# Patient Record
Sex: FEMALE | Race: WHITE | NOT HISPANIC OR LATINO
[De-identification: names, ages, dates, MRNs, and addresses within clinical notes are randomized per-mention and may not be internally consistent; named-entity substitution may affect disease eponyms.]

---

## 2017-02-07 ENCOUNTER — TRANSCRIPTION ENCOUNTER (OUTPATIENT)
Age: 47
End: 2017-02-07

## 2017-06-30 ENCOUNTER — TRANSCRIPTION ENCOUNTER (OUTPATIENT)
Age: 47
End: 2017-06-30

## 2017-09-01 ENCOUNTER — TRANSCRIPTION ENCOUNTER (OUTPATIENT)
Age: 47
End: 2017-09-01

## 2018-01-22 ENCOUNTER — TRANSCRIPTION ENCOUNTER (OUTPATIENT)
Age: 48
End: 2018-01-22

## 2018-02-19 ENCOUNTER — TRANSCRIPTION ENCOUNTER (OUTPATIENT)
Age: 48
End: 2018-02-19

## 2018-03-01 ENCOUNTER — TRANSCRIPTION ENCOUNTER (OUTPATIENT)
Age: 48
End: 2018-03-01

## 2018-07-10 ENCOUNTER — EMERGENCY (EMERGENCY)
Facility: HOSPITAL | Age: 48
LOS: 1 days | Discharge: HOME | End: 2018-07-10
Attending: EMERGENCY MEDICINE
Payer: COMMERCIAL

## 2018-07-10 VITALS
RESPIRATION RATE: 16 BRPM | TEMPERATURE: 99 F | SYSTOLIC BLOOD PRESSURE: 178 MMHG | HEART RATE: 88 BPM | DIASTOLIC BLOOD PRESSURE: 97 MMHG | OXYGEN SATURATION: 97 % | HEIGHT: 63 IN | WEIGHT: 240.08 LBS

## 2018-07-10 VITALS
OXYGEN SATURATION: 99 % | SYSTOLIC BLOOD PRESSURE: 137 MMHG | RESPIRATION RATE: 17 BRPM | HEART RATE: 75 BPM | TEMPERATURE: 98 F | DIASTOLIC BLOOD PRESSURE: 78 MMHG

## 2018-07-10 LAB
ALBUMIN SERPL ELPH-MCNC: 3.2 G/DL — LOW (ref 3.3–5)
ALP SERPL-CCNC: 191 U/L — HIGH (ref 40–120)
ALT FLD-CCNC: 38 U/L — SIGNIFICANT CHANGE UP (ref 12–78)
ANION GAP SERPL CALC-SCNC: 11 MMOL/L — SIGNIFICANT CHANGE UP (ref 5–17)
APPEARANCE UR: ABNORMAL
AST SERPL-CCNC: 43 U/L — HIGH (ref 15–37)
BASOPHILS # BLD AUTO: 0.03 K/UL — SIGNIFICANT CHANGE UP (ref 0–0.2)
BASOPHILS NFR BLD AUTO: 0.4 % — SIGNIFICANT CHANGE UP (ref 0–2)
BILIRUB SERPL-MCNC: 0.5 MG/DL — SIGNIFICANT CHANGE UP (ref 0.2–1.2)
BILIRUB UR-MCNC: ABNORMAL
BUN SERPL-MCNC: 7 MG/DL — SIGNIFICANT CHANGE UP (ref 7–23)
CALCIUM SERPL-MCNC: 8.7 MG/DL — SIGNIFICANT CHANGE UP (ref 8.5–10.1)
CHLORIDE SERPL-SCNC: 101 MMOL/L — SIGNIFICANT CHANGE UP (ref 96–108)
CO2 SERPL-SCNC: 25 MMOL/L — SIGNIFICANT CHANGE UP (ref 22–31)
COLOR SPEC: ABNORMAL
CREAT SERPL-MCNC: 0.69 MG/DL — SIGNIFICANT CHANGE UP (ref 0.5–1.3)
DIFF PNL FLD: ABNORMAL
EOSINOPHIL # BLD AUTO: 0.06 K/UL — SIGNIFICANT CHANGE UP (ref 0–0.5)
EOSINOPHIL NFR BLD AUTO: 0.9 % — SIGNIFICANT CHANGE UP (ref 0–6)
GLUCOSE SERPL-MCNC: 88 MG/DL — SIGNIFICANT CHANGE UP (ref 70–99)
GLUCOSE UR QL: NEGATIVE — SIGNIFICANT CHANGE UP
HCG SERPL-ACNC: <1 MIU/ML — SIGNIFICANT CHANGE UP
HCT VFR BLD CALC: 37.8 % — SIGNIFICANT CHANGE UP (ref 34.5–45)
HGB BLD-MCNC: 12.3 G/DL — SIGNIFICANT CHANGE UP (ref 11.5–15.5)
IMM GRANULOCYTES NFR BLD AUTO: 0.3 % — SIGNIFICANT CHANGE UP (ref 0–1.5)
KETONES UR-MCNC: ABNORMAL
LACTATE SERPL-SCNC: 1.3 MMOL/L — SIGNIFICANT CHANGE UP (ref 0.7–2)
LEUKOCYTE ESTERASE UR-ACNC: ABNORMAL
LYMPHOCYTES # BLD AUTO: 1.19 K/UL — SIGNIFICANT CHANGE UP (ref 1–3.3)
LYMPHOCYTES # BLD AUTO: 17.7 % — SIGNIFICANT CHANGE UP (ref 13–44)
MCHC RBC-ENTMCNC: 26.8 PG — LOW (ref 27–34)
MCHC RBC-ENTMCNC: 32.5 GM/DL — SIGNIFICANT CHANGE UP (ref 32–36)
MCV RBC AUTO: 82.4 FL — SIGNIFICANT CHANGE UP (ref 80–100)
MONOCYTES # BLD AUTO: 0.72 K/UL — SIGNIFICANT CHANGE UP (ref 0–0.9)
MONOCYTES NFR BLD AUTO: 10.7 % — SIGNIFICANT CHANGE UP (ref 2–14)
NEUTROPHILS # BLD AUTO: 4.69 K/UL — SIGNIFICANT CHANGE UP (ref 1.8–7.4)
NEUTROPHILS NFR BLD AUTO: 70 % — SIGNIFICANT CHANGE UP (ref 43–77)
NITRITE UR-MCNC: NEGATIVE — SIGNIFICANT CHANGE UP
PH UR: 5 — SIGNIFICANT CHANGE UP (ref 5–8)
PLATELET # BLD AUTO: 286 K/UL — SIGNIFICANT CHANGE UP (ref 150–400)
POTASSIUM SERPL-MCNC: 3.4 MMOL/L — LOW (ref 3.5–5.3)
POTASSIUM SERPL-SCNC: 3.4 MMOL/L — LOW (ref 3.5–5.3)
PROT SERPL-MCNC: 8 G/DL — SIGNIFICANT CHANGE UP (ref 6–8.3)
PROT UR-MCNC: 25 MG/DL
RAPID RVP RESULT: SIGNIFICANT CHANGE UP
RBC # BLD: 4.59 M/UL — SIGNIFICANT CHANGE UP (ref 3.8–5.2)
RBC # FLD: 12.7 % — SIGNIFICANT CHANGE UP (ref 10.3–14.5)
SODIUM SERPL-SCNC: 137 MMOL/L — SIGNIFICANT CHANGE UP (ref 135–145)
SP GR SPEC: 1.02 — SIGNIFICANT CHANGE UP (ref 1.01–1.02)
UROBILINOGEN FLD QL: 8
WBC # BLD: 6.71 K/UL — SIGNIFICANT CHANGE UP (ref 3.8–10.5)
WBC # FLD AUTO: 6.71 K/UL — SIGNIFICANT CHANGE UP (ref 3.8–10.5)

## 2018-07-10 PROCEDURE — 71045 X-RAY EXAM CHEST 1 VIEW: CPT

## 2018-07-10 PROCEDURE — 99284 EMERGENCY DEPT VISIT MOD MDM: CPT | Mod: 25

## 2018-07-10 PROCEDURE — 80053 COMPREHEN METABOLIC PANEL: CPT

## 2018-07-10 PROCEDURE — 93005 ELECTROCARDIOGRAM TRACING: CPT

## 2018-07-10 PROCEDURE — 86658 ENTEROVIRUS ANTIBODY: CPT

## 2018-07-10 PROCEDURE — 81001 URINALYSIS AUTO W/SCOPE: CPT

## 2018-07-10 PROCEDURE — 87486 CHLMYD PNEUM DNA AMP PROBE: CPT

## 2018-07-10 PROCEDURE — 74177 CT ABD & PELVIS W/CONTRAST: CPT | Mod: 26

## 2018-07-10 PROCEDURE — 87040 BLOOD CULTURE FOR BACTERIA: CPT

## 2018-07-10 PROCEDURE — 83605 ASSAY OF LACTIC ACID: CPT

## 2018-07-10 PROCEDURE — 87798 DETECT AGENT NOS DNA AMP: CPT

## 2018-07-10 PROCEDURE — 84702 CHORIONIC GONADOTROPIN TEST: CPT

## 2018-07-10 PROCEDURE — 87633 RESP VIRUS 12-25 TARGETS: CPT

## 2018-07-10 PROCEDURE — 74177 CT ABD & PELVIS W/CONTRAST: CPT

## 2018-07-10 PROCEDURE — 71045 X-RAY EXAM CHEST 1 VIEW: CPT | Mod: 26

## 2018-07-10 PROCEDURE — 85027 COMPLETE CBC AUTOMATED: CPT

## 2018-07-10 PROCEDURE — 99285 EMERGENCY DEPT VISIT HI MDM: CPT

## 2018-07-10 PROCEDURE — 93010 ELECTROCARDIOGRAM REPORT: CPT

## 2018-07-10 PROCEDURE — 96374 THER/PROPH/DIAG INJ IV PUSH: CPT | Mod: XU

## 2018-07-10 PROCEDURE — 87581 M.PNEUMON DNA AMP PROBE: CPT

## 2018-07-10 PROCEDURE — 87086 URINE CULTURE/COLONY COUNT: CPT

## 2018-07-10 PROCEDURE — 87150 DNA/RNA AMPLIFIED PROBE: CPT

## 2018-07-10 RX ORDER — HYDROXYCHLOROQUINE SULFATE 200 MG
1 TABLET ORAL
Qty: 0 | Refills: 0 | COMMUNITY

## 2018-07-10 RX ORDER — SODIUM CHLORIDE 9 MG/ML
1000 INJECTION INTRAMUSCULAR; INTRAVENOUS; SUBCUTANEOUS ONCE
Qty: 0 | Refills: 0 | Status: COMPLETED | OUTPATIENT
Start: 2018-07-10 | End: 2018-07-10

## 2018-07-10 RX ORDER — CEFUROXIME AXETIL 250 MG
1 TABLET ORAL
Qty: 14 | Refills: 0 | OUTPATIENT
Start: 2018-07-10 | End: 2018-07-16

## 2018-07-10 RX ORDER — CEFTRIAXONE 500 MG/1
1 INJECTION, POWDER, FOR SOLUTION INTRAMUSCULAR; INTRAVENOUS ONCE
Qty: 0 | Refills: 0 | Status: COMPLETED | OUTPATIENT
Start: 2018-07-10 | End: 2018-07-10

## 2018-07-10 RX ADMIN — SODIUM CHLORIDE 1000 MILLILITER(S): 9 INJECTION INTRAMUSCULAR; INTRAVENOUS; SUBCUTANEOUS at 12:43

## 2018-07-10 RX ADMIN — CEFTRIAXONE 100 GRAM(S): 500 INJECTION, POWDER, FOR SOLUTION INTRAMUSCULAR; INTRAVENOUS at 14:57

## 2018-07-10 NOTE — ED ADULT NURSE NOTE - OBJECTIVE STATEMENT
This is a 48 y/o Female received ambulatory c/o fever 101.7 fever started Thursday.  was seen by urgent care Sunday started Keflex, for UTI  pt reports sores in her mouth  rapid strep (-). Patient denies any nausea, vomiting. respiration even unlabored lung field clear bilaterally will monitor support and safety provided.

## 2018-07-10 NOTE — ED ADULT TRIAGE NOTE - CHIEF COMPLAINT QUOTE
reported tmax 101.7 fever started Thursday.  was seen by urgent care Sunday started Keflex, for UTI  pt reports sores in her mouth  rapid strep (-)

## 2018-07-10 NOTE — ED PROVIDER NOTE - PROGRESS NOTE DETAILS
labs imaging explained, explained this could be HSV or coxsackie, will f/u with PMD, told to return if symptoms worsen

## 2018-07-10 NOTE — ED PROVIDER NOTE - OBJECTIVE STATEMENT
46 yo female hx of lupus c/o intermittent fever since thursday, tmax 101.7, initially self treated for UTI symptoms with macrobid, then went to urgent care started on keflex.  Had rapid strep negative there.  Noticed oral lesion/ulcers 2 days ago.  Was exposed to children that had coxsackie.  No urinary complaints now.  PMD Dr. Bliss.

## 2018-07-10 NOTE — ED PROVIDER NOTE - ENMT, MLM
Airway patent, Nasal mucosa clear. +apthous like ulcer on right upper lip and roof of mouth and lower inner lip. Throat has no vesicles, no oropharyngeal exudates and uvula is midline.

## 2018-07-10 NOTE — ED PROVIDER NOTE - CARE PLAN
Principal Discharge DX:	Viral syndrome Principal Discharge DX:	Viral syndrome  Secondary Diagnosis:	UTI (urinary tract infection)

## 2018-07-11 LAB
-  COAGULASE NEGATIVE STAPHYLOCOCCUS: SIGNIFICANT CHANGE UP
CULTURE RESULTS: NO GROWTH — SIGNIFICANT CHANGE UP
GRAM STN FLD: SIGNIFICANT CHANGE UP
METHOD TYPE: SIGNIFICANT CHANGE UP
SPECIMEN SOURCE: SIGNIFICANT CHANGE UP
SPECIMEN SOURCE: SIGNIFICANT CHANGE UP

## 2018-07-12 LAB — GRAM STN FLD: SIGNIFICANT CHANGE UP

## 2018-07-13 LAB
CULTURE RESULTS: SIGNIFICANT CHANGE UP
CV B1 AB TITR FLD: HIGH
CV B2 AB TITR FLD: HIGH
CV B3 AB TITR FLD: HIGH
CV B4 AB TITR FLD: HIGH
CV B5 AB TITR FLD: HIGH
CV B6 AB TITR FLD: HIGH
ORGANISM # SPEC MICROSCOPIC CNT: SIGNIFICANT CHANGE UP
ORGANISM # SPEC MICROSCOPIC CNT: SIGNIFICANT CHANGE UP
SPECIMEN SOURCE: SIGNIFICANT CHANGE UP

## 2018-07-15 LAB
CULTURE RESULTS: SIGNIFICANT CHANGE UP
SPECIMEN SOURCE: SIGNIFICANT CHANGE UP

## 2018-07-17 ENCOUNTER — TRANSCRIPTION ENCOUNTER (OUTPATIENT)
Age: 48
End: 2018-07-17

## 2018-10-09 ENCOUNTER — TRANSCRIPTION ENCOUNTER (OUTPATIENT)
Age: 48
End: 2018-10-09

## 2018-11-10 ENCOUNTER — TRANSCRIPTION ENCOUNTER (OUTPATIENT)
Age: 48
End: 2018-11-10

## 2019-08-09 ENCOUNTER — TRANSCRIPTION ENCOUNTER (OUTPATIENT)
Age: 49
End: 2019-08-09

## 2019-10-28 ENCOUNTER — TRANSCRIPTION ENCOUNTER (OUTPATIENT)
Age: 49
End: 2019-10-28

## 2019-11-15 ENCOUNTER — TRANSCRIPTION ENCOUNTER (OUTPATIENT)
Age: 49
End: 2019-11-15

## 2020-01-29 ENCOUNTER — TRANSCRIPTION ENCOUNTER (OUTPATIENT)
Age: 50
End: 2020-01-29

## 2020-03-04 PROBLEM — L93.0 DISCOID LUPUS ERYTHEMATOSUS: Chronic | Status: ACTIVE | Noted: 2018-07-10

## 2020-03-05 ENCOUNTER — NON-APPOINTMENT (OUTPATIENT)
Age: 50
End: 2020-03-05

## 2020-03-05 ENCOUNTER — APPOINTMENT (OUTPATIENT)
Dept: CARDIOLOGY | Facility: CLINIC | Age: 50
End: 2020-03-05
Payer: COMMERCIAL

## 2020-03-05 VITALS
HEART RATE: 75 BPM | SYSTOLIC BLOOD PRESSURE: 143 MMHG | BODY MASS INDEX: 45.45 KG/M2 | OXYGEN SATURATION: 96 % | HEIGHT: 62 IN | DIASTOLIC BLOOD PRESSURE: 84 MMHG | WEIGHT: 247 LBS

## 2020-03-05 VITALS — SYSTOLIC BLOOD PRESSURE: 128 MMHG | DIASTOLIC BLOOD PRESSURE: 85 MMHG

## 2020-03-05 DIAGNOSIS — M32.9 SYSTEMIC LUPUS ERYTHEMATOSUS, UNSPECIFIED: ICD-10-CM

## 2020-03-05 DIAGNOSIS — Z82.49 FAMILY HISTORY OF ISCHEMIC HEART DISEASE AND OTHER DISEASES OF THE CIRCULATORY SYSTEM: ICD-10-CM

## 2020-03-05 DIAGNOSIS — Z87.42 PERSONAL HISTORY OF OTHER DISEASES OF THE FEMALE GENITAL TRACT: ICD-10-CM

## 2020-03-05 PROCEDURE — 93000 ELECTROCARDIOGRAM COMPLETE: CPT

## 2020-03-05 PROCEDURE — 99244 OFF/OP CNSLTJ NEW/EST MOD 40: CPT

## 2020-03-05 NOTE — HISTORY OF PRESENT ILLNESS
[FreeTextEntry1] : Marcelle is a 49-year-old here for evaluation. She has a history of lupus diagnosed about 4 years ago. She reports a possible history of pericarditis in the past.\par Recently, her ESR has been more elevated in the 80 range. She has been on Plaquenil for years. There is a plan to possibly start her on immunotherapy.\par \par She reports an episode of chest pain, at night, associated with some arm pain. She also reports dyspnea on exertion, and generally not feeling well. She has a very strong family history of coronary artery disease. All of the males in her family had CAD. Her father had bypass surgery in his 20s, and passed away before 45.\par She denies toxic habits. She reports weight gain overall and has not been exercising.

## 2020-03-05 NOTE — DISCUSSION/SUMMARY
[FreeTextEntry1] : Marcelle is a 49-year-old female here for evaluation. She has a history of lupus, and a significant family history of premature coronary artery disease. Her EKG today is unremarkable. In the setting of her symptoms, she will have a 2-D echocardiogram to evaluate for structural heart disease and/or a pericardial effusion. She will also have an exercise stress test to evaluate for ischemia. Depending on results of this test, we will proceed with additional testing for risk stratification, given her strong family history of premature disease. I stressed importance of diet, exercise, weight loss, to reduce her overall cardiovascular risk. We will speak after the tests and arrange followup.

## 2020-03-05 NOTE — REVIEW OF SYSTEMS
[Feeling Fatigued] : feeling fatigued [see HPI] : see HPI [Shortness Of Breath] : shortness of breath [Dyspnea on exertion] : dyspnea during exertion [Chest Pain] : chest pain [Skin: A Rash] : rash: [Negative] : Heme/Lymph

## 2020-03-05 NOTE — PHYSICAL EXAM
[General Appearance - Well Developed] : well developed [Normal Appearance] : normal appearance [Well Groomed] : well groomed [General Appearance - Well Nourished] : well nourished [No Deformities] : no deformities [General Appearance - In No Acute Distress] : no acute distress [Normal Conjunctiva] : the conjunctiva exhibited no abnormalities [Eyelids - No Xanthelasma] : the eyelids demonstrated no xanthelasmas [Normal Oral Mucosa] : normal oral mucosa [No Oral Pallor] : no oral pallor [No Oral Cyanosis] : no oral cyanosis [Normal Jugular Venous A Waves Present] : normal jugular venous A waves present [Normal Jugular Venous V Waves Present] : normal jugular venous V waves present [No Jugular Venous Ball A Waves] : no jugular venous ball A waves [Normal Rate] : normal [Normal S1] : normal S1 [Normal S2] : normal S2 [S3] : no S3 [S4] : no S4 [No Murmur] : no murmurs heard [Right Carotid Bruit] : no bruit heard over the right carotid [Left Carotid Bruit] : no bruit heard over the left carotid [Right Femoral Bruit] : no bruit heard over the right femoral artery [Left Femoral Bruit] : no bruit heard over the left femoral artery [2+] : left 2+ [No Abnormalities] : the abdominal aorta was not enlarged and no bruit was heard [No Pitting Edema] : no pitting edema present [Respiration, Rhythm And Depth] : normal respiratory rhythm and effort [Exaggerated Use Of Accessory Muscles For Inspiration] : no accessory muscle use [Auscultation Breath Sounds / Voice Sounds] : lungs were clear to auscultation bilaterally [Abdomen Soft] : soft [Abdomen Tenderness] : non-tender [Abdomen Mass (___ Cm)] : no abdominal mass palpated [Abnormal Walk] : normal gait [Gait - Sufficient For Exercise Testing] : the gait was sufficient for exercise testing [Nail Clubbing] : no clubbing of the fingernails [Cyanosis, Localized] : no localized cyanosis [Petechial Hemorrhages (___cm)] : no petechial hemorrhages [Skin Color & Pigmentation] : normal skin color and pigmentation [] : no rash [No Venous Stasis] : no venous stasis [Skin Lesions] : no skin lesions [No Skin Ulcers] : no skin ulcer [No Xanthoma] : no  xanthoma was observed [Oriented To Time, Place, And Person] : oriented to person, place, and time [Affect] : the affect was normal [Mood] : the mood was normal [No Anxiety] : not feeling anxious

## 2020-03-12 ENCOUNTER — APPOINTMENT (OUTPATIENT)
Dept: PULMONOLOGY | Facility: CLINIC | Age: 50
End: 2020-03-12
Payer: COMMERCIAL

## 2020-03-12 PROCEDURE — 94729 DIFFUSING CAPACITY: CPT

## 2020-03-12 PROCEDURE — 94726 PLETHYSMOGRAPHY LUNG VOLUMES: CPT

## 2020-03-12 PROCEDURE — 94010 BREATHING CAPACITY TEST: CPT

## 2020-03-17 ENCOUNTER — APPOINTMENT (OUTPATIENT)
Dept: CARDIOLOGY | Facility: CLINIC | Age: 50
End: 2020-03-17
Payer: COMMERCIAL

## 2020-03-17 PROCEDURE — 93306 TTE W/DOPPLER COMPLETE: CPT

## 2020-03-17 PROCEDURE — 93015 CV STRESS TEST SUPVJ I&R: CPT

## 2020-05-13 ENCOUNTER — TRANSCRIPTION ENCOUNTER (OUTPATIENT)
Age: 50
End: 2020-05-13

## 2020-06-17 ENCOUNTER — LABORATORY RESULT (OUTPATIENT)
Age: 50
End: 2020-06-17

## 2020-06-18 LAB
ALBUMIN SERPL ELPH-MCNC: 4.2 G/DL
ALP BLD-CCNC: 133 U/L
ALT SERPL-CCNC: 19 U/L
ANION GAP SERPL CALC-SCNC: 14 MMOL/L
APPEARANCE: CLEAR
AST SERPL-CCNC: 22 U/L
BASOPHILS # BLD AUTO: 0.08 K/UL
BASOPHILS NFR BLD AUTO: 1.1 %
BILIRUB SERPL-MCNC: 0.2 MG/DL
BILIRUBIN URINE: NEGATIVE
BLOOD URINE: NEGATIVE
BUN SERPL-MCNC: 12 MG/DL
C3 SERPL-MCNC: 170 MG/DL
C4 SERPL-MCNC: 24 MG/DL
CALCIUM SERPL-MCNC: 9 MG/DL
CHLORIDE SERPL-SCNC: 100 MMOL/L
CO2 SERPL-SCNC: 25 MMOL/L
COLOR: COLORLESS
CREAT SERPL-MCNC: 0.68 MG/DL
DSDNA AB SER-ACNC: <12 IU/ML
EOSINOPHIL # BLD AUTO: 0.45 K/UL
EOSINOPHIL NFR BLD AUTO: 6.3 %
ERYTHROCYTE [SEDIMENTATION RATE] IN BLOOD BY WESTERGREN METHOD: 62 MM/HR
GLUCOSE QUALITATIVE U: NEGATIVE
GLUCOSE SERPL-MCNC: 69 MG/DL
HCT VFR BLD CALC: 38.8 %
HGB BLD-MCNC: 11.5 G/DL
IMM GRANULOCYTES NFR BLD AUTO: 0.3 %
KETONES URINE: NEGATIVE
LEUKOCYTE ESTERASE URINE: NEGATIVE
LYMPHOCYTES # BLD AUTO: 2.36 K/UL
LYMPHOCYTES NFR BLD AUTO: 32.8 %
MAN DIFF?: NORMAL
MCHC RBC-ENTMCNC: 26.6 PG
MCHC RBC-ENTMCNC: 29.6 GM/DL
MCV RBC AUTO: 89.6 FL
MONOCYTES # BLD AUTO: 0.64 K/UL
MONOCYTES NFR BLD AUTO: 8.9 %
NEUTROPHILS # BLD AUTO: 3.64 K/UL
NEUTROPHILS NFR BLD AUTO: 50.6 %
NITRITE URINE: NEGATIVE
PH URINE: 6.5
PLATELET # BLD AUTO: 360 K/UL
POTASSIUM SERPL-SCNC: 4.4 MMOL/L
PROT SERPL-MCNC: 7.1 G/DL
PROTEIN URINE: NEGATIVE
RBC # BLD: 4.33 M/UL
RBC # FLD: 13.3 %
SODIUM SERPL-SCNC: 139 MMOL/L
SPECIFIC GRAVITY URINE: 1.01
UROBILINOGEN URINE: NORMAL
WBC # FLD AUTO: 7.19 K/UL

## 2020-07-02 DIAGNOSIS — Z82.49 FAMILY HISTORY OF ISCHEMIC HEART DISEASE AND OTHER DISEASES OF THE CIRCULATORY SYSTEM: ICD-10-CM

## 2020-07-14 ENCOUNTER — LABORATORY RESULT (OUTPATIENT)
Age: 50
End: 2020-07-14

## 2020-07-17 LAB
ALBUMIN SERPL ELPH-MCNC: 4.2 G/DL
ALP BLD-CCNC: 138 U/L
ALT SERPL-CCNC: 22 U/L
ANION GAP SERPL CALC-SCNC: 15 MMOL/L
APPEARANCE: CLEAR
AST SERPL-CCNC: 26 U/L
BASOPHILS # BLD AUTO: 0.09 K/UL
BASOPHILS NFR BLD AUTO: 1.3 %
BILIRUB SERPL-MCNC: 0.2 MG/DL
BILIRUBIN URINE: NEGATIVE
BLOOD URINE: NEGATIVE
BUN SERPL-MCNC: 15 MG/DL
C3 SERPL-MCNC: 149 MG/DL
C4 SERPL-MCNC: 26 MG/DL
CALCIUM SERPL-MCNC: 9 MG/DL
CHLORIDE SERPL-SCNC: 101 MMOL/L
CO2 SERPL-SCNC: 22 MMOL/L
COLOR: COLORLESS
CREAT SERPL-MCNC: 0.73 MG/DL
DSDNA AB SER-ACNC: <12 IU/ML
EOSINOPHIL # BLD AUTO: 0.31 K/UL
EOSINOPHIL NFR BLD AUTO: 4.6 %
ERYTHROCYTE [SEDIMENTATION RATE] IN BLOOD BY WESTERGREN METHOD: 97 MM/HR
GLUCOSE QUALITATIVE U: NEGATIVE
GLUCOSE SERPL-MCNC: 92 MG/DL
HCT VFR BLD CALC: 37.2 %
HGB BLD-MCNC: 11.5 G/DL
IMM GRANULOCYTES NFR BLD AUTO: 0.3 %
KETONES URINE: NEGATIVE
LEUKOCYTE ESTERASE URINE: NEGATIVE
LYMPHOCYTES # BLD AUTO: 2.09 K/UL
LYMPHOCYTES NFR BLD AUTO: 31 %
MAN DIFF?: NORMAL
MCHC RBC-ENTMCNC: 26.9 PG
MCHC RBC-ENTMCNC: 30.9 GM/DL
MCV RBC AUTO: 87.1 FL
MONOCYTES # BLD AUTO: 0.56 K/UL
MONOCYTES NFR BLD AUTO: 8.3 %
NEUTROPHILS # BLD AUTO: 3.68 K/UL
NEUTROPHILS NFR BLD AUTO: 54.5 %
NITRITE URINE: NEGATIVE
PH URINE: 6.5
PLATELET # BLD AUTO: 322 K/UL
POTASSIUM SERPL-SCNC: 4.1 MMOL/L
PROT SERPL-MCNC: 6.8 G/DL
PROTEIN URINE: NEGATIVE
RBC # BLD: 4.27 M/UL
RBC # FLD: 13.4 %
SODIUM SERPL-SCNC: 138 MMOL/L
SPECIFIC GRAVITY URINE: 1.01
UROBILINOGEN URINE: NORMAL
WBC # FLD AUTO: 6.75 K/UL

## 2020-09-21 ENCOUNTER — TRANSCRIPTION ENCOUNTER (OUTPATIENT)
Age: 50
End: 2020-09-21

## 2020-10-02 NOTE — ED PROVIDER NOTE - RESPIRATORY, MLM
Prophylactic measure
Breath sounds clear and equal bilaterally.

## 2021-02-04 ENCOUNTER — NON-APPOINTMENT (OUTPATIENT)
Age: 51
End: 2021-02-04

## 2021-02-08 ENCOUNTER — APPOINTMENT (OUTPATIENT)
Dept: RHEUMATOLOGY | Facility: CLINIC | Age: 51
End: 2021-02-08

## 2021-02-08 VITALS
WEIGHT: 248 LBS | RESPIRATION RATE: 16 BRPM | BODY MASS INDEX: 45.36 KG/M2 | TEMPERATURE: 97.7 F | SYSTOLIC BLOOD PRESSURE: 136 MMHG | OXYGEN SATURATION: 98 % | HEART RATE: 89 BPM | DIASTOLIC BLOOD PRESSURE: 80 MMHG

## 2021-02-08 RX ORDER — NITROFURANTOIN (MONOHYDRATE/MACROCRYSTALS) 25; 75 MG/1; MG/1
100 CAPSULE ORAL
Qty: 14 | Refills: 0 | Status: DISCONTINUED | COMMUNITY
Start: 2021-01-22

## 2021-02-08 RX ORDER — CIPROFLOXACIN HYDROCHLORIDE 250 MG/1
250 TABLET, FILM COATED ORAL
Qty: 6 | Refills: 0 | Status: DISCONTINUED | COMMUNITY
Start: 2020-09-21

## 2021-02-08 RX ORDER — AMOXICILLIN 500 MG/1
500 CAPSULE ORAL
Qty: 21 | Refills: 0 | Status: DISCONTINUED | COMMUNITY
Start: 2021-02-03

## 2021-02-08 RX ORDER — MYCOPHENOLATE MOFETIL 500 MG/1
TABLET, FILM COATED ORAL
Refills: 0 | Status: DISCONTINUED | COMMUNITY
End: 2021-02-01

## 2021-02-08 RX ORDER — COLCHICINE 0.6 MG/1
0.6 TABLET ORAL TWICE DAILY
Qty: 60 | Refills: 2 | Status: DISCONTINUED | COMMUNITY
Start: 1900-01-01 | End: 2021-02-08

## 2021-02-08 RX ORDER — CHLORHEXIDINE GLUCONATE, 0.12% ORAL RINSE 1.2 MG/ML
0.12 SOLUTION DENTAL
Qty: 473 | Refills: 0 | Status: DISCONTINUED | COMMUNITY
Start: 2021-02-03

## 2021-02-08 NOTE — HISTORY OF PRESENT ILLNESS
[FreeTextEntry1] : 50 year old female with SLE diagnosed 2016 with features that include malar rash, oral ulcers, arthritis, pericarditis, DNA+, alopecia, fever.  Of note, she is ROSE-.  Her serologies include - Sm/RNP/Ro/La/aPL; she has never been hypocomplementemic.  Her ESR has been persistently elevated. She has had a skin biopsy with interface dermatitis; her most pressing complaints have been of joint pain and stiffness, malar rash, fatigue.\par \par Febrary 8, 2021\par f/u SLE, on Methotrexate 12.5mg x4 weeks.  She continues on plaquenil and colchicine; MMF was tapered over 3 weeks since her last visit on January 12.  She notes marked improvement and has no pain.  Her previous stiffness has lessoned to ~1 hour (and there have been recent days when she had no stiffness).  She additionally feels markedly more energetic.  There is increased alopecia (?medication related), no rash, nausea, vomiting, abdominal pain, edema, ulcers, lymphadenopathy, fever, anorexia, weight loss, chest pain, shortness of breath, nausea, vomiting, edema, anosmia or ageusia.  She had a UTI the week of Jan 25--received antibiotics from pcp; also with tooth extraction 2/3 (and given amox for 5 days). She notes dryness of her skin (particularly of her hands)\par \par Meds: Plaquenil 400mg (last optho ~1yr ago), colchicine .6mg bid, methotrexate, 12.5mg q week (Sat), folic acid 1mg/week\par \par PE:  135/80  248lb, 7oz\par Skin: clear without rash; hair thin (but unchanged), no synovitis or joint tenderness. No oral/nasal ulcerations, lymphadenopathy, periungal erythema, edema.\par Lungs: Clear, Cor: RRR w/o m/r/g, Abd: soft, NT, BS+, Ext: no CCE, Neuro-gait normal\par \par Imp\par SLE with improvement of her musculoskeletal symptoms with initiation of methotrexate.  Increased hair loss likely related to the medication.  As she is not sure if her symptoms of stiffness have plateaued or are continuing to improve and will monitor over the next week (if plateau, will increase mtx).  Will check labs.  Will give lachydrin.\par \par COVID-Pt to continue to follow recommendations of social distancing, masks, and hand hygiene.  Have given her a letter stating that she is immunocompromised and should receive the vaccine (when available).\par

## 2021-02-08 NOTE — PHYSICAL EXAM
[General Appearance - Alert] : alert [General Appearance - Well Nourished] : well nourished [General Appearance - Well Developed] : well developed [Extraocular Movements] : extraocular movements were intact [Outer Ear] : the ears and nose were normal in appearance [Examination Of The Oral Cavity] : the lips and gums were normal [Nasal Cavity] : the nasal mucosa and septum were normal [Oropharynx] : the oropharynx was normal [Exaggerated Use Of Accessory Muscles For Inspiration] : no accessory muscle use [Auscultation Breath Sounds / Voice Sounds] : lungs were clear to auscultation bilaterally [Heart Sounds] : normal S1 and S2 [Heart Rate And Rhythm] : heart rate was normal and rhythm regular [Heart Sounds Gallop] : no gallops [Murmurs] : no murmurs [Heart Sounds Pericardial Friction Rub] : no pericardial rub [Bowel Sounds] : normal bowel sounds [Abdomen Soft] : soft [Abdomen Tenderness] : non-tender [Cervical Lymph Nodes Enlarged Anterior Bilaterally] : anterior cervical [Cervical Lymph Nodes Enlarged Posterior Bilaterally] : posterior cervical [Supraclavicular Lymph Nodes Enlarged Bilaterally] : supraclavicular [Abnormal Walk] : normal gait [Nail Clubbing] : no clubbing  or cyanosis of the fingernails [Musculoskeletal - Swelling] : no joint swelling seen [Motor Tone] : muscle strength and tone were normal [Skin Color & Pigmentation] : normal skin color and pigmentation [] : no rash [Skin Lesions] : no skin lesions [FreeTextEntry1] : see hpi [No Focal Deficits] : no focal deficits

## 2021-03-22 ENCOUNTER — APPOINTMENT (OUTPATIENT)
Dept: RHEUMATOLOGY | Facility: CLINIC | Age: 51
End: 2021-03-22

## 2021-03-22 VITALS
DIASTOLIC BLOOD PRESSURE: 81 MMHG | OXYGEN SATURATION: 97 % | RESPIRATION RATE: 16 BRPM | HEART RATE: 91 BPM | TEMPERATURE: 97.1 F | WEIGHT: 248.8 LBS | SYSTOLIC BLOOD PRESSURE: 131 MMHG | BODY MASS INDEX: 45.51 KG/M2

## 2021-03-22 NOTE — PHYSICAL EXAM
[General Appearance - Alert] : alert [General Appearance - Well Nourished] : well nourished [General Appearance - Well Developed] : well developed [Extraocular Movements] : extraocular movements were intact [Outer Ear] : the ears and nose were normal in appearance [Examination Of The Oral Cavity] : the lips and gums were normal [Nasal Cavity] : the nasal mucosa and septum were normal [Oropharynx] : the oropharynx was normal [Exaggerated Use Of Accessory Muscles For Inspiration] : no accessory muscle use [Auscultation Breath Sounds / Voice Sounds] : lungs were clear to auscultation bilaterally [Heart Rate And Rhythm] : heart rate was normal and rhythm regular [Heart Sounds] : normal S1 and S2 [Heart Sounds Gallop] : no gallops [Murmurs] : no murmurs [Heart Sounds Pericardial Friction Rub] : no pericardial rub [Bowel Sounds] : normal bowel sounds [Abdomen Soft] : soft [Abdomen Tenderness] : non-tender [Cervical Lymph Nodes Enlarged Posterior Bilaterally] : posterior cervical [Cervical Lymph Nodes Enlarged Anterior Bilaterally] : anterior cervical [Supraclavicular Lymph Nodes Enlarged Bilaterally] : supraclavicular [Abnormal Walk] : normal gait [Nail Clubbing] : no clubbing  or cyanosis of the fingernails [Motor Tone] : muscle strength and tone were normal [Skin Color & Pigmentation] : normal skin color and pigmentation [] : no rash [Skin Lesions] : no skin lesions [FreeTextEntry1] : see hpi [No Focal Deficits] : no focal deficits

## 2021-03-22 NOTE — HISTORY OF PRESENT ILLNESS
[FreeTextEntry1] : 50 year old female with SLE diagnosed 2016 with features that include malar rash, oral ulcers, arthritis, pericarditis, DNA+, alopecia, fever.  Of note, she is ROSE-.  Her serologies include - Sm/RNP/Ro/La/aPL; she has never been hypocomplementemic.  Her ESR has been persistently elevated. She has had a skin biopsy with interface dermatitis; her most pressing complaints have been of joint pain and stiffness, malar rash, fatigue.\par \par Febrary 8, 2021\par f/u SLE, on Methotrexate 12.5mg x4 weeks.  She continues on plaquenil and colchicine; MMF was tapered over 3 weeks since her last visit on January 12.  She notes marked improvement and has no pain.  Her previous stiffness has lessoned to ~1 hour (and there have been recent days when she had no stiffness).  She additionally feels markedly more energetic.  There is increased alopecia (?medication related), no rash, nausea, vomiting, abdominal pain, edema, ulcers, lymphadenopathy, fever, anorexia, weight loss, chest pain, shortness of breath, nausea, vomiting, edema, anosmia or ageusia.  She had a UTI the week of Jan 25--received antibiotics from pcp; also with tooth extraction 2/3 (and given amox for 5 days). She notes dryness of her skin (particularly of her hands).\par \par Mar 22, 2021\par f/u SLE; MTX held secondary to covid vaccines (last on 3/14--[employer]) for 1 week.  She notes increased stiffness and pain in her hands and left ankle (now with difficulty on treadmill).   Increased hair loss (likely related to the mtx) without change.  No fever, rash, anorexia, weight loss, lymphadenopathy, chills, oral ulcers, fatigue, dry eyes, chest pain, shortness of breath, cough, anosmia, ageusia, nausea, vomiting, diarrhea, abdominal pain. She had noted that her joint symptoms had not continued to improve and decision was made to increase methotrexate (however, not yet done).  ?lachydrin\par \par Meds: Plaquenil 400mg (last optho ~1yr ago), colchicine .6mg bid, methotrexate, 12.5mg q week (Sat), folic acid 1mg/week\par \par PE:  [id number]  248lb, 12.8oz\par Skin: clear without rash; hair thin (but unchanged), left ankle synovitis, tenderness of bilateral mcps and pips without swelling;. No oral/nasal ulcerations, lymphadenopathy, periungal erythema, edema.  Lungs: Clear, Cor: RRR w/o m/r/g, Abd: soft, NT, BS+, Ext: no CCE, Neuro-gait normal\par \par Imp\par SLE with increased musculoskeletal symptoms associated with holding of methotrexate.  To reinitiate medication today at higher dose (15mg) and will move next week by 1 day to weekend.  Will check labs.  i Will check labs.  \par \par COVID-Pt is s/p vaccine (will need post vaccine blood in May); she will continue to follow recommendations of social distancing, masks, and hand hygiene.  \par \par f/u 4 weeks\par

## 2021-04-05 LAB
ALBUMIN SERPL ELPH-MCNC: 4.2 G/DL
ALP BLD-CCNC: 164 U/L
ALT SERPL-CCNC: 19 U/L
ANION GAP SERPL CALC-SCNC: 17 MMOL/L
APPEARANCE: CLEAR
AST SERPL-CCNC: 23 U/L
BASOPHILS # BLD AUTO: 0.05 K/UL
BASOPHILS NFR BLD AUTO: 0.8 %
BILIRUB SERPL-MCNC: <0.2 MG/DL
BILIRUBIN URINE: NEGATIVE
BLOOD URINE: NEGATIVE
BUN SERPL-MCNC: 14 MG/DL
C3 SERPL-MCNC: 167 MG/DL
C4 SERPL-MCNC: 27 MG/DL
CALCIUM SERPL-MCNC: 9.2 MG/DL
CHLORIDE SERPL-SCNC: 100 MMOL/L
CO2 SERPL-SCNC: 23 MMOL/L
COLOR: NORMAL
CREAT SERPL-MCNC: 0.84 MG/DL
DSDNA AB SER-ACNC: <12 IU/ML
EOSINOPHIL # BLD AUTO: 0.3 K/UL
EOSINOPHIL NFR BLD AUTO: 4.5 %
ERYTHROCYTE [SEDIMENTATION RATE] IN BLOOD BY WESTERGREN METHOD: 60 MM/HR
GLUCOSE QUALITATIVE U: NEGATIVE
GLUCOSE SERPL-MCNC: 55 MG/DL
HCT VFR BLD CALC: 38.9 %
HGB BLD-MCNC: 11.9 G/DL
IMM GRANULOCYTES NFR BLD AUTO: 0.3 %
KETONES URINE: NEGATIVE
LEUKOCYTE ESTERASE URINE: NEGATIVE
LYMPHOCYTES # BLD AUTO: 2.26 K/UL
LYMPHOCYTES NFR BLD AUTO: 34.1 %
MAN DIFF?: NORMAL
MCHC RBC-ENTMCNC: 27.6 PG
MCHC RBC-ENTMCNC: 30.6 GM/DL
MCV RBC AUTO: 90.3 FL
MONOCYTES # BLD AUTO: 0.44 K/UL
MONOCYTES NFR BLD AUTO: 6.6 %
NEUTROPHILS # BLD AUTO: 3.55 K/UL
NEUTROPHILS NFR BLD AUTO: 53.7 %
NITRITE URINE: NEGATIVE
PH URINE: 5.5
PLATELET # BLD AUTO: 388 K/UL
POTASSIUM SERPL-SCNC: 4.2 MMOL/L
PROT SERPL-MCNC: 7.2 G/DL
PROTEIN URINE: NEGATIVE
RBC # BLD: 4.31 M/UL
RBC # FLD: 14.7 %
SODIUM SERPL-SCNC: 140 MMOL/L
SPECIFIC GRAVITY URINE: 1.01
UROBILINOGEN URINE: NORMAL
WBC # FLD AUTO: 6.62 K/UL

## 2021-04-19 ENCOUNTER — APPOINTMENT (OUTPATIENT)
Dept: RHEUMATOLOGY | Facility: CLINIC | Age: 51
End: 2021-04-19

## 2021-04-19 VITALS
DIASTOLIC BLOOD PRESSURE: 79 MMHG | HEART RATE: 85 BPM | BODY MASS INDEX: 44.85 KG/M2 | WEIGHT: 245.2 LBS | TEMPERATURE: 97.1 F | RESPIRATION RATE: 18 BRPM | OXYGEN SATURATION: 98 % | SYSTOLIC BLOOD PRESSURE: 116 MMHG

## 2021-04-19 NOTE — HISTORY OF PRESENT ILLNESS
[FreeTextEntry1] : 50 year old female with SLE diagnosed 2016 with features that include malar rash, oral ulcers, arthritis, pericarditis, DNA+, alopecia, fever.  Of note, she is ROSE-.  Her serologies include - Sm/RNP/Ro/La/aPL; she has never been hypocomplementemic.  Her ESR has been persistently elevated. She has had a skin biopsy with interface dermatitis; her most pressing complaints have been of joint pain and stiffness, malar rash, fatigue.\par \par Febrary 8, 2021  PtGA 0.4\par f/u SLE, on Methotrexate 12.5mg x4 weeks.  She continues on plaquenil and colchicine; MMF was tapered over 3 weeks since her last visit on January 12.  She notes marked improvement and has no pain.  Her previous stiffness has lessoned to ~1 hour (and there have been recent days when she had no stiffness).  She additionally feels markedly more energetic.  There is increased alopecia (?medication related), no rash, nausea, vomiting, abdominal pain, edema, ulcers, lymphadenopathy, fever, anorexia, weight loss, chest pain, shortness of breath, nausea, vomiting, edema, anosmia or ageusia.  She had a UTI the week of Jan 25--received antibiotics from pcp; also with tooth extraction 2/3 (and given amox for 5 days). She notes dryness of her skin (particularly of her hands)\par \par Mar 22, 2021  PtGA 1.0\par f/u SLE; MTX held secondary to covid vaccines (last on 3/14--[employer]) for 1 week. She notes increased stiffness and pain in her hands and left ankle (now with difficulty on treadmill). Increased hair loss (likely related to the mtx) without change. No fever, rash, anorexia, weight loss, lymphadenopathy, chills, oral ulcers, fatigue, dry eyes, chest pain, shortness of breath, cough, anosmia, ageusia, nausea, vomiting, diarrhea, abdominal pain. She had noted that her joint symptoms had not continued to improve and decision was made to increase methotrexate (however, not yet done). ?lachydrin\par \par April 19, 2019\par f/u SLE; continues to feel stiff over this past month with occasional swelling of left ankle.  However, feels that she may be slightly improved compared to last visit.  Methotrexate increased last visit from 12.5 to 15mg/week.  Has had nausea following methotrexate on one of her recent doses.  Notes that her dip's have developed swelling and cysts (which was exacerbated post-vaccine).  Of note,she has a family history of hand oa in females.  States that hair continues to be thin.  No rash, alopecia, fever, anorexia, weight loss, lymphadenopathy, fatigue, dry eyes, chest pain, shortness of breath, cough, anosmia, ageusia, nausea, vomiting, diarrhea, abdominal pain.  She will be visiting her sons in [place] for a college graduation, both sons have received the first dose of the Moderna vaccine.\par \par Meds: Plaquenil 400mg (last optho ~1yr ago), colchicine .6mg bid, methotrexate, 12.5mg q week (Sat), folic acid 1mg/week\par \par PE: 116/79  245lb, 3.2oz\par Skin: clear without rash; hair thin (but unchanged), without swelling of L ankle; mild tenderness of right 2-4 mcps, bony enlargement and cysts of DIPs bilaterally; No oral/nasal ulcerations, lymphadenopathy, periungal erythema, edema. Lungs: Clear, Cor: RRR w/o m/r/g, Abd: soft, NT, BS+, Ext: no CCE, Neuro-gait normal\par \par Imp\par SLE with mild improvement in musculoskeletal symptoms over the past month (the MS flare was associated with holding of methotrexate post vaccine doses). Will increase mtx today at higher dose (17.5mg). Will check labs\par \par COVID- s/p vaccine; she will  continue to follow recommendations of social distancing, masks, and hand hygiene.  \par

## 2021-04-19 NOTE — PHYSICAL EXAM
[General Appearance - Alert] : alert [General Appearance - Well Nourished] : well nourished [General Appearance - Well Developed] : well developed [Extraocular Movements] : extraocular movements were intact [Outer Ear] : the ears and nose were normal in appearance [Examination Of The Oral Cavity] : the lips and gums were normal [Nasal Cavity] : the nasal mucosa and septum were normal [Oropharynx] : the oropharynx was normal [Exaggerated Use Of Accessory Muscles For Inspiration] : no accessory muscle use [Auscultation Breath Sounds / Voice Sounds] : lungs were clear to auscultation bilaterally [Heart Sounds] : normal S1 and S2 [Heart Rate And Rhythm] : heart rate was normal and rhythm regular [Heart Sounds Gallop] : no gallops [Murmurs] : no murmurs [Heart Sounds Pericardial Friction Rub] : no pericardial rub [Bowel Sounds] : normal bowel sounds [Abdomen Tenderness] : non-tender [Abdomen Soft] : soft [Cervical Lymph Nodes Enlarged Posterior Bilaterally] : posterior cervical [Cervical Lymph Nodes Enlarged Anterior Bilaterally] : anterior cervical [Supraclavicular Lymph Nodes Enlarged Bilaterally] : supraclavicular [Abnormal Walk] : normal gait [Nail Clubbing] : no clubbing  or cyanosis of the fingernails [Musculoskeletal - Swelling] : no joint swelling seen [Motor Tone] : muscle strength and tone were normal [Skin Color & Pigmentation] : normal skin color and pigmentation [] : no rash [Skin Lesions] : no skin lesions [No Focal Deficits] : no focal deficits [FreeTextEntry1] : see hpi

## 2021-04-26 LAB
ALBUMIN SERPL ELPH-MCNC: 4.3 G/DL
ALP BLD-CCNC: 166 U/L
ALT SERPL-CCNC: 19 U/L
ANION GAP SERPL CALC-SCNC: 17 MMOL/L
APPEARANCE: CLEAR
AST SERPL-CCNC: 26 U/L
BASOPHILS # BLD AUTO: 0.05 K/UL
BASOPHILS NFR BLD AUTO: 0.8 %
BILIRUB SERPL-MCNC: 0.2 MG/DL
BILIRUBIN URINE: NEGATIVE
BLOOD URINE: NEGATIVE
BUN SERPL-MCNC: 12 MG/DL
C3 SERPL-MCNC: 169 MG/DL
C4 SERPL-MCNC: 26 MG/DL
CALCIUM SERPL-MCNC: 9.3 MG/DL
CHLORIDE SERPL-SCNC: 100 MMOL/L
CO2 SERPL-SCNC: 23 MMOL/L
COLOR: COLORLESS
CREAT SERPL-MCNC: 0.82 MG/DL
DSDNA AB SER-ACNC: <12 IU/ML
EOSINOPHIL # BLD AUTO: 0.3 K/UL
EOSINOPHIL NFR BLD AUTO: 4.7 %
ERYTHROCYTE [SEDIMENTATION RATE] IN BLOOD BY WESTERGREN METHOD: 43 MM/HR
GLUCOSE QUALITATIVE U: NEGATIVE
GLUCOSE SERPL-MCNC: 49 MG/DL
HCT VFR BLD CALC: 39.9 %
HGB BLD-MCNC: 12.2 G/DL
IMM GRANULOCYTES NFR BLD AUTO: 0.3 %
KETONES URINE: NEGATIVE
LEUKOCYTE ESTERASE URINE: NEGATIVE
LYMPHOCYTES # BLD AUTO: 2.27 K/UL
LYMPHOCYTES NFR BLD AUTO: 35.5 %
MAN DIFF?: NORMAL
MCHC RBC-ENTMCNC: 28.5 PG
MCHC RBC-ENTMCNC: 30.6 GM/DL
MCV RBC AUTO: 93.2 FL
MONOCYTES # BLD AUTO: 0.45 K/UL
MONOCYTES NFR BLD AUTO: 7 %
NEUTROPHILS # BLD AUTO: 3.31 K/UL
NEUTROPHILS NFR BLD AUTO: 51.7 %
NITRITE URINE: NEGATIVE
PH URINE: 6
PLATELET # BLD AUTO: 367 K/UL
POTASSIUM SERPL-SCNC: 4.6 MMOL/L
PROT SERPL-MCNC: 7 G/DL
PROTEIN URINE: NEGATIVE
RBC # BLD: 4.28 M/UL
RBC # FLD: 14.6 %
SODIUM SERPL-SCNC: 140 MMOL/L
SPECIFIC GRAVITY URINE: 1
UROBILINOGEN URINE: NORMAL
WBC # FLD AUTO: 6.4 K/UL

## 2021-05-17 ENCOUNTER — APPOINTMENT (OUTPATIENT)
Dept: RHEUMATOLOGY | Facility: CLINIC | Age: 51
End: 2021-05-17

## 2021-05-17 VITALS
OXYGEN SATURATION: 98 % | WEIGHT: 245.8 LBS | DIASTOLIC BLOOD PRESSURE: 82 MMHG | TEMPERATURE: 97.3 F | RESPIRATION RATE: 18 BRPM | HEART RATE: 81 BPM | BODY MASS INDEX: 44.96 KG/M2 | SYSTOLIC BLOOD PRESSURE: 135 MMHG

## 2021-05-17 RX ORDER — HYDROCORTISONE VALERATE 2 MG/G
0.2 CREAM TOPICAL TWICE DAILY
Qty: 1 | Refills: 1 | Status: ACTIVE | COMMUNITY
Start: 2021-05-17 | End: 1900-01-01

## 2021-05-17 NOTE — PHYSICAL EXAM
[General Appearance - Alert] : alert [General Appearance - Well Nourished] : well nourished [General Appearance - Well Developed] : well developed [Extraocular Movements] : extraocular movements were intact [Outer Ear] : the ears and nose were normal in appearance [Examination Of The Oral Cavity] : the lips and gums were normal [Nasal Cavity] : the nasal mucosa and septum were normal [Neck Appearance] : the appearance of the neck was normal [] : no respiratory distress [Exaggerated Use Of Accessory Muscles For Inspiration] : no accessory muscle use [Auscultation Breath Sounds / Voice Sounds] : lungs were clear to auscultation bilaterally [Heart Rate And Rhythm] : heart rate was normal and rhythm regular [Heart Sounds] : normal S1 and S2 [Heart Sounds Gallop] : no gallops [Murmurs] : no murmurs [Heart Sounds Pericardial Friction Rub] : no pericardial rub [Bowel Sounds] : normal bowel sounds [Abdomen Tenderness] : non-tender [Abdomen Soft] : soft [Cervical Lymph Nodes Enlarged Posterior Bilaterally] : posterior cervical [Cervical Lymph Nodes Enlarged Anterior Bilaterally] : anterior cervical [Supraclavicular Lymph Nodes Enlarged Bilaterally] : supraclavicular [No CVA Tenderness] : no ~M costovertebral angle tenderness [No Spinal Tenderness] : no spinal tenderness [Abnormal Walk] : normal gait [Nail Clubbing] : no clubbing  or cyanosis of the fingernails [Musculoskeletal - Swelling] : no joint swelling seen [Motor Tone] : muscle strength and tone were normal [Skin Color & Pigmentation] : normal skin color and pigmentation [No Focal Deficits] : no focal deficits [FreeTextEntry1] : see hpi

## 2021-05-17 NOTE — HISTORY OF PRESENT ILLNESS
[FreeTextEntry1] : 50 year old female with SLE diagnosed 2016 with features that include malar rash, oral ulcers, arthritis, pericarditis, DNA+, alopecia, fever.  Of note, she is ROSE-.  Her serologies include - Sm/RNP/Ro/La/aPL; she has never been hypocomplementemic.  Her ESR has been persistently elevated. She has had a skin biopsy with interface dermatitis; her most pressing complaints have been of joint pain and stiffness, malar rash, fatigue.\par \par Febrary 8, 2021\par f/u SLE, on Methotrexate 12.5mg x4 weeks.  She continues on plaquenil and colchicine; MMF was tapered over 3 weeks since her last visit on January 12.  She notes marked improvement and has no pain.  Her previous stiffness has lessoned to ~1 hour (and there have been recent days when she had no stiffness).  She additionally feels markedly more energetic.  There is increased alopecia (?medication related), no rash, nausea, vomiting, abdominal pain, edema, ulcers, lymphadenopathy, fever, anorexia, weight loss, chest pain, shortness of breath, nausea, vomiting, edema, anosmia or ageusia.  She had a UTI the week of Jan 25--received antibiotics from pcp; also with tooth extraction 2/3 (and given amox for 5 days). She notes dryness of her skin (particularly of her hands)\par \par April 19, 2019  PtGA 1.1\par f/u SLE; continues to feel stiff over this past month with occasional swelling of left ankle. However, feels that she may be slightly improved compared to last visit. Methotrexate increased last visit from 12.5 to 15mg/week. Has had nausea following methotrexate on one of her recent doses. Notes that her dip's have developed swelling and cysts (which was exacerbated post-vaccine). Of note,she has a family history of hand oa in females. States that hair continues to be thin. No rash, alopecia, fever, anorexia, weight loss, lymphadenopathy, fatigue, dry eyes, chest pain, shortness of breath, cough, anosmia, ageusia, nausea, vomiting, diarrhea, abdominal pain. She will be visiting her sons in [place] for a college graduation, both sons have received the first dose of the Moderna vaccine.\par \par May 17, 2021  PtGA 1.2\par f/u SLE, feels well--has been taking increased MTX (17.5) for 3 weeks.  Feels fatigue the following day.  Doing well until a few days ago when she notes some facial erythema and some arthralgia of ankles and elbows.  No am stiffness or joint swelling. Noted an oral ulcer on palatte ~2 weeks ago. Went to [place] for sons' graduation. Otherwise, no fever, other rash, headache, anorexia, weight loss, alopecia (hair remains thin), lymphadenopathy, chills, jdry eyes, chest pain, shortness of breath, cough, anosmia, ageusia, nausea, vomiting, diarrhea, abdominal pain.  Of note, ESR decrease to 43 last visit.\par  \par Meds: Plaquenil 400mg (last optho ~1yr ago), colchicine .6mg bid, methotrexate, 17.5mg q week (Sat), folic acid 1mg\par \par PE:  135/82  245 lb, 13oz\par Skin: mild malar erythema and exematous rash on dorsum of bilateral hands; hair thin (but unchanged), no synovitis  but tender right 3rd MCP and elbow, bilateral ankles; +oral ulcer on hard palatte; No nasal ulcerations, lymphadenopathy, periungal erythema, edema.\par Lungs: Clear, Cor: RRR w/o m/r/g, Abd: soft, NT, BS+, Ext: no CCE, Neuro-gait normal\par \par Imp\par SLE with improvement of her musculoskeletal symptoms with increased methotrexate (with residual joint tenderness).  Will continue current dose and consider increase if symptoms do not resolve; will check labs. \par HM--Exemaa on bilateral hands--will give westcort cream\par COVID-Pt to follow CDC guidelines\par

## 2021-05-24 LAB
ALBUMIN SERPL ELPH-MCNC: 4.3 G/DL
ALP BLD-CCNC: 153 U/L
ALT SERPL-CCNC: 13 U/L
ANION GAP SERPL CALC-SCNC: 16 MMOL/L
APPEARANCE: CLEAR
AST SERPL-CCNC: 21 U/L
BASOPHILS # BLD AUTO: 0.06 K/UL
BASOPHILS NFR BLD AUTO: 0.9 %
BILIRUB SERPL-MCNC: 0.2 MG/DL
BILIRUBIN URINE: NEGATIVE
BLOOD URINE: NORMAL
BUN SERPL-MCNC: 13 MG/DL
C3 SERPL-MCNC: 175 MG/DL
C4 SERPL-MCNC: 27 MG/DL
CALCIUM SERPL-MCNC: 9.3 MG/DL
CHLORIDE SERPL-SCNC: 99 MMOL/L
CO2 SERPL-SCNC: 24 MMOL/L
COLOR: YELLOW
CREAT SERPL-MCNC: 0.75 MG/DL
DSDNA AB SER-ACNC: <12 IU/ML
EOSINOPHIL # BLD AUTO: 0.31 K/UL
EOSINOPHIL NFR BLD AUTO: 4.7 %
ERYTHROCYTE [SEDIMENTATION RATE] IN BLOOD BY WESTERGREN METHOD: 73 MM/HR
GLUCOSE QUALITATIVE U: NEGATIVE
GLUCOSE SERPL-MCNC: 32 MG/DL
HCT VFR BLD CALC: 37.8 %
HGB BLD-MCNC: 11.7 G/DL
IMM GRANULOCYTES NFR BLD AUTO: 0.3 %
KETONES URINE: NEGATIVE
LEUKOCYTE ESTERASE URINE: NEGATIVE
LYMPHOCYTES # BLD AUTO: 1.76 K/UL
LYMPHOCYTES NFR BLD AUTO: 26.7 %
MAN DIFF?: NORMAL
MCHC RBC-ENTMCNC: 28.5 PG
MCHC RBC-ENTMCNC: 31 GM/DL
MCV RBC AUTO: 92 FL
MONOCYTES # BLD AUTO: 0.43 K/UL
MONOCYTES NFR BLD AUTO: 6.5 %
NEUTROPHILS # BLD AUTO: 4.02 K/UL
NEUTROPHILS NFR BLD AUTO: 60.9 %
NITRITE URINE: NEGATIVE
PH URINE: 6
PLATELET # BLD AUTO: 367 K/UL
POTASSIUM SERPL-SCNC: 4.4 MMOL/L
PROT SERPL-MCNC: 7.2 G/DL
PROTEIN URINE: NORMAL
RBC # BLD: 4.11 M/UL
RBC # FLD: 14.4 %
SODIUM SERPL-SCNC: 139 MMOL/L
SPECIFIC GRAVITY URINE: 1.02
UROBILINOGEN URINE: NORMAL
WBC # FLD AUTO: 6.6 K/UL

## 2021-06-28 ENCOUNTER — APPOINTMENT (OUTPATIENT)
Dept: RHEUMATOLOGY | Facility: CLINIC | Age: 51
End: 2021-06-28

## 2021-06-28 VITALS
WEIGHT: 254.2 LBS | OXYGEN SATURATION: 97 % | RESPIRATION RATE: 14 BRPM | TEMPERATURE: 97.2 F | DIASTOLIC BLOOD PRESSURE: 90 MMHG | BODY MASS INDEX: 46.49 KG/M2 | SYSTOLIC BLOOD PRESSURE: 136 MMHG | HEART RATE: 84 BPM

## 2021-06-28 NOTE — PHYSICAL EXAM
[General Appearance - Alert] : alert [General Appearance - Well Nourished] : well nourished [Extraocular Movements] : extraocular movements were intact [General Appearance - Well Developed] : well developed [Outer Ear] : the ears and nose were normal in appearance [Examination Of The Oral Cavity] : the lips and gums were normal [Nasal Cavity] : the nasal mucosa and septum were normal [Neck Appearance] : the appearance of the neck was normal [] : no respiratory distress [Exaggerated Use Of Accessory Muscles For Inspiration] : no accessory muscle use [Auscultation Breath Sounds / Voice Sounds] : lungs were clear to auscultation bilaterally [Heart Rate And Rhythm] : heart rate was normal and rhythm regular [Heart Sounds] : normal S1 and S2 [Heart Sounds Gallop] : no gallops [Murmurs] : no murmurs [Heart Sounds Pericardial Friction Rub] : no pericardial rub [Bowel Sounds] : normal bowel sounds [Abdomen Soft] : soft [Abdomen Tenderness] : non-tender [Cervical Lymph Nodes Enlarged Posterior Bilaterally] : posterior cervical [Cervical Lymph Nodes Enlarged Anterior Bilaterally] : anterior cervical [Supraclavicular Lymph Nodes Enlarged Bilaterally] : supraclavicular [No CVA Tenderness] : no ~M costovertebral angle tenderness [No Spinal Tenderness] : no spinal tenderness [Abnormal Walk] : normal gait [Nail Clubbing] : no clubbing  or cyanosis of the fingernails [Musculoskeletal - Swelling] : no joint swelling seen [Motor Tone] : muscle strength and tone were normal [Skin Color & Pigmentation] : normal skin color and pigmentation [No Focal Deficits] : no focal deficits [FreeTextEntry1] : see hpi

## 2021-06-28 NOTE — HISTORY OF PRESENT ILLNESS
[FreeTextEntry1] : 50 year old female with SLE diagnosed 2016 with features that include malar rash, oral ulcers, arthritis, pericarditis, DNA+, alopecia, fever.  Of note, she is ROSE-.  Her serologies include - Sm/RNP/Ro/La/aPL; she has never been hypocomplementemic.  Her ESR has been persistently elevated. She has had a skin biopsy with interface dermatitis; her most pressing complaints have been of joint pain and stiffness, malar rash, fatigue.\par \par Febrary 8, 2021\par f/u SLE, on Methotrexate 12.5mg x4 weeks.  She continues on plaquenil and colchicine; MMF was tapered over 3 weeks since her last visit on January 12.  She notes marked improvement and has no pain.  Her previous stiffness has lessoned to ~1 hour (and there have been recent days when she had no stiffness).  She additionally feels markedly more energetic.  There is increased alopecia (?medication related), no rash, nausea, vomiting, abdominal pain, edema, ulcers, lymphadenopathy, fever, anorexia, weight loss, chest pain, shortness of breath, nausea, vomiting, edema, anosmia or ageusia.  She had a UTI the week of Jan 25--received antibiotics from pcp; also with tooth extraction 2/3 (and given amox for 5 days). She notes dryness of her skin (particularly of her hands)\par \par April 19, 2019  PtGA 1.1\par f/u SLE; continues to feel stiff over this past month with occasional swelling of left ankle. However, feels that she may be slightly improved compared to last visit. Methotrexate increased last visit from 12.5 to 15mg/week. Has had nausea following methotrexate on one of her recent doses. Notes that her dip's have developed swelling and cysts (which was exacerbated post-vaccine). Of note,she has a family history of hand oa in females. States that hair continues to be thin. No rash, alopecia, fever, anorexia, weight loss, lymphadenopathy, fatigue, dry eyes, chest pain, shortness of breath, cough, anosmia, ageusia, nausea, vomiting, diarrhea, abdominal pain. She will be visiting her sons in [place] for a college graduation, both sons have received the first dose of the Moderna vaccine.\par \par May 17, 2021  PtGA 1.2\par f/u SLE, feels well--has been taking increased MTX (17.5) for 3 weeks.  Feels fatigue the following day.  Doing well until a few days ago when she notes some facial erythema and some arthralgia of ankles and elbows.  No am stiffness or joint swelling. Noted an oral ulcer on palatte ~2 weeks ago. Went to [place] for sons' graduation. Otherwise, no fever, other rash, headache, anorexia, weight loss, alopecia (hair remains thin), lymphadenopathy, chills, jdry eyes, chest pain, shortness of breath, cough, anosmia, ageusia, nausea, vomiting, diarrhea, abdominal pain.  Of note, ESR decrease to 43 last visit.\par \par June 28, 2021 PtGA 0.3\par f/u SLE--feeling very well on MTX 17.5.  She has tolerated the dose without nausea for the last 2 weeks by holding the colchicine and plaquenil on the day she takes her methotrexae. She was taken off her oral contraeptive by gyn ~4 weeks ago--she had one break-through bleeding (followed by a UTI) for which she received macrobid x 4 days.  Her joints have been "really good" with resolution off tenderness of  right 3rd MCP and elbow; but persistent (but less) mild pain of ankles, there has been no swelling, warmth or stiffness.  She has noticed erythema of her face (she has not used any steroid cream)--the previously seen exematous rash on dorsum of bilateral hands has cleared with steroid cream application.  No fever, rash, anorexia, weight loss, alopecia, lymphadenopathy, chills, oral ulcers (previous oral ulcer has resolved), fatigue, dry eyes, chest pain, shortness of breath, cough, anosmia, ageusia, nausea, vomiting, diarrhea, abdominal pain. \par  \par Meds: Plaquenil 400mg (last optho ~1yr ago), colchicine .6mg bid, methotrexate, 17.5mg q week (Sat), folic acid 1mg\par \par PE:  136/90  254\par Skin: mild malar erythema a; hair thin (but unchanged), no synovitis but mild tenderness of bilateral ankles; no nasal/oral ulcerations;  lymphadenopathy, periungal erythema, edema.\par Lungs: Clear, Cor: RRR w/o m/r/g, Abd: soft, NT, BS+, Ext: no CCE, Neuro-gait normal\par \par Imp\par SLE with improvement of her musculoskeletal symptoms with increased methotrexate and discontination of oral estrogen.likely contribution to her improvement.  Will continue current dose and will check labs. GI symptoms around methtrexate have resolved (have suggested that she retry plaquenil along with the mtx)\par HM--Exemaa on bilateral hands has resolved.\par COVID-Pt to follow CDC guidelines--will check antibody level.\par

## 2021-08-02 LAB
ALBUMIN SERPL ELPH-MCNC: 4.4 G/DL
ALP BLD-CCNC: 228 U/L
ALT SERPL-CCNC: 60 U/L
ANION GAP SERPL CALC-SCNC: 8 MMOL/L
APPEARANCE: CLEAR
AST SERPL-CCNC: 59 U/L
BASOPHILS # BLD AUTO: 0.07 K/UL
BASOPHILS NFR BLD AUTO: 1 %
BILIRUB SERPL-MCNC: 0.2 MG/DL
BILIRUBIN URINE: NEGATIVE
BLOOD URINE: NEGATIVE
BUN SERPL-MCNC: 12 MG/DL
C3 SERPL-MCNC: 181 MG/DL
C4 SERPL-MCNC: 29 MG/DL
CALCIUM SERPL-MCNC: 9.3 MG/DL
CHLORIDE SERPL-SCNC: 101 MMOL/L
CO2 SERPL-SCNC: 26 MMOL/L
COLOR: NORMAL
COVID-19 SPIKE DOMAIN ANTIBODY INTERPRETATION: POSITIVE
CREAT SERPL-MCNC: 0.69 MG/DL
DSDNA AB SER-ACNC: <12 IU/ML
EOSINOPHIL # BLD AUTO: 0.35 K/UL
EOSINOPHIL NFR BLD AUTO: 5.2 %
ERYTHROCYTE [SEDIMENTATION RATE] IN BLOOD BY WESTERGREN METHOD: 87 MM/HR
GLUCOSE QUALITATIVE U: NEGATIVE
GLUCOSE SERPL-MCNC: 59 MG/DL
HCT VFR BLD CALC: 39.6 %
HGB BLD-MCNC: 12 G/DL
IMM GRANULOCYTES NFR BLD AUTO: 0.3 %
KETONES URINE: NEGATIVE
LEUKOCYTE ESTERASE URINE: NEGATIVE
LYMPHOCYTES # BLD AUTO: 1.96 K/UL
LYMPHOCYTES NFR BLD AUTO: 28.9 %
MAN DIFF?: NORMAL
MCHC RBC-ENTMCNC: 27.5 PG
MCHC RBC-ENTMCNC: 30.3 GM/DL
MCV RBC AUTO: 90.8 FL
MONOCYTES # BLD AUTO: 0.55 K/UL
MONOCYTES NFR BLD AUTO: 8.1 %
NEUTROPHILS # BLD AUTO: 3.84 K/UL
NEUTROPHILS NFR BLD AUTO: 56.5 %
NITRITE URINE: NEGATIVE
PH URINE: 6
PLATELET # BLD AUTO: 355 K/UL
POTASSIUM SERPL-SCNC: 4 MMOL/L
PROT SERPL-MCNC: 7.3 G/DL
PROTEIN URINE: NEGATIVE
RBC # BLD: 4.36 M/UL
RBC # FLD: 14.1 %
SARS-COV-2 AB SERPL IA-ACNC: >250 U/ML
SODIUM SERPL-SCNC: 135 MMOL/L
SPECIFIC GRAVITY URINE: 1.01
UROBILINOGEN URINE: NORMAL
WBC # FLD AUTO: 6.79 K/UL

## 2021-08-08 ENCOUNTER — LABORATORY RESULT (OUTPATIENT)
Age: 51
End: 2021-08-08

## 2021-08-09 ENCOUNTER — APPOINTMENT (OUTPATIENT)
Dept: RHEUMATOLOGY | Facility: CLINIC | Age: 51
End: 2021-08-09

## 2021-08-09 VITALS
DIASTOLIC BLOOD PRESSURE: 70 MMHG | BODY MASS INDEX: 44.39 KG/M2 | HEART RATE: 97 BPM | TEMPERATURE: 97.3 F | RESPIRATION RATE: 16 BRPM | OXYGEN SATURATION: 99 % | WEIGHT: 242.7 LBS | SYSTOLIC BLOOD PRESSURE: 122 MMHG

## 2021-08-09 NOTE — PHYSICAL EXAM
[General Appearance - Alert] : alert [General Appearance - Well Nourished] : well nourished [General Appearance - Well Developed] : well developed [Extraocular Movements] : extraocular movements were intact [Outer Ear] : the ears and nose were normal in appearance [Examination Of The Oral Cavity] : the lips and gums were normal [Nasal Cavity] : the nasal mucosa and septum were normal [Neck Appearance] : the appearance of the neck was normal [] : no respiratory distress [Exaggerated Use Of Accessory Muscles For Inspiration] : no accessory muscle use [Auscultation Breath Sounds / Voice Sounds] : lungs were clear to auscultation bilaterally [Heart Rate And Rhythm] : heart rate was normal and rhythm regular [Heart Sounds] : normal S1 and S2 [Heart Sounds Gallop] : no gallops [Murmurs] : no murmurs [Heart Sounds Pericardial Friction Rub] : no pericardial rub [Bowel Sounds] : normal bowel sounds [Abdomen Soft] : soft [Abdomen Tenderness] : non-tender [Cervical Lymph Nodes Enlarged Posterior Bilaterally] : posterior cervical [Cervical Lymph Nodes Enlarged Anterior Bilaterally] : anterior cervical [Supraclavicular Lymph Nodes Enlarged Bilaterally] : supraclavicular [No CVA Tenderness] : no ~M costovertebral angle tenderness [No Spinal Tenderness] : no spinal tenderness [Abnormal Walk] : normal gait [Nail Clubbing] : no clubbing  or cyanosis of the fingernails [Musculoskeletal - Swelling] : no joint swelling seen [Motor Tone] : muscle strength and tone were normal [Skin Color & Pigmentation] : normal skin color and pigmentation [No Focal Deficits] : no focal deficits [FreeTextEntry1] : see hpi

## 2021-08-09 NOTE — HISTORY OF PRESENT ILLNESS
[FreeTextEntry1] : 50 year old female with SLE diagnosed 2016 with features that include malar rash, oral ulcers, arthritis, pericarditis, DNA+, alopecia, fever.  Of note, she is ROSE-.  Her serologies include - Sm/RNP/Ro/La/aPL; she has never been hypocomplementemic.  Her ESR has been persistently elevated. She has had a skin biopsy with interface dermatitis; her most pressing complaints have been of joint pain and stiffness, malar rash, fatigue.\par \par Febrary 8, 2021\par f/u SLE, on Methotrexate 12.5mg x4 weeks.  She continues on plaquenil and colchicine; MMF was tapered over 3 weeks since her last visit on January 12.  She notes marked improvement and has no pain.  Her previous stiffness has lessoned to ~1 hour (and there have been recent days when she had no stiffness).  She additionally feels markedly more energetic.  There is increased alopecia (?medication related), no rash, nausea, vomiting, abdominal pain, edema, ulcers, lymphadenopathy, fever, anorexia, weight loss, chest pain, shortness of breath, nausea, vomiting, edema, anosmia or ageusia.  She had a UTI the week of Jan 25--received antibiotics from pcp; also with tooth extraction 2/3 (and given amox for 5 days). She notes dryness of her skin (particularly of her hands)\par \par April 19, 2019  PtGA 1.1\par f/u SLE; continues to feel stiff over this past month with occasional swelling of left ankle. However, feels that she may be slightly improved compared to last visit. Methotrexate increased last visit from 12.5 to 15mg/week. Has had nausea following methotrexate on one of her recent doses. Notes that her dip's have developed swelling and cysts (which was exacerbated post-vaccine). Of note,she has a family history of hand oa in females. States that hair continues to be thin. No rash, alopecia, fever, anorexia, weight loss, lymphadenopathy, fatigue, dry eyes, chest pain, shortness of breath, cough, anosmia, ageusia, nausea, vomiting, diarrhea, abdominal pain. She will be visiting her sons in [place] for a college graduation, both sons have received the first dose of the Moderna vaccine.\par \par May 17, 2021  PtGA 1.2\par f/u SLE, feels well--has been taking increased MTX (17.5) for 3 weeks.  Feels fatigue the following day.  Doing well until a few days ago when she notes some facial erythema and some arthralgia of ankles and elbows.  No am stiffness or joint swelling. Noted an oral ulcer on palatte ~2 weeks ago. Went to [place] for sons' graduation. Otherwise, no fever, other rash, headache, anorexia, weight loss, alopecia (hair remains thin), lymphadenopathy, chills, jdry eyes, chest pain, shortness of breath, cough, anosmia, ageusia, nausea, vomiting, diarrhea, abdominal pain.  Of note, ESR decrease to 43 last visit.\par \par June 28, 2021 PtGA 0.3\par f/u SLE--feeling very well on MTX 17.5.  She has tolerated the dose without nausea for the last 2 weeks by holding the colchicine and plaquenil on the day she takes her methotrexae. She was taken off her oral contraeptive by gyn ~4 weeks ago--she had one break-through bleeding (followed by a UTI) for which she received macrobid x 4 days.  Her joints have been "really good" with resolution off tenderness of  right 3rd MCP and elbow; but persistent (but less) mild pain of ankles, there has been no swelling, warmth or stiffness.  She has noticed erythema of her face (she has not used any steroid cream)--the previously seen exematous rash on dorsum of bilateral hands has cleared with steroid cream application.  No fever, rash, anorexia, weight loss, alopecia, lymphadenopathy, chills, oral ulcers (previous oral ulcer has resolved), fatigue, dry eyes, chest pain, shortness of breath, cough, anosmia, ageusia, nausea, vomiting, diarrhea, abdominal pain. \par \par Aug 9, 2021 PtGA 2.0\par f/u SLE, was doing well until 2 weeks ago when she noted onset of stiffness and pain in bilateral wrists and shoulders.  She did not have associated swelling in these joints, nor in her ankles (previously affected).  She states that she has been experiencing many hot flashes since the discontinuation of her estrogen and is sleeping poorly.  Her previous fatigue has returned.  Alopecia continues but no rash (she has used steroid cream intermittently, fever, anorexia, weight loss, lymphadenopathy, chills, oral ulcers, dry eyes, chest pain, shortness of breath, cough, anosmia, ageusia, nausea, vomiting, diarrhea, abdominal pain. She notes that she is fatigued most of Sunday (she takes her methotrexate on Saturday night).\par  \par Meds: Plaquenil 400mg (last optho ?6m ago), colchicine .6mg bid, methotrexate, 17.5mg q week (Sat), folic acid 1mg\par \par PE:  122/70  242\par Skin: no malar erythema a; hair thin (but unchanged), no synovitis or tenderness of joints including wrists, shoulders, elbows, MCPs, PIPs and ankles; no nasal/oral ulcerations;  lymphadenopathy, periungal erythema, edema.\par Lungs: Clear, Cor: RRR w/o m/r/g, Abd: soft, NT, BS+, Ext: no CCE, Neuro-gait normal\par \par Imp\par SLE with joint stiffness and fatigue.  Her hot flashes have resulted in poor sleep and it's unclear if this is contributing to her symptoms.  Have discussed "alternative non-pharmacologic" treatments that have been tried for post-menopausal hot flashes.  Have additionally discussed taking methotrexate in 2 doses, 12 hours apart.  Will check labs (and would consider increase of methotrexate if labs suggest increased disease activity). \par HM--Exemaa on bilateral hands has resolved.\par COVID-Pt is s/p vaccination (full) with high antibody titers.

## 2021-08-18 LAB
ALBUMIN SERPL ELPH-MCNC: 4.2 G/DL
ALP BLD-CCNC: 323 U/L
ALT SERPL-CCNC: 60 U/L
ANION GAP SERPL CALC-SCNC: 24 MMOL/L
APPEARANCE: ABNORMAL
AST SERPL-CCNC: 55 U/L
BASOPHILS # BLD AUTO: 0.05 K/UL
BASOPHILS NFR BLD AUTO: 0.7 %
BILIRUB SERPL-MCNC: 0.3 MG/DL
BILIRUBIN URINE: NEGATIVE
BLOOD URINE: NEGATIVE
BUN SERPL-MCNC: 13 MG/DL
C3 SERPL-MCNC: 187 MG/DL
C4 SERPL-MCNC: 28 MG/DL
CALCIUM SERPL-MCNC: 9.6 MG/DL
CHLORIDE SERPL-SCNC: 97 MMOL/L
CO2 SERPL-SCNC: 22 MMOL/L
COLOR: YELLOW
CREAT SERPL-MCNC: 0.71 MG/DL
CRP SERPL-MCNC: 38 MG/L
DSDNA AB SER-ACNC: <12 IU/ML
EOSINOPHIL # BLD AUTO: 0.4 K/UL
EOSINOPHIL NFR BLD AUTO: 5.9 %
ERYTHROCYTE [SEDIMENTATION RATE] IN BLOOD BY WESTERGREN METHOD: 81 MM/HR
GLUCOSE QUALITATIVE U: NEGATIVE
GLUCOSE SERPL-MCNC: 26 MG/DL
HCT VFR BLD CALC: 38.5 %
HGB BLD-MCNC: 11.4 G/DL
IMM GRANULOCYTES NFR BLD AUTO: 0.1 %
KETONES URINE: NEGATIVE
LEUKOCYTE ESTERASE URINE: NEGATIVE
LYMPHOCYTES # BLD AUTO: 1.69 K/UL
LYMPHOCYTES NFR BLD AUTO: 24.9 %
MAN DIFF?: NORMAL
MCHC RBC-ENTMCNC: 27.7 PG
MCHC RBC-ENTMCNC: 29.6 GM/DL
MCV RBC AUTO: 93.4 FL
MONOCYTES # BLD AUTO: 0.46 K/UL
MONOCYTES NFR BLD AUTO: 6.8 %
NEUTROPHILS # BLD AUTO: 4.18 K/UL
NEUTROPHILS NFR BLD AUTO: 61.6 %
NITRITE URINE: POSITIVE
PH URINE: 6
PLATELET # BLD AUTO: 341 K/UL
POTASSIUM SERPL-SCNC: 4.5 MMOL/L
PROT SERPL-MCNC: 7.1 G/DL
PROTEIN URINE: NEGATIVE
RBC # BLD: 4.12 M/UL
RBC # FLD: 14.6 %
SODIUM SERPL-SCNC: 142 MMOL/L
SPECIFIC GRAVITY URINE: 1.02
UROBILINOGEN URINE: NORMAL
WBC # FLD AUTO: 6.79 K/UL

## 2021-09-20 ENCOUNTER — APPOINTMENT (OUTPATIENT)
Dept: RHEUMATOLOGY | Facility: CLINIC | Age: 51
End: 2021-09-20

## 2021-09-20 VITALS
SYSTOLIC BLOOD PRESSURE: 124 MMHG | RESPIRATION RATE: 16 BRPM | WEIGHT: 244 LBS | OXYGEN SATURATION: 97 % | BODY MASS INDEX: 44.63 KG/M2 | HEART RATE: 70 BPM | DIASTOLIC BLOOD PRESSURE: 79 MMHG | TEMPERATURE: 97.8 F

## 2021-09-20 NOTE — PHYSICAL EXAM
[General Appearance - Alert] : alert [General Appearance - Well Nourished] : well nourished [General Appearance - Well Developed] : well developed [Extraocular Movements] : extraocular movements were intact [Outer Ear] : the ears and nose were normal in appearance [Examination Of The Oral Cavity] : the lips and gums were normal [Nasal Cavity] : the nasal mucosa and septum were normal [Neck Appearance] : the appearance of the neck was normal [] : no respiratory distress [Exaggerated Use Of Accessory Muscles For Inspiration] : no accessory muscle use [Auscultation Breath Sounds / Voice Sounds] : lungs were clear to auscultation bilaterally [Heart Rate And Rhythm] : heart rate was normal and rhythm regular [Heart Sounds] : normal S1 and S2 [Heart Sounds Gallop] : no gallops [Heart Sounds Pericardial Friction Rub] : no pericardial rub [Murmurs] : no murmurs [Bowel Sounds] : normal bowel sounds [Abdomen Soft] : soft [Abdomen Tenderness] : non-tender [Cervical Lymph Nodes Enlarged Posterior Bilaterally] : posterior cervical [Cervical Lymph Nodes Enlarged Anterior Bilaterally] : anterior cervical [Supraclavicular Lymph Nodes Enlarged Bilaterally] : supraclavicular [No CVA Tenderness] : no ~M costovertebral angle tenderness [No Spinal Tenderness] : no spinal tenderness [Abnormal Walk] : normal gait [Musculoskeletal - Swelling] : no joint swelling seen [Nail Clubbing] : no clubbing  or cyanosis of the fingernails [Motor Tone] : muscle strength and tone were normal [Skin Color & Pigmentation] : normal skin color and pigmentation [No Focal Deficits] : no focal deficits [FreeTextEntry1] : see hpi

## 2021-09-20 NOTE — HISTORY OF PRESENT ILLNESS
[FreeTextEntry1] : 50 year old female with SLE diagnosed 2016 with features that include malar rash, oral ulcers, arthritis, pericarditis, DNA+, alopecia, fever.  Of note, she is ROSE-.  Her serologies include - Sm/RNP/Ro/La/aPL; she has never been hypocomplementemic.  Her ESR has been persistently elevated. She has had a skin biopsy with interface dermatitis; her most pressing complaints have been of joint pain and stiffness, malar rash, fatigue.\par \par Febrary 8, 2021\par f/u SLE, on Methotrexate 12.5mg x4 weeks.  She continues on plaquenil and colchicine; MMF was tapered over 3 weeks since her last visit on January 12.  She notes marked improvement and has no pain.  Her previous stiffness has lessoned to ~1 hour (and there have been recent days when she had no stiffness).  She additionally feels markedly more energetic.  There is increased alopecia (?medication related), no rash, nausea, vomiting, abdominal pain, edema, ulcers, lymphadenopathy, fever, anorexia, weight loss, chest pain, shortness of breath, nausea, vomiting, edema, anosmia or ageusia.  She had a UTI the week of Jan 25--received antibiotics from pcp; also with tooth extraction 2/3 (and given amox for 5 days). She notes dryness of her skin (particularly of her hands)\par \par April 19, 2019  PtGA 1.1\par f/u SLE; continues to feel stiff over this past month with occasional swelling of left ankle. However, feels that she may be slightly improved compared to last visit. Methotrexate increased last visit from 12.5 to 15mg/week. Has had nausea following methotrexate on one of her recent doses. Notes that her dip's have developed swelling and cysts (which was exacerbated post-vaccine). Of note,she has a family history of hand oa in females. States that hair continues to be thin. No rash, alopecia, fever, anorexia, weight loss, lymphadenopathy, fatigue, dry eyes, chest pain, shortness of breath, cough, anosmia, ageusia, nausea, vomiting, diarrhea, abdominal pain. She will be visiting her sons in [place] for a college graduation, both sons have received the first dose of the Moderna vaccine.\par \par May 17, 2021  PtGA 1.2\par f/u SLE, feels well--has been taking increased MTX (17.5) for 3 weeks.  Feels fatigue the following day.  Doing well until a few days ago when she notes some facial erythema and some arthralgia of ankles and elbows.  No am stiffness or joint swelling. Noted an oral ulcer on palatte ~2 weeks ago. Went to [place] for sons' graduation. Otherwise, no fever, other rash, headache, anorexia, weight loss, alopecia (hair remains thin), lymphadenopathy, chills, jdry eyes, chest pain, shortness of breath, cough, anosmia, ageusia, nausea, vomiting, diarrhea, abdominal pain.  Of note, ESR decrease to 43 last visit.\par \par June 28, 2021 PtGA 0.3\par f/u SLE--feeling very well on MTX 17.5.  She has tolerated the dose without nausea for the last 2 weeks by holding the colchicine and plaquenil on the day she takes her methotrexae. She was taken off her oral contraeptive by gyn ~4 weeks ago--she had one break-through bleeding (followed by a UTI) for which she received macrobid x 4 days.  Her joints have been "really good" with resolution off tenderness of  right 3rd MCP and elbow; but persistent (but less) mild pain of ankles, there has been no swelling, warmth or stiffness.  She has noticed erythema of her face (she has not used any steroid cream)--the previously seen exematous rash on dorsum of bilateral hands has cleared with steroid cream application.  No fever, rash, anorexia, weight loss, alopecia, lymphadenopathy, chills, oral ulcers (previous oral ulcer has resolved), fatigue, dry eyes, chest pain, shortness of breath, cough, anosmia, ageusia, nausea, vomiting, diarrhea, abdominal pain. \par \par Aug 9, 2021 PtGA 2.0\par f/u SLE, was doing well until 2 weeks ago when she noted onset of stiffness and pain in bilateral wrists and shoulders.  She did not have associated swelling in these joints, nor in her ankles (previously affected).  She states that she has been experiencing many hot flashes since the discontinuation of her estrogen and is sleeping poorly.  Her previous fatigue has returned.  Alopecia continues but no rash (she has used steroid cream intermittently, fever, anorexia, weight loss, lymphadenopathy, chills, oral ulcers, dry eyes, chest pain, shortness of breath, cough, anosmia, ageusia, nausea, vomiting, diarrhea, abdominal pain. She notes that she is fatigued most of Sunday (she takes her methotrexate on Saturday night).\par \par Sept 20, 2021 PtGA 1.8\par f/u SLE; doing well with marked improvement of joint pains, stiffness, alopecia.  No longer with paresthesias down her legs.She still has pains in joints affected by OA. Still with fatigue. Her labs showed increased liver enzymes including GGT; her ALK phos was fractionated with a "normal" percentage of bone and liver.  Her last methotrexate dose was 8/7.  She is additionally taking supplements (including collagen, magnesium, tumeric) and has been meditating.  She received her third dose of the covid vaccine this month (with fatigue and achyness).  She has not been using flexeril (it has made her feel groggy upon awakening)\par  \par Meds: Plaquenil 400mg (last optho ?6m ago), colchicine .6mg bid, methotrexate, 17.5mg q week (Sat), folic acid 1mg\par \par PE:  124/70  244\par Skin: no malar erythema a; hair thin (but unchanged), no synovitis or tenderness of joints including wrists, shoulders, elbows, MCPs, PIPs and ankles; no nasal/oral ulcerations;  lymphadenopathy, periungal erythema, edema.\par Lungs: Clear, Cor: RRR w/o m/r/g, Abd: soft, NT, BS+, Ext: no CCE, Neuro-gait normal\par \par Imp\par SLE doing well (and now off methotrexate).  Still with hot flashes have resulted in poor sleep.  Will check labs including ggt.  \par \par COVID-Pt is s/p vaccination (full) with high antibody titers.   s/p booster

## 2021-09-30 LAB
ALBUMIN SERPL ELPH-MCNC: 4.5 G/DL
ALP BLD-CCNC: 295 U/L
ALT SERPL-CCNC: 42 U/L
ANION GAP SERPL CALC-SCNC: 17 MMOL/L
APPEARANCE: CLEAR
AST SERPL-CCNC: 38 U/L
BASOPHILS # BLD AUTO: 0.06 K/UL
BASOPHILS NFR BLD AUTO: 0.9 %
BILIRUB SERPL-MCNC: 0.2 MG/DL
BILIRUBIN URINE: NEGATIVE
BLOOD URINE: NEGATIVE
BUN SERPL-MCNC: 18 MG/DL
C3 SERPL-MCNC: 176 MG/DL
C4 SERPL-MCNC: 28 MG/DL
CALCIUM SERPL-MCNC: 9.5 MG/DL
CHLORIDE SERPL-SCNC: 99 MMOL/L
CO2 SERPL-SCNC: 26 MMOL/L
COLOR: NORMAL
CREAT SERPL-MCNC: 0.67 MG/DL
DSDNA AB SER-ACNC: <12 IU/ML
EOSINOPHIL # BLD AUTO: 0.46 K/UL
EOSINOPHIL NFR BLD AUTO: 7.2 %
ERYTHROCYTE [SEDIMENTATION RATE] IN BLOOD BY WESTERGREN METHOD: 98 MM/HR
GGT SERPL-CCNC: 222 U/L
GLUCOSE QUALITATIVE U: NEGATIVE
GLUCOSE SERPL-MCNC: 58 MG/DL
HCT VFR BLD CALC: 38.2 %
HGB BLD-MCNC: 11.8 G/DL
IMM GRANULOCYTES NFR BLD AUTO: 0.3 %
KETONES URINE: NEGATIVE
LEUKOCYTE ESTERASE URINE: NEGATIVE
LYMPHOCYTES # BLD AUTO: 2.23 K/UL
LYMPHOCYTES NFR BLD AUTO: 34.9 %
MAN DIFF?: NORMAL
MCHC RBC-ENTMCNC: 27.5 PG
MCHC RBC-ENTMCNC: 30.9 GM/DL
MCV RBC AUTO: 89 FL
MONOCYTES # BLD AUTO: 0.54 K/UL
MONOCYTES NFR BLD AUTO: 8.5 %
NEUTROPHILS # BLD AUTO: 3.08 K/UL
NEUTROPHILS NFR BLD AUTO: 48.2 %
NITRITE URINE: NEGATIVE
PH URINE: 7
PLATELET # BLD AUTO: 296 K/UL
POTASSIUM SERPL-SCNC: 4.7 MMOL/L
PROT SERPL-MCNC: 7.3 G/DL
PROTEIN URINE: NEGATIVE
RBC # BLD: 4.29 M/UL
RBC # FLD: 13.9 %
SODIUM SERPL-SCNC: 142 MMOL/L
SPECIFIC GRAVITY URINE: 1.01
UROBILINOGEN URINE: NORMAL
WBC # FLD AUTO: 6.39 K/UL

## 2021-10-20 ENCOUNTER — APPOINTMENT (OUTPATIENT)
Dept: RHEUMATOLOGY | Facility: CLINIC | Age: 51
End: 2021-10-20

## 2021-10-20 VITALS
HEART RATE: 87 BPM | OXYGEN SATURATION: 95 % | RESPIRATION RATE: 16 BRPM | TEMPERATURE: 97.5 F | DIASTOLIC BLOOD PRESSURE: 83 MMHG | WEIGHT: 241 LBS | SYSTOLIC BLOOD PRESSURE: 136 MMHG | BODY MASS INDEX: 44.08 KG/M2

## 2021-10-22 LAB
APPEARANCE: CLEAR
BASOPHILS # BLD AUTO: 0.06 K/UL
BASOPHILS NFR BLD AUTO: 1 %
BILIRUBIN URINE: NEGATIVE
BLOOD URINE: NEGATIVE
C3 SERPL-MCNC: 162 MG/DL
C4 SERPL-MCNC: 29 MG/DL
COLOR: YELLOW
EOSINOPHIL # BLD AUTO: 0.45 K/UL
EOSINOPHIL NFR BLD AUTO: 7.8 %
ERYTHROCYTE [SEDIMENTATION RATE] IN BLOOD BY WESTERGREN METHOD: 99 MM/HR
GLUCOSE QUALITATIVE U: NEGATIVE
HCT VFR BLD CALC: 38.4 %
HGB BLD-MCNC: 11.7 G/DL
IMM GRANULOCYTES NFR BLD AUTO: 0.3 %
KETONES URINE: NEGATIVE
LEUKOCYTE ESTERASE URINE: NEGATIVE
LYMPHOCYTES # BLD AUTO: 1.99 K/UL
LYMPHOCYTES NFR BLD AUTO: 34.4 %
MAN DIFF?: NORMAL
MCHC RBC-ENTMCNC: 27.7 PG
MCHC RBC-ENTMCNC: 30.5 GM/DL
MCV RBC AUTO: 90.8 FL
MONOCYTES # BLD AUTO: 0.56 K/UL
MONOCYTES NFR BLD AUTO: 9.7 %
NEUTROPHILS # BLD AUTO: 2.7 K/UL
NEUTROPHILS NFR BLD AUTO: 46.8 %
NITRITE URINE: NEGATIVE
PH URINE: 6.5
PLATELET # BLD AUTO: 300 K/UL
PROTEIN URINE: NEGATIVE
RBC # BLD: 4.23 M/UL
RBC # FLD: 13.7 %
SPECIFIC GRAVITY URINE: 1.01
UROBILINOGEN URINE: NORMAL
WBC # FLD AUTO: 5.78 K/UL

## 2021-10-22 NOTE — HISTORY OF PRESENT ILLNESS
[FreeTextEntry1] : 50 year old female with SLE diagnosed 2016 with features that include malar rash, oral ulcers, arthritis, pericarditis, DNA+, alopecia, fever.  Of note, she is ROSE-.  Her serologies include - Sm/RNP/Ro/La/aPL; she has never been hypocomplementemic.  Her ESR has been persistently elevated. She has had a skin biopsy with interface dermatitis; her most pressing complaints have been of joint pain and stiffness, malar rash, fatigue.\par \par Febrary 8, 2021\par f/u SLE, on Methotrexate 12.5mg x4 weeks.  She continues on plaquenil and colchicine; MMF was tapered over 3 weeks since her last visit on January 12.  She notes marked improvement and has no pain.  Her previous stiffness has lessoned to ~1 hour (and there have been recent days when she had no stiffness).  She additionally feels markedly more energetic.  There is increased alopecia (?medication related), no rash, nausea, vomiting, abdominal pain, edema, ulcers, lymphadenopathy, fever, anorexia, weight loss, chest pain, shortness of breath, nausea, vomiting, edema, anosmia or ageusia.  She had a UTI the week of Jan 25--received antibiotics from pcp; also with tooth extraction 2/3 (and given amox for 5 days). She notes dryness of her skin (particularly of her hands)\par \par April 19, 2019  PtGA 1.1\par f/u SLE; continues to feel stiff over this past month with occasional swelling of left ankle. However, feels that she may be slightly improved compared to last visit. Methotrexate increased last visit from 12.5 to 15mg/week. Has had nausea following methotrexate on one of her recent doses. Notes that her dip's have developed swelling and cysts (which was exacerbated post-vaccine). Of note,she has a family history of hand oa in females. States that hair continues to be thin. No rash, alopecia, fever, anorexia, weight loss, lymphadenopathy, fatigue, dry eyes, chest pain, shortness of breath, cough, anosmia, ageusia, nausea, vomiting, diarrhea, abdominal pain. She will be visiting her sons in [place] for a college graduation, both sons have received the first dose of the Moderna vaccine.\par \par May 17, 2021  PtGA 1.2\par f/u SLE, feels well--has been taking increased MTX (17.5) for 3 weeks.  Feels fatigue the following day.  Doing well until a few days ago when she notes some facial erythema and some arthralgia of ankles and elbows.  No am stiffness or joint swelling. Noted an oral ulcer on palatte ~2 weeks ago. Went to [place] for sons' graduation. Otherwise, no fever, other rash, headache, anorexia, weight loss, alopecia (hair remains thin), lymphadenopathy, chills, jdry eyes, chest pain, shortness of breath, cough, anosmia, ageusia, nausea, vomiting, diarrhea, abdominal pain.  Of note, ESR decrease to 43 last visit.\par \par June 28, 2021 PtGA 0.3\par f/u SLE--feeling very well on MTX 17.5.  She has tolerated the dose without nausea for the last 2 weeks by holding the colchicine and plaquenil on the day she takes her methotrexae. She was taken off her oral contraeptive by gyn ~4 weeks ago--she had one break-through bleeding (followed by a UTI) for which she received macrobid x 4 days.  Her joints have been "really good" with resolution off tenderness of  right 3rd MCP and elbow; but persistent (but less) mild pain of ankles, there has been no swelling, warmth or stiffness.  She has noticed erythema of her face (she has not used any steroid cream)--the previously seen exematous rash on dorsum of bilateral hands has cleared with steroid cream application.  No fever, rash, anorexia, weight loss, alopecia, lymphadenopathy, chills, oral ulcers (previous oral ulcer has resolved), fatigue, dry eyes, chest pain, shortness of breath, cough, anosmia, ageusia, nausea, vomiting, diarrhea, abdominal pain. \par \par Aug 9, 2021 PtGA 2.0\par f/u SLE, was doing well until 2 weeks ago when she noted onset of stiffness and pain in bilateral wrists and shoulders.  She did not have associated swelling in these joints, nor in her ankles (previously affected).  She states that she has been experiencing many hot flashes since the discontinuation of her estrogen and is sleeping poorly.  Her previous fatigue has returned.  Alopecia continues but no rash (she has used steroid cream intermittently, fever, anorexia, weight loss, lymphadenopathy, chills, oral ulcers, dry eyes, chest pain, shortness of breath, cough, anosmia, ageusia, nausea, vomiting, diarrhea, abdominal pain. She notes that she is fatigued most of Sunday (she takes her methotrexate on Saturday night).\par \par Sept 20, 2021 PtGA 1.8\par f/u SLE; doing well with marked improvement of joint pains, stiffness, alopecia.  No longer with paresthesias down her legs.She still has pains in joints affected by OA. Still with fatigue. Her labs showed increased liver enzymes including GGT; her ALK phos was fractionated with a "normal" percentage of bone and liver.  Her last methotrexate dose was 8/7.  She is additionally taking supplements (including collagen, magnesium, tumeric) and has been meditating.  She received her third dose of the covid vaccine this month (with fatigue and achyness).  She has not been using flexeril (it has made her feel groggy upon awakening)\par \par October 22, 2021  PtGA 1.0\par f/u SLE continuing off methotrexate, with minimal signs/symptoms of "active SLE", i.e. no rash, alopecia (hair remains thin but is not falling out), joint pains/swelling, although she is beginning to note the onset of mild stiffness since off of methotrexate.  Fatigue is about the same (Still with hot flashes have resulted in poor sleep)..  No jaudice, fever, rash, anorexia, weight loss, lymphadenopathy, chills, oral ulcers, dry eyes, chest pain, shortness of breath, cough, anosmia, ageusia, nausea, vomiting, diarrhea, abdominal pain. Since last seen, she has had an abdominal ultrasound showing a fatty liver as well as a hypodense non-vascular intrahepatic structure with recommendation for a MRI with and without contrast.  The MRI showed multiple subcentimeter hepatic cysts as well as a 1.6x1.3 lesion, most likely a focal nodular hyperplasia and a second similarly appearing lesion in the subcapsular region of the right hepatic lobe.  I discussed these findings with the radiologist who recommended monitoring in 3-6 months.  The transaminases have returned to normal, her alk phos is lower and her ESR remains elevated.doing well (and now off methotrexate). \par  \par Meds: Plaquenil 400mg (last optho ?6m ago), colchicine .6mg bid, folic acid 1mg\par \par PE:  136/83  241\par Skin: no malar erythema a; hair thin (but unchanged), no synovitis or tenderness of joints including wrists, shoulders, elbows, MCPs, PIPs and ankles; no nasal/oral ulcerations;  lymphadenopathy, periungal erythema, edema.\par Lungs: Clear, Cor: RRR w/o m/r/g, Abd: soft, NT, BS+, Ext: no CCE, Neuro-gait normal\par \par Imp\par SLE hepatic lesions with abnormal chemistries of concern--[facility], however the next available appointment is in March.  Will email Dr Bar (with whom she has the appointment).  Will check labs including ggt.  \par \par COVID-Pt is s/p vaccination (full) with high antibody titers.   s/p booster\par HM:  She received this season's influenza vaccine last week.

## 2021-11-08 ENCOUNTER — APPOINTMENT (OUTPATIENT)
Dept: INTERNAL MEDICINE | Facility: CLINIC | Age: 51
End: 2021-11-08
Payer: COMMERCIAL

## 2021-11-08 ENCOUNTER — NON-APPOINTMENT (OUTPATIENT)
Age: 51
End: 2021-11-08

## 2021-11-08 VITALS
SYSTOLIC BLOOD PRESSURE: 120 MMHG | WEIGHT: 240 LBS | TEMPERATURE: 97.3 F | OXYGEN SATURATION: 95 % | RESPIRATION RATE: 14 BRPM | DIASTOLIC BLOOD PRESSURE: 70 MMHG | HEIGHT: 62 IN | BODY MASS INDEX: 44.16 KG/M2 | HEART RATE: 84 BPM

## 2021-11-08 DIAGNOSIS — Z87.898 PERSONAL HISTORY OF OTHER SPECIFIED CONDITIONS: ICD-10-CM

## 2021-11-08 PROCEDURE — 99386 PREV VISIT NEW AGE 40-64: CPT

## 2021-11-08 RX ORDER — METHOTREXATE 2.5 MG/1
2.5 TABLET ORAL
Qty: 85 | Refills: 0 | Status: COMPLETED | COMMUNITY
End: 2021-11-08

## 2021-11-08 NOTE — ASSESSMENT
[FreeTextEntry1] : SLE: On plaquenil. Follows with Dr Bliss. \par HCM: Up to date on mammogram. Referred to Dr Alcantara/Dr Soares for screening colonoscopy. Check labs. \par

## 2021-11-08 NOTE — REVIEW OF SYSTEMS
[Fever] : no fever [Chills] : no chills [Night Sweats] : no night sweats [Discharge] : no discharge [Vision Problems] : no vision problems [Itching] : no itching [Earache] : no earache [Nasal Discharge] : no nasal discharge [Sore Throat] : no sore throat [Chest Pain] : no chest pain [Palpitations] : no palpitations [Lower Ext Edema] : no lower extremity edema [Shortness Of Breath] : no shortness of breath [Wheezing] : no wheezing [Cough] : no cough [Dyspnea on Exertion] : no dyspnea on exertion [Abdominal Pain] : no abdominal pain [Nausea] : no nausea [Constipation] : no constipation [Diarrhea] : diarrhea [Vomiting] : no vomiting [Dysuria] : no dysuria [Muscle Weakness] : no muscle weakness [Muscle Pain] : no muscle pain [Itching] : no itching [Skin Rash] : no skin rash [Headache] : no headache [Dizziness] : no dizziness [Suicidal] : not suicidal [Anxiety] : no anxiety [Depression] : no depression [Easy Bleeding] : no easy bleeding [Easy Bruising] : no easy bruising [Swollen Glands] : no swollen glands

## 2021-11-08 NOTE — HEALTH RISK ASSESSMENT
[Patient reported mammogram was normal] : Patient reported mammogram was normal [Good] : ~his/her~  mood as  good [No] : No [0] : 2) Feeling down, depressed, or hopeless: Not at all (0) [PHQ-2 Negative - No further assessment needed] : PHQ-2 Negative - No further assessment needed [Fully functional (bathing, dressing, toileting, transferring, walking, feeding)] : Fully functional (bathing, dressing, toileting, transferring, walking, feeding) [Fully functional (using the telephone, shopping, preparing meals, housekeeping, doing laundry, using] : Fully functional and needs no help or supervision to perform IADLs (using the telephone, shopping, preparing meals, housekeeping, doing laundry, using transportation, managing medications and managing finances) [] : No [[id number]] : 0 [Change in mental status noted] : No change in mental status noted [Reports changes in hearing] : Reports no changes in hearing [Reports changes in vision] : Reports no changes in vision [MammogramDate] : 09/21

## 2021-11-15 ENCOUNTER — APPOINTMENT (OUTPATIENT)
Dept: RHEUMATOLOGY | Facility: CLINIC | Age: 51
End: 2021-11-15

## 2021-11-15 VITALS
BODY MASS INDEX: 43.53 KG/M2 | DIASTOLIC BLOOD PRESSURE: 72 MMHG | OXYGEN SATURATION: 99 % | HEART RATE: 85 BPM | SYSTOLIC BLOOD PRESSURE: 124 MMHG | WEIGHT: 238 LBS | RESPIRATION RATE: 14 BRPM | TEMPERATURE: 97.8 F

## 2021-11-15 LAB
ALBUMIN SERPL ELPH-MCNC: 4.4 G/DL
ALP BLD-CCNC: 309 U/L
ALT SERPL-CCNC: 43 U/L
ANION GAP SERPL CALC-SCNC: 13 MMOL/L
AST SERPL-CCNC: 41 U/L
BASOPHILS # BLD AUTO: 0.08 K/UL
BASOPHILS NFR BLD AUTO: 1.1 %
BILIRUB SERPL-MCNC: 0.2 MG/DL
BUN SERPL-MCNC: 15 MG/DL
CALCIUM SERPL-MCNC: 9.9 MG/DL
CHLORIDE SERPL-SCNC: 101 MMOL/L
CHOLEST SERPL-MCNC: 204 MG/DL
CO2 SERPL-SCNC: 26 MMOL/L
CREAT SERPL-MCNC: 0.7 MG/DL
EOSINOPHIL # BLD AUTO: 0.87 K/UL
EOSINOPHIL NFR BLD AUTO: 12.4 %
ESTIMATED AVERAGE GLUCOSE: 103 MG/DL
GLUCOSE SERPL-MCNC: 88 MG/DL
HBA1C MFR BLD HPLC: 5.2 %
HCT VFR BLD CALC: 39.4 %
HDLC SERPL-MCNC: 78 MG/DL
HGB BLD-MCNC: 12 G/DL
IMM GRANULOCYTES NFR BLD AUTO: 0.4 %
LDLC SERPL CALC-MCNC: 107 MG/DL
LYMPHOCYTES # BLD AUTO: 2.29 K/UL
LYMPHOCYTES NFR BLD AUTO: 32.5 %
MAN DIFF?: NORMAL
MCHC RBC-ENTMCNC: 27.7 PG
MCHC RBC-ENTMCNC: 30.5 GM/DL
MCV RBC AUTO: 91 FL
MONOCYTES # BLD AUTO: 0.56 K/UL
MONOCYTES NFR BLD AUTO: 8 %
NEUTROPHILS # BLD AUTO: 3.21 K/UL
NEUTROPHILS NFR BLD AUTO: 45.6 %
NONHDLC SERPL-MCNC: 126 MG/DL
PLATELET # BLD AUTO: 324 K/UL
POTASSIUM SERPL-SCNC: 4.2 MMOL/L
PROT SERPL-MCNC: 7.5 G/DL
RBC # BLD: 4.33 M/UL
RBC # FLD: 13.8 %
SODIUM SERPL-SCNC: 141 MMOL/L
TRIGL SERPL-MCNC: 99 MG/DL
TSH SERPL-ACNC: 1.3 UIU/ML
WBC # FLD AUTO: 7.04 K/UL

## 2021-11-15 NOTE — HISTORY OF PRESENT ILLNESS
[FreeTextEntry1] : 50 year old female with SLE diagnosed 2016 with features that include malar rash, oral ulcers, arthritis, pericarditis, DNA+, alopecia, fever.  Of note, she is ROSE-.  Her serologies include - Sm/RNP/Ro/La/aPL; she has never been hypocomplementemic.  Her ESR has been persistently elevated. She has had a skin biopsy with interface dermatitis; her most pressing complaints have been of joint pain and stiffness, malar rash, fatigue.\par \par Febrary 8, 2021\par f/u SLE, on Methotrexate 12.5mg x4 weeks.  She continues on plaquenil and colchicine; MMF was tapered over 3 weeks since her last visit on January 12.  She notes marked improvement and has no pain.  Her previous stiffness has lessoned to ~1 hour (and there have been recent days when she had no stiffness).  She additionally feels markedly more energetic.  There is increased alopecia (?medication related), no rash, nausea, vomiting, abdominal pain, edema, ulcers, lymphadenopathy, fever, anorexia, weight loss, chest pain, shortness of breath, nausea, vomiting, edema, anosmia or ageusia.  She had a UTI the week of Jan 25--received antibiotics from pcp; also with tooth extraction 2/3 (and given amox for 5 days). She notes dryness of her skin (particularly of her hands)\par \par April 19, 2019  PtGA 1.1\par f/u SLE; continues to feel stiff over this past month with occasional swelling of left ankle. However, feels that she may be slightly improved compared to last visit. Methotrexate increased last visit from 12.5 to 15mg/week. Has had nausea following methotrexate on one of her recent doses. Notes that her dip's have developed swelling and cysts (which was exacerbated post-vaccine). Of note,she has a family history of hand oa in females. States that hair continues to be thin. No rash, alopecia, fever, anorexia, weight loss, lymphadenopathy, fatigue, dry eyes, chest pain, shortness of breath, cough, anosmia, ageusia, nausea, vomiting, diarrhea, abdominal pain. She will be visiting her sons in [place] for a college graduation, both sons have received the first dose of the Moderna vaccine.\par \par May 17, 2021  PtGA 1.2\par f/u SLE, feels well--has been taking increased MTX (17.5) for 3 weeks.  Feels fatigue the following day.  Doing well until a few days ago when she notes some facial erythema and some arthralgia of ankles and elbows.  No am stiffness or joint swelling. Noted an oral ulcer on palatte ~2 weeks ago. Went to [place] for sons' graduation. Otherwise, no fever, other rash, headache, anorexia, weight loss, alopecia (hair remains thin), lymphadenopathy, chills, jdry eyes, chest pain, shortness of breath, cough, anosmia, ageusia, nausea, vomiting, diarrhea, abdominal pain.  Of note, ESR decrease to 43 last visit.\par \par June 28, 2021 PtGA 0.3\par f/u SLE--feeling very well on MTX 17.5.  She has tolerated the dose without nausea for the last 2 weeks by holding the colchicine and plaquenil on the day she takes her methotrexae. She was taken off her oral contraeptive by gyn ~4 weeks ago--she had one break-through bleeding (followed by a UTI) for which she received macrobid x 4 days.  Her joints have been "really good" with resolution off tenderness of  right 3rd MCP and elbow; but persistent (but less) mild pain of ankles, there has been no swelling, warmth or stiffness.  She has noticed erythema of her face (she has not used any steroid cream)--the previously seen exematous rash on dorsum of bilateral hands has cleared with steroid cream application.  No fever, rash, anorexia, weight loss, alopecia, lymphadenopathy, chills, oral ulcers (previous oral ulcer has resolved), fatigue, dry eyes, chest pain, shortness of breath, cough, anosmia, ageusia, nausea, vomiting, diarrhea, abdominal pain. \par \par Aug 9, 2021 PtGA 2.0\par f/u SLE, was doing well until 2 weeks ago when she noted onset of stiffness and pain in bilateral wrists and shoulders.  She did not have associated swelling in these joints, nor in her ankles (previously affected).  She states that she has been experiencing many hot flashes since the discontinuation of her estrogen and is sleeping poorly.  Her previous fatigue has returned.  Alopecia continues but no rash (she has used steroid cream intermittently, fever, anorexia, weight loss, lymphadenopathy, chills, oral ulcers, dry eyes, chest pain, shortness of breath, cough, anosmia, ageusia, nausea, vomiting, diarrhea, abdominal pain. She notes that she is fatigued most of Sunday (she takes her methotrexate on Saturday night).\par \par Sept 20, 2021 PtGA 1.8\par f/u SLE; doing well with marked improvement of joint pains, stiffness, alopecia.  No longer with paresthesias down her legs.She still has pains in joints affected by OA. Still with fatigue. Her labs showed increased liver enzymes including GGT; her ALK phos was fractionated with a "normal" percentage of bone and liver.  Her last methotrexate dose was 8/7.  She is additionally taking supplements (including collagen, magnesium, tumeric) and has been meditating.  She received her third dose of the covid vaccine this month (with fatigue and achyness).  She has not been using flexeril (it has made her feel groggy upon awakening)\par \par October 22, 2021  PtGA 1.0\par f/u SLE continuing off methotrexate, with minimal signs/symptoms of "active SLE", i.e. no rash, alopecia (hair remains thin but is not falling out), joint pains/swelling, although she is beginning to note the onset of mild stiffness since off of methotrexate.  Fatigue is about the same (Still with hot flashes have resulted in poor sleep)..  No jaudice, fever, rash, anorexia, weight loss, lymphadenopathy, chills, oral ulcers, dry eyes, chest pain, shortness of breath, cough, anosmia, ageusia, nausea, vomiting, diarrhea, abdominal pain. Since last seen, she has had an abdominal ultrasound showing a fatty liver as well as a hypodense non-vascular intrahepatic structure with recommendation for a MRI with and without contrast.  The MRI showed multiple subcentimeter hepatic cysts as well as a 1.6x1.3 lesion, most likely a focal nodular hyperplasia and a second similarly appearing lesion in the subcapsular region of the right hepatic lobe.  I discussed these findings with the radiologist who recommended monitoring in 3-6 months.  The transaminases have returned to normal, her alk phos is lower and her ESR remains elevated.doing well (and now off methotrexate). \par \par November 15, 2021 PtGA 1.4\par f/u SLE--she saw her pcp last week (Nov 8) and had blood obtained for HgA1c, lipids, thyroid as well as cbc and chem.  She had a painful right occipital lymphnode (last week) which is now not tender and ?diminishing.  The AST from the PCP's labs is minimally elevated, and her ALK phos is greater than previously.  She has no abdominal symptoms.  She has no joint pain or swelling but stiffness is persisting; she additionally notes a small palatal ulcer for ~1 week.  No fever, rash, anorexia, unintentional weight loss (she is dieting and losing weight slowly), alopecia, chills, nasal ulcers, dry eyes, chest pain, shortness of breath, cough, anosmia, ageusia, nausea, vomiting, diarrhea, abdominal pain. She has an appointment with Dr Bar (hepatology) on Dec 6.\par  \par Meds: Plaquenil 400mg (last optho ?6m ago), colchicine .6mg bid, folic acid 1mg\par \par PE:  124/72  238\par Skin: no malar erythema a; hair thin (but unchanged), no synovitis or tenderness of joints including wrists, shoulders, elbows, MCPs, PIPs and ankles; no nasal ulceration, but there is a small ~3mm palatal ulcer; +right occipital lymphnode, not fixed, non-tender  lymphadenopathy, periungal erythema, edema.\par Lungs: Clear, Cor: RRR w/o m/r/g, Abd: soft, NT, BS+, Ext: no CCE, Neuro-gait normal\par \par Imp\par SLE hepatic lesions with abnormal chemistries of concern--she now has an appointment on 12/6.  Will check lupus serologies, ESR and GGT.  Will continue plaquenil and colchicine.  Have discussed possible use of nasocort for palatal ulcer--however, she would prefer to wait at this time and will contact me if it worsens.  Unclear significance of rt occipital lymphnode (particularly as it appears to be resolving).  There is no history of a scalp or other infection.  Will continue to monitor.\par \par COVID-Pt is s/p vaccination (full) with high antibody titers.   s/p booster\par HM:  She received this season's influenza vaccine

## 2021-11-22 ENCOUNTER — APPOINTMENT (OUTPATIENT)
Dept: OBGYN | Facility: CLINIC | Age: 51
End: 2021-11-22
Payer: COMMERCIAL

## 2021-11-22 VITALS
WEIGHT: 236 LBS | DIASTOLIC BLOOD PRESSURE: 80 MMHG | SYSTOLIC BLOOD PRESSURE: 120 MMHG | BODY MASS INDEX: 41.82 KG/M2 | HEIGHT: 63 IN

## 2021-11-22 DIAGNOSIS — Z87.42 PERSONAL HISTORY OF OTHER DISEASES OF THE FEMALE GENITAL TRACT: ICD-10-CM

## 2021-11-22 DIAGNOSIS — B37.2 CANDIDIASIS OF SKIN AND NAIL: ICD-10-CM

## 2021-11-22 PROCEDURE — 36415 COLL VENOUS BLD VENIPUNCTURE: CPT

## 2021-11-22 PROCEDURE — 99386 PREV VISIT NEW AGE 40-64: CPT

## 2021-11-22 PROCEDURE — 82270 OCCULT BLOOD FECES: CPT

## 2021-11-22 RX ORDER — NYSTATIN 100000 1/G
100000 POWDER TOPICAL
Qty: 1 | Refills: 1 | Status: ACTIVE | COMMUNITY
Start: 2021-11-22 | End: 1900-01-01

## 2021-11-23 LAB
FSH SERPL-MCNC: 86.3 IU/L
HPV HIGH+LOW RISK DNA PNL CVX: NOT DETECTED

## 2021-11-29 LAB — CYTOLOGY CVX/VAG DOC THIN PREP: NORMAL

## 2021-12-02 LAB
ALBUMIN SERPL ELPH-MCNC: 4.4 G/DL
ALBUMIN SERPL ELPH-MCNC: 4.4 G/DL
ALP BLD-CCNC: 268 U/L
ALP BLD-CCNC: 273 U/L
ALT SERPL-CCNC: 26 U/L
ALT SERPL-CCNC: 33 U/L
ANION GAP SERPL CALC-SCNC: 14 MMOL/L
ANION GAP SERPL CALC-SCNC: 27 MMOL/L
APPEARANCE: CLEAR
AST SERPL-CCNC: 29 U/L
AST SERPL-CCNC: 34 U/L
BASOPHILS # BLD AUTO: 0.06 K/UL
BASOPHILS NFR BLD AUTO: 1 %
BILIRUB SERPL-MCNC: 0.2 MG/DL
BILIRUB SERPL-MCNC: <0.2 MG/DL
BILIRUBIN URINE: NEGATIVE
BLOOD URINE: NEGATIVE
BUN SERPL-MCNC: 14 MG/DL
BUN SERPL-MCNC: 14 MG/DL
C3 SERPL-MCNC: 220 MG/DL
C4 SERPL-MCNC: 29 MG/DL
CALCIUM SERPL-MCNC: 9.3 MG/DL
CALCIUM SERPL-MCNC: 9.6 MG/DL
CHLORIDE SERPL-SCNC: 101 MMOL/L
CHLORIDE SERPL-SCNC: 98 MMOL/L
CO2 SERPL-SCNC: 17 MMOL/L
CO2 SERPL-SCNC: 26 MMOL/L
COLOR: NORMAL
CREAT SERPL-MCNC: 0.65 MG/DL
CREAT SERPL-MCNC: 0.69 MG/DL
DSDNA AB SER-ACNC: <12 IU/ML
DSDNA AB SER-ACNC: <12 IU/ML
EOSINOPHIL # BLD AUTO: 0.5 K/UL
EOSINOPHIL NFR BLD AUTO: 8.2 %
ERYTHROCYTE [SEDIMENTATION RATE] IN BLOOD BY WESTERGREN METHOD: 72 MM/HR
GGT SERPL-CCNC: 161 U/L
GGT SERPL-CCNC: 174 U/L
GLUCOSE QUALITATIVE U: NEGATIVE
GLUCOSE SERPL-MCNC: 46 MG/DL
GLUCOSE SERPL-MCNC: 88 MG/DL
HCT VFR BLD CALC: 39.1 %
HGB BLD-MCNC: 12 G/DL
IMM GRANULOCYTES NFR BLD AUTO: 0.3 %
KETONES URINE: NEGATIVE
LEUKOCYTE ESTERASE URINE: NEGATIVE
LYMPHOCYTES # BLD AUTO: 2.07 K/UL
LYMPHOCYTES NFR BLD AUTO: 34 %
MAN DIFF?: NORMAL
MCHC RBC-ENTMCNC: 27.1 PG
MCHC RBC-ENTMCNC: 30.7 GM/DL
MCV RBC AUTO: 88.5 FL
MONOCYTES # BLD AUTO: 0.39 K/UL
MONOCYTES NFR BLD AUTO: 6.4 %
NEUTROPHILS # BLD AUTO: 3.04 K/UL
NEUTROPHILS NFR BLD AUTO: 50.1 %
NITRITE URINE: NEGATIVE
PH URINE: 5.5
PLATELET # BLD AUTO: 283 K/UL
POTASSIUM SERPL-SCNC: 4.4 MMOL/L
POTASSIUM SERPL-SCNC: 4.5 MMOL/L
PROT SERPL-MCNC: 7.2 G/DL
PROT SERPL-MCNC: 7.2 G/DL
PROTEIN URINE: NEGATIVE
RBC # BLD: 4.42 M/UL
RBC # FLD: 13.3 %
SODIUM SERPL-SCNC: 141 MMOL/L
SODIUM SERPL-SCNC: 141 MMOL/L
SPECIFIC GRAVITY URINE: 1.01
UROBILINOGEN URINE: NORMAL
WBC # FLD AUTO: 6.08 K/UL

## 2021-12-06 ENCOUNTER — APPOINTMENT (OUTPATIENT)
Dept: HEPATOLOGY | Facility: CLINIC | Age: 51
End: 2021-12-06
Payer: COMMERCIAL

## 2021-12-06 VITALS
WEIGHT: 237 LBS | SYSTOLIC BLOOD PRESSURE: 148 MMHG | BODY MASS INDEX: 41.99 KG/M2 | OXYGEN SATURATION: 97 % | DIASTOLIC BLOOD PRESSURE: 90 MMHG | RESPIRATION RATE: 16 BRPM | HEART RATE: 80 BPM | HEIGHT: 63 IN | TEMPERATURE: 97.7 F

## 2021-12-06 PROCEDURE — 99214 OFFICE O/P EST MOD 30 MIN: CPT

## 2021-12-06 PROCEDURE — 99204 OFFICE O/P NEW MOD 45 MIN: CPT

## 2021-12-06 RX ORDER — AMMONIUM LACTATE 12 %
12 CREAM (GRAM) TOPICAL TWICE DAILY
Qty: 1 | Refills: 1 | Status: DISCONTINUED | COMMUNITY
Start: 2021-02-08 | End: 2021-12-06

## 2021-12-06 RX ORDER — LEVONORGESTREL/ETHIN.ESTRADIOL 90-20 MCG
90-20 TABLET ORAL
Refills: 0 | Status: DISCONTINUED | COMMUNITY
End: 2021-12-06

## 2021-12-06 NOTE — CONSULT LETTER
[Dear  ___] : Dear  [unfilled], [Consult Letter:] : I had the pleasure of evaluating your patient, [unfilled]. [( Thank you for referring [unfilled] for consultation for _____ )] : Thank you for referring [unfilled] for consultation for [unfilled] [Please see my note below.] : Please see my note below. [Consult Closing:] : Thank you very much for allowing me to participate in the care of this patient.  If you have any questions, please do not hesitate to contact me. [Sincerely,] : Sincerely, [FreeTextEntry2] : Elisa Bliss [FreeTextEntry3] : Dr. Irwin Bar MD\par  of Medicine\par [facility]\par [facility] / [facility]\par

## 2021-12-06 NOTE — ASSESSMENT
[FreeTextEntry1] : 51y/o F PMH lupus, morbid obesity referred for RUQ US on 10/7/21 revealing fatty liver, hepatomegaly, and hypoechoic liver lesion in R lobe, intermittently elevated liver enzymes and elevated ALP. 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 I have also advised Ms. RAMOS on avoidance of hepatotoxic agents and alcohol.\par \par blood tests today\par percutaneous biopsy of the liver biopsy ordered\par RTC 1 month w/ fibroscan

## 2021-12-06 NOTE — HISTORY OF PRESENT ILLNESS
[de-identified] : 49y/o F PMH lupus (diagnosed 2013), morbid obesity, endometriosis, s/p cholecystectomy, s/p oophorectomy referred for RUQ US on 10/7/21 revealing fatty liver, hepatomegaly, and hypoechoic liver lesion in R lobe 1.2 x 1.8 x 1.6cm. MRI 10/18/21 revealed 2 lesions (1.6x1.3cm, 1.1x1cm), both likely focal nodular hyperplasia. Pt with h/o intermittently elevated liver enzymes and elevated ALP.\par \par Was on MTX for SLE, which was stopped in 8/2021 (was taking for <1 year). Previously was on Cellcept (3/2020) which didn't help with her lupus. Also was on a trial drug at [facility] x 3 months which helped bring down ESR. Symptoms include joint pain, facial rashes, lower extremity welts, and hair loss. \par \par Stopped taking Lybrel (levonorgesterel-ethinyl estrad) in 6/2021.\par \par Social hx: nonsmoker, social etoh use (1x /month; 1 glass of wine), no drugs\par Family hx: no liver disease; hereditary arterial sclerosis (paternal side of family), thyroid disease (grandmother and mother)\par \par 11/15/21 BW- AST 29, ALT 26, , , plt 283

## 2021-12-06 NOTE — PHYSICAL EXAM
[General Appearance - Alert] : alert [General Appearance - In No Acute Distress] : in no acute distress [Sclera] : the sclera and conjunctiva were normal [Outer Ear] : the ears and nose were normal in appearance [Neck Appearance] : the appearance of the neck was normal [] : no respiratory distress [Respiration, Rhythm And Depth] : normal respiratory rhythm and effort [Apical Impulse] : the apical impulse was normal [Abnormal Walk] : normal gait [Musculoskeletal - Swelling] : no joint swelling seen [Skin Color & Pigmentation] : normal skin color and pigmentation [Skin Turgor] : normal skin turgor [Oriented To Time, Place, And Person] : oriented to person, place, and time [Impaired Insight] : insight and judgment were intact [Scleral Icterus] : No Scleral Icterus [Spider Angioma] : No spider angioma(s) were observed [Abdominal  Ascites] : no ascites [Ascites Fluid Wave] : no ascites fluid wave [Ascites Tense] : ascites is not tense [Asterixis] : no asterixis observed [Jaundice] : No jaundice [Depression] : no depression [Hallucinations] : ~T no ~M hallucinations [Bowel Sounds] : normal bowel sounds [Abdomen Soft] : soft [No Focal Deficits] : no focal deficits

## 2021-12-07 LAB
ACE BLD-CCNC: 46 U/L
AFP-TM SERPL-MCNC: 3.8 NG/ML
ALBUMIN SERPL ELPH-MCNC: 4.6 G/DL
ALP BLD-CCNC: 308 U/L
ALT SERPL-CCNC: 33 U/L
ANION GAP SERPL CALC-SCNC: 13 MMOL/L
AST SERPL-CCNC: 34 U/L
BASOPHILS # BLD AUTO: 0.05 K/UL
BASOPHILS NFR BLD AUTO: 0.9 %
BILIRUB SERPL-MCNC: 0.2 MG/DL
BUN SERPL-MCNC: 14 MG/DL
CALCIUM SERPL-MCNC: 9.6 MG/DL
CHLORIDE SERPL-SCNC: 102 MMOL/L
CO2 SERPL-SCNC: 24 MMOL/L
COVID-19 SPIKE DOMAIN ANTIBODY INTERPRETATION: POSITIVE
CREAT SERPL-MCNC: 0.62 MG/DL
DEPRECATED KAPPA LC FREE/LAMBDA SER: 1.35 RATIO
EOSINOPHIL # BLD AUTO: 0.39 K/UL
EOSINOPHIL NFR BLD AUTO: 6.7 %
FERRITIN SERPL-MCNC: 44 NG/ML
GGT SERPL-CCNC: 177 U/L
GLUCOSE SERPL-MCNC: 100 MG/DL
HBV CORE IGG+IGM SER QL: NONREACTIVE
HBV SURFACE AB SER QL: NONREACTIVE
HBV SURFACE AG SER QL: NONREACTIVE
HCT VFR BLD CALC: 38.9 %
HCV AB SER QL: NONREACTIVE
HCV S/CO RATIO: 0.08 S/CO
HEPATITIS A IGG ANTIBODY: NONREACTIVE
HGB BLD-MCNC: 12 G/DL
IGA SER QL IEP: 329 MG/DL
IGG SER QL IEP: 1308 MG/DL
IGM SER QL IEP: 97 MG/DL
IMM GRANULOCYTES NFR BLD AUTO: 0.3 %
INR PPP: 0.97 RATIO
KAPPA LC CSF-MCNC: 1.72 MG/DL
KAPPA LC SERPL-MCNC: 2.33 MG/DL
LYMPHOCYTES # BLD AUTO: 1.76 K/UL
LYMPHOCYTES NFR BLD AUTO: 30.3 %
MAN DIFF?: NORMAL
MCHC RBC-ENTMCNC: 27.1 PG
MCHC RBC-ENTMCNC: 30.8 GM/DL
MCV RBC AUTO: 87.8 FL
MONOCYTES # BLD AUTO: 0.47 K/UL
MONOCYTES NFR BLD AUTO: 8.1 %
NEUTROPHILS # BLD AUTO: 3.12 K/UL
NEUTROPHILS NFR BLD AUTO: 53.7 %
PLATELET # BLD AUTO: 283 K/UL
POTASSIUM SERPL-SCNC: 4.3 MMOL/L
PROT SERPL-MCNC: 7.3 G/DL
PT BLD: 11.6 SEC
RBC # BLD: 4.43 M/UL
RBC # FLD: 14.2 %
SARS-COV-2 AB SERPL IA-ACNC: >250 U/ML
SODIUM SERPL-SCNC: 139 MMOL/L
T PALLIDUM AB SER QL IA: NEGATIVE
WBC # FLD AUTO: 5.81 K/UL

## 2021-12-08 LAB
IGG4 SER-MCNC: 28 MG/DL
MITOCHONDRIA AB SER IF-ACNC: NORMAL

## 2021-12-13 ENCOUNTER — APPOINTMENT (OUTPATIENT)
Dept: RHEUMATOLOGY | Facility: CLINIC | Age: 51
End: 2021-12-13

## 2021-12-13 VITALS
BODY MASS INDEX: 41.86 KG/M2 | SYSTOLIC BLOOD PRESSURE: 148 MMHG | DIASTOLIC BLOOD PRESSURE: 84 MMHG | RESPIRATION RATE: 16 BRPM | OXYGEN SATURATION: 100 % | HEIGHT: 63 IN | WEIGHT: 236.25 LBS | HEART RATE: 67 BPM | TEMPERATURE: 98.6 F

## 2021-12-13 NOTE — HISTORY OF PRESENT ILLNESS
[FreeTextEntry1] : 51 year old female with SLE diagnosed 2016 with features that include malar rash, oral ulcers, arthritis, pericarditis, DNA+, alopecia, fever.  Of note, she is ROSE-.  Her serologies include - Sm/RNP/Ro/La/aPL; she has never been hypocomplementemic.  Her ESR has been persistently elevated. She has had a skin biopsy with interface dermatitis; her most pressing complaints have been of joint pain and stiffness, malar rash, fatigue.\par \par Febrary 8, 2021\par f/u SLE, on Methotrexate 12.5mg x4 weeks.  She continues on plaquenil and colchicine; MMF was tapered over 3 weeks since her last visit on January 12.  She notes marked improvement and has no pain.  Her previous stiffness has lessoned to ~1 hour (and there have been recent days when she had no stiffness).  She additionally feels markedly more energetic.  There is increased alopecia (?medication related), no rash, nausea, vomiting, abdominal pain, edema, ulcers, lymphadenopathy, fever, anorexia, weight loss, chest pain, shortness of breath, nausea, vomiting, edema, anosmia or ageusia.  She had a UTI the week of Jan 25--received antibiotics from pcp; also with tooth extraction 2/3 (and given amox for 5 days). She notes dryness of her skin (particularly of her hands)\par \par April 19, 2019  PtGA 1.1\par f/u SLE; continues to feel stiff over this past month with occasional swelling of left ankle. However, feels that she may be slightly improved compared to last visit. Methotrexate increased last visit from 12.5 to 15mg/week. Has had nausea following methotrexate on one of her recent doses. Notes that her dip's have developed swelling and cysts (which was exacerbated post-vaccine). Of note,she has a family history of hand oa in females. States that hair continues to be thin. No rash, alopecia, fever, anorexia, weight loss, lymphadenopathy, fatigue, dry eyes, chest pain, shortness of breath, cough, anosmia, ageusia, nausea, vomiting, diarrhea, abdominal pain. She will be visiting her sons in [place] for a college graduation, both sons have received the first dose of the Moderna vaccine.\par \par May 17, 2021  PtGA 1.2\par f/u SLE, feels well--has been taking increased MTX (17.5) for 3 weeks.  Feels fatigue the following day.  Doing well until a few days ago when she notes some facial erythema and some arthralgia of ankles and elbows.  No am stiffness or joint swelling. Noted an oral ulcer on palatte ~2 weeks ago. Went to [place] for sons' graduation. Otherwise, no fever, other rash, headache, anorexia, weight loss, alopecia (hair remains thin), lymphadenopathy, chills, jdry eyes, chest pain, shortness of breath, cough, anosmia, ageusia, nausea, vomiting, diarrhea, abdominal pain.  Of note, ESR decrease to 43 last visit.\par \par June 28, 2021 PtGA 0.3\par f/u SLE--feeling very well on MTX 17.5.  She has tolerated the dose without nausea for the last 2 weeks by holding the colchicine and plaquenil on the day she takes her methotrexae. She was taken off her oral contraeptive by gyn ~4 weeks ago--she had one break-through bleeding (followed by a UTI) for which she received macrobid x 4 days.  Her joints have been "really good" with resolution off tenderness of  right 3rd MCP and elbow; but persistent (but less) mild pain of ankles, there has been no swelling, warmth or stiffness.  She has noticed erythema of her face (she has not used any steroid cream)--the previously seen exematous rash on dorsum of bilateral hands has cleared with steroid cream application.  No fever, rash, anorexia, weight loss, alopecia, lymphadenopathy, chills, oral ulcers (previous oral ulcer has resolved), fatigue, dry eyes, chest pain, shortness of breath, cough, anosmia, ageusia, nausea, vomiting, diarrhea, abdominal pain. \par \par Aug 9, 2021 PtGA 2.0\par f/u SLE, was doing well until 2 weeks ago when she noted onset of stiffness and pain in bilateral wrists and shoulders.  She did not have associated swelling in these joints, nor in her ankles (previously affected).  She states that she has been experiencing many hot flashes since the discontinuation of her estrogen and is sleeping poorly.  Her previous fatigue has returned.  Alopecia continues but no rash (she has used steroid cream intermittently, fever, anorexia, weight loss, lymphadenopathy, chills, oral ulcers, dry eyes, chest pain, shortness of breath, cough, anosmia, ageusia, nausea, vomiting, diarrhea, abdominal pain. She notes that she is fatigued most of Sunday (she takes her methotrexate on Saturday night).\par \par Sept 20, 2021 PtGA 1.8\par f/u SLE; doing well with marked improvement of joint pains, stiffness, alopecia.  No longer with paresthesias down her legs.She still has pains in joints affected by OA. Still with fatigue. Her labs showed increased liver enzymes including GGT; her ALK phos was fractionated with a "normal" percentage of bone and liver.  Her last methotrexate dose was 8/7.  She is additionally taking supplements (including collagen, magnesium, tumeric) and has been meditating.  She received her third dose of the covid vaccine this month (with fatigue and achyness).  She has not been using flexeril (it has made her feel groggy upon awakening)\par \par October 22, 2021  PtGA 1.0\par f/u SLE continuing off methotrexate, with minimal signs/symptoms of "active SLE", i.e. no rash, alopecia (hair remains thin but is not falling out), joint pains/swelling, although she is beginning to note the onset of mild stiffness since off of methotrexate.  Fatigue is about the same (Still with hot flashes have resulted in poor sleep)..  No jaudice, fever, rash, anorexia, weight loss, lymphadenopathy, chills, oral ulcers, dry eyes, chest pain, shortness of breath, cough, anosmia, ageusia, nausea, vomiting, diarrhea, abdominal pain. Since last seen, she has had an abdominal ultrasound showing a fatty liver as well as a hypodense non-vascular intrahepatic structure with recommendation for a MRI with and without contrast.  The MRI showed multiple subcentimeter hepatic cysts as well as a 1.6x1.3 lesion, most likely a focal nodular hyperplasia and a second similarly appearing lesion in the subcapsular region of the right hepatic lobe.  I discussed these findings with the radiologist who recommended monitoring in 3-6 months.  The transaminases have returned to normal, her alk phos is lower and her ESR remains elevated.doing well (and now off methotrexate). \par \par November 15, 2021 PtGA 1.4\par f/u SLE--she saw her pcp last week (Nov 8) and had blood obtained for HgA1c, lipids, thyroid as well as cbc and chem.  She had a painful right occipital lymphnode (last week) which is now not tender and ?diminishing.  The AST from the PCP's labs is minimally elevated, and her ALK phos is greater than previously.  She has no abdominal symptoms.  She has no joint pain or swelling but stiffness is persisting; she additionally notes a small palatal ulcer for ~1 week.  No fever, rash, anorexia, unintentional weight loss (she is dieting and losing weight slowly), alopecia, chills, nasal ulcers, dry eyes, chest pain, shortness of breath, cough, anosmia, ageusia, nausea, vomiting, diarrhea, abdominal pain. She has an appointment with Dr Bar (hepatology) on Dec 6.\par  \par Dec 13, 2021  PtGA  2.2\par F/u SLE--notes increased stiffness in small joints of her hands, additionally with right trochanteric bursa tenderness with difficulty lieing on her right side and with pain extending down lateral leg. The palatal ulcer and occipital (rt) lymph node seen last month have resolved. Otherwise, her symptoms are unchanged without fever, rash, anorexia, weight loss, alopecia, lymphadenopathy, chills, joint pain/swelling/stiffness, oral ulcers, fatigue, chest pain, shortness of breath, cough, nausea, vomiting, diarrhea, abdominal pain. She saw Dr Bar (hepatoloty) last week who has initiated a work-up which includes a liver biopsy (scheduled on 12/28) as well as MRI focusing on the biliary ducts (?PBC).  Of note, he raises the possibility that PBC may be related to her previous long term use of estrogen.  She additionally adds that she was asked if she has pruritus and said no, but that she responded "incorrectly" as she has mild pruritus of her lower extremities at [facility].\par \par \par Meds: Plaquenil 400mg (last optho ?6m ago), colchicine .6mg bid, folic acid 1mg\par \par PE:  148/84  236\par + right trochanteric bursa tenderness; Skin: no malar erythema a; hair thin (but unchanged), no synovitis or tenderness of joints including wrists, shoulders, elbows, MCPs, PIPs and ankles; no nasal/oral ulcerations, lymphadenopathy, periungal erythema, or edema.  Lungs: Clear, Cor: RRR w/o m/r/g, Abd: soft, NT, BS+, Ext: no CCE, Neuro-gait normal\par \par Imp\par SLE --mild clinical musculoskeletal stiffness/pain but without swlling; her trochanteric bursitis is not related to her SLE (and have advised that she use capsaicin cream).  Her hepatic work-up is in place with a ?PBC.  Of note, she is historically anti-Ro negative (which can be associated with SjS and PBC).  As she had blood drawn last week, will only obtain serologies and ESR.  Will continue plaquenil and colchicine.  \par COVID-Pt is s/p vaccination (full) with high antibody titers.   s/p booster\par HM:  She received this season's influenza vaccine

## 2021-12-28 ENCOUNTER — APPOINTMENT (OUTPATIENT)
Dept: ULTRASOUND IMAGING | Facility: IMAGING CENTER | Age: 51
End: 2021-12-28
Payer: COMMERCIAL

## 2021-12-28 ENCOUNTER — RESULT REVIEW (OUTPATIENT)
Age: 51
End: 2021-12-28

## 2021-12-28 ENCOUNTER — OUTPATIENT (OUTPATIENT)
Dept: OUTPATIENT SERVICES | Facility: HOSPITAL | Age: 51
LOS: 1 days | End: 2021-12-28
Payer: COMMERCIAL

## 2021-12-28 DIAGNOSIS — R74.8 ABNORMAL LEVELS OF OTHER SERUM ENZYMES: ICD-10-CM

## 2021-12-28 PROCEDURE — 47000 NEEDLE BIOPSY OF LIVER PERQ: CPT

## 2021-12-28 PROCEDURE — 88307 TISSUE EXAM BY PATHOLOGIST: CPT

## 2021-12-28 PROCEDURE — 88313 SPECIAL STAINS GROUP 2: CPT

## 2021-12-28 PROCEDURE — 88307 TISSUE EXAM BY PATHOLOGIST: CPT | Mod: 26

## 2021-12-28 PROCEDURE — 88313 SPECIAL STAINS GROUP 2: CPT | Mod: 26

## 2021-12-28 PROCEDURE — 76942 ECHO GUIDE FOR BIOPSY: CPT

## 2021-12-28 PROCEDURE — 76942 ECHO GUIDE FOR BIOPSY: CPT | Mod: 26

## 2021-12-29 LAB
ALBUPE: 15.4 %
ALPHA1UPE: 30.6 %
ALPHA2UPE: 22.4 %
APPEARANCE: CLEAR
BETAUPE: 12.8 %
BILIRUBIN URINE: NEGATIVE
BLOOD URINE: NEGATIVE
C3 SERPL-MCNC: 185 MG/DL
C4 SERPL-MCNC: 27 MG/DL
COLOR: COLORLESS
DSDNA AB SER-ACNC: <12 IU/ML
ERYTHROCYTE [SEDIMENTATION RATE] IN BLOOD BY WESTERGREN METHOD: 70 MM/HR
GAMMAUPE: 18.8 %
GLUCOSE QUALITATIVE U: NEGATIVE
IGA 24H UR QL IFE: NORMAL
KAPPA LC 24H UR QL: NORMAL
KETONES URINE: NEGATIVE
LEUKOCYTE ESTERASE URINE: NEGATIVE
M PROTEIN SPEC IFE-MCNC: NORMAL
NITRITE URINE: NEGATIVE
PH URINE: 6.5
PROT PATTERN 24H UR ELPH-IMP: NORMAL
PROT UR-MCNC: 5 MG/DL
PROT UR-MCNC: 5 MG/DL
PROTEIN URINE: NEGATIVE
SPECIFIC GRAVITY URINE: 1
UROBILINOGEN URINE: NORMAL

## 2022-01-05 ENCOUNTER — OUTPATIENT (OUTPATIENT)
Dept: OUTPATIENT SERVICES | Facility: HOSPITAL | Age: 52
LOS: 1 days | End: 2022-01-05
Payer: COMMERCIAL

## 2022-01-05 ENCOUNTER — APPOINTMENT (OUTPATIENT)
Dept: MRI IMAGING | Facility: IMAGING CENTER | Age: 52
End: 2022-01-05
Payer: COMMERCIAL

## 2022-01-05 DIAGNOSIS — Z00.8 ENCOUNTER FOR OTHER GENERAL EXAMINATION: ICD-10-CM

## 2022-01-05 PROCEDURE — 74183 MRI ABD W/O CNTR FLWD CNTR: CPT | Mod: 26

## 2022-01-05 PROCEDURE — A9585: CPT

## 2022-01-05 PROCEDURE — 74183 MRI ABD W/O CNTR FLWD CNTR: CPT

## 2022-01-07 LAB — SURGICAL PATHOLOGY STUDY: SIGNIFICANT CHANGE UP

## 2022-01-11 ENCOUNTER — APPOINTMENT (OUTPATIENT)
Dept: HEPATOLOGY | Facility: CLINIC | Age: 52
End: 2022-01-11
Payer: COMMERCIAL

## 2022-01-11 VITALS
TEMPERATURE: 98.6 F | HEART RATE: 80 BPM | OXYGEN SATURATION: 100 % | SYSTOLIC BLOOD PRESSURE: 108 MMHG | BODY MASS INDEX: 41.11 KG/M2 | HEIGHT: 63 IN | RESPIRATION RATE: 16 BRPM | DIASTOLIC BLOOD PRESSURE: 71 MMHG | WEIGHT: 232 LBS

## 2022-01-11 PROCEDURE — 91200 LIVER ELASTOGRAPHY: CPT

## 2022-01-11 PROCEDURE — 99214 OFFICE O/P EST MOD 30 MIN: CPT

## 2022-01-11 NOTE — HISTORY OF PRESENT ILLNESS
[de-identified] : 50 y/o F w/ hx of lupus (diagnosed 2013), morbid obesity, endometriosis, s/p cholecystectomy, s/p oophorectomy referred for RUQ US on 10/7/21 revealing fatty liver, hepatomegaly, and hypoechoic liver lesion in R lobe 1.2 x 1.8 x 1.6cm. MRI 10/18/21 revealed 2 lesions (1.6x1.3cm, 1.1x1cm), both likely focal nodular hyperplasia. Pt with h/o intermittently elevated liver enzymes and elevated ALP.\par \par Was on MTX for SLE, which was stopped in 8/2021 (was taking for <1 year). Previously was on Cellcept (3/2020) which didn't help with her lupus. Also was on a trial drug at [facility] x 3 months which helped bring down ESR. Symptoms include joint pain, facial rashes, lower extremity welts, and hair loss. \par \par Stopped taking Lybrel (levonorgesterel-ethinyl estrad) in 6/2021.\par \par Social hx: nonsmoker, social etoh use (1x /month; 1 glass of wine), no drugs\par Family hx: no liver disease; hereditary arterial sclerosis (paternal side of family), thyroid disease (grandmother and mother)\par \par 11/15/21 BW- AST 29, ALT 26, , , plt 283\par 1/11/22 - weight stable. she says she has been on colchicine for years for joint pains. complaining of ankle pain which limits mobility and exercising. MRI/MRCP shows liver lesions about the same size.

## 2022-01-11 NOTE — ASSESSMENT
[FreeTextEntry1] : 52 y/o F w/ hx of lupus (diagnosed 2013), morbid obesity, endometriosis, s/p cholecystectomy, s/p oophorectomy referred for RUQ US on 10/7/21 revealing fatty liver, hepatomegaly, and hypoechoic liver lesion in R lobe 1.2 x 1.8 x 1.6cm. MRI 10/18/21 revealed 2 lesions (1.6x1.3cm, 1.1x1cm), both likely focal nodular hyperplasia. Pt with h/o intermittently elevated liver enzymes and elevated ALP.\par \par # Elevated ALP, liver biopsy 12/28/21 c/w DILI + 15-20% macrosteatosis.\par Possible drug culprit includes Lybrel (She was on it for >15 years for endometriosis and stopped it 6/1/21) vs MTX (she was on it for >1 year and stopped 8/2021) vs colchicine (started 2014 and continues to take it). \par MRCP 1/2022 shows no biliary pathology.\par Liver biopsy 12/28/21 does not show findings c/w PBC. Her AMA is negative. \par ALP appears to have plateau after increasing earlier in 2021\par Recheck labs now\par Rx Ursodiol 500 mg po TID (dosed 15 mg/kg) to attempt to speed up improvement\par May need to consider stopping colchicine if ALP continues to rise although I do not think that is the culprit lesion\par \par # 2 small FNH, stable in size from 10/2021 to smaller 1/2022\par Asymptomatic\par Not likely related to OCP\par nothing to do as these are benign lesions\par consider repeat MRI in 6-12 months\par \par # Hx Elevated AST/ALT\par Suspect degree of ROMERO as well \par recommended GLP-1 agonist Wegovy, she will think about it\par Fibroscan 1/11/22 - , 5.4 kPa c/w F0.\par \par I have advised the patient on maintaining a healthy lifestyle which includes keeping a healthy diet (Mediterranean diet is preferred), calorie reduction of 30%, and regular exercise of at least 150 minutes a week to improve Her fatty liver disease. I have also advised Ms. RAMOS on avoidance of hepatotoxic agents and alcohol.\par \par RTC 3 months

## 2022-01-11 NOTE — CONSULT LETTER
[Dear  ___] : Dear  [unfilled], [Consult Letter:] : I had the pleasure of evaluating your patient, [unfilled]. [( Thank you for referring [unfilled] for consultation for _____ )] : Thank you for referring [unfilled] for consultation for [unfilled] [Please see my note below.] : Please see my note below. [Consult Closing:] : Thank you very much for allowing me to participate in the care of this patient.  If you have any questions, please do not hesitate to contact me. [Sincerely,] : Sincerely, [FreeTextEntry2] : Elisa Bliss [FreeTextEntry3] : Dr. Irwin Bar MD\par  of Medicine\par [facility]\par [facility] / [facility]\par

## 2022-01-11 NOTE — PHYSICAL EXAM
[General Appearance - Alert] : alert [General Appearance - In No Acute Distress] : in no acute distress [Sclera] : the sclera and conjunctiva were normal [Outer Ear] : the ears and nose were normal in appearance [Neck Appearance] : the appearance of the neck was normal [] : no respiratory distress [Respiration, Rhythm And Depth] : normal respiratory rhythm and effort [Apical Impulse] : the apical impulse was normal [Bowel Sounds] : normal bowel sounds [Abdomen Soft] : soft [Abnormal Walk] : normal gait [Musculoskeletal - Swelling] : no joint swelling seen [Skin Color & Pigmentation] : normal skin color and pigmentation [Skin Turgor] : normal skin turgor [No Focal Deficits] : no focal deficits [Oriented To Time, Place, And Person] : oriented to person, place, and time [Impaired Insight] : insight and judgment were intact [Scleral Icterus] : No Scleral Icterus [Spider Angioma] : No spider angioma(s) were observed [Abdominal  Ascites] : no ascites [Ascites Fluid Wave] : no ascites fluid wave [Ascites Tense] : ascites is not tense [Asterixis] : no asterixis observed [Jaundice] : No jaundice [Depression] : no depression [Hallucinations] : ~T no ~M hallucinations

## 2022-01-12 LAB
ALBUMIN SERPL ELPH-MCNC: 4.5 G/DL
ALP BLD-CCNC: 292 U/L
ALT SERPL-CCNC: 35 U/L
ANION GAP SERPL CALC-SCNC: 13 MMOL/L
AST SERPL-CCNC: 37 U/L
BASOPHILS # BLD AUTO: 0.07 K/UL
BASOPHILS NFR BLD AUTO: 1.2 %
BILIRUB SERPL-MCNC: 0.2 MG/DL
BUN SERPL-MCNC: 12 MG/DL
CALCIUM SERPL-MCNC: 9.6 MG/DL
CHLORIDE SERPL-SCNC: 101 MMOL/L
CO2 SERPL-SCNC: 26 MMOL/L
CREAT SERPL-MCNC: 0.7 MG/DL
CRP SERPL-MCNC: 13 MG/L
EOSINOPHIL # BLD AUTO: 0.27 K/UL
EOSINOPHIL NFR BLD AUTO: 4.5 %
ERYTHROCYTE [SEDIMENTATION RATE] IN BLOOD BY WESTERGREN METHOD: 46 MM/HR
GGT SERPL-CCNC: 164 U/L
GLUCOSE SERPL-MCNC: 94 MG/DL
HCT VFR BLD CALC: 39.9 %
HGB BLD-MCNC: 12.2 G/DL
IMM GRANULOCYTES NFR BLD AUTO: 0.3 %
INR PPP: 1.04 RATIO
LYMPHOCYTES # BLD AUTO: 2.1 K/UL
LYMPHOCYTES NFR BLD AUTO: 35.1 %
MAN DIFF?: NORMAL
MCHC RBC-ENTMCNC: 27.1 PG
MCHC RBC-ENTMCNC: 30.6 GM/DL
MCV RBC AUTO: 88.7 FL
MONOCYTES # BLD AUTO: 0.41 K/UL
MONOCYTES NFR BLD AUTO: 6.8 %
NEUTROPHILS # BLD AUTO: 3.12 K/UL
NEUTROPHILS NFR BLD AUTO: 52.1 %
PLATELET # BLD AUTO: 308 K/UL
POTASSIUM SERPL-SCNC: 4.4 MMOL/L
PROT SERPL-MCNC: 7.3 G/DL
PT BLD: 12.2 SEC
RBC # BLD: 4.5 M/UL
RBC # FLD: 14.4 %
SODIUM SERPL-SCNC: 140 MMOL/L
WBC # FLD AUTO: 5.99 K/UL

## 2022-01-13 LAB
C3 SERPL-MCNC: 177 MG/DL
C4 SERPL-MCNC: 28 MG/DL

## 2022-01-18 ENCOUNTER — TRANSCRIPTION ENCOUNTER (OUTPATIENT)
Age: 52
End: 2022-01-18

## 2022-01-19 ENCOUNTER — APPOINTMENT (OUTPATIENT)
Dept: RHEUMATOLOGY | Facility: CLINIC | Age: 52
End: 2022-01-19

## 2022-01-19 ENCOUNTER — TRANSCRIPTION ENCOUNTER (OUTPATIENT)
Age: 52
End: 2022-01-19

## 2022-01-19 VITALS
TEMPERATURE: 97.8 F | HEART RATE: 76 BPM | HEIGHT: 63 IN | DIASTOLIC BLOOD PRESSURE: 73 MMHG | BODY MASS INDEX: 41.55 KG/M2 | SYSTOLIC BLOOD PRESSURE: 129 MMHG | OXYGEN SATURATION: 98 % | RESPIRATION RATE: 16 BRPM | WEIGHT: 234.5 LBS

## 2022-01-19 NOTE — HISTORY OF PRESENT ILLNESS
[FreeTextEntry1] : 51 year old female with SLE diagnosed 2016 with features that include malar rash, oral ulcers, arthritis, pericarditis, DNA+, alopecia, fever.  Of note, she is ROSE-.  Her serologies include - Sm/RNP/Ro/La/aPL; she has never been hypocomplementemic.  Her ESR has been persistently elevated. She has had a skin biopsy with interface dermatitis; her most pressing complaints have been of joint pain and stiffness, malar rash, fatigue.  She is historically anti-Ro negative (which can be associated with SjS and PBC).\par \par \par Febrary 8, 2021\par f/u SLE, on Methotrexate 12.5mg x4 weeks.  She continues on plaquenil and colchicine; MMF was tapered over 3 weeks since her last visit on January 12.  She notes marked improvement and has no pain.  Her previous stiffness has lessoned to ~1 hour (and there have been recent days when she had no stiffness).  She additionally feels markedly more energetic.  There is increased alopecia (?medication related), no rash, nausea, vomiting, abdominal pain, edema, ulcers, lymphadenopathy, fever, anorexia, weight loss, chest pain, shortness of breath, nausea, vomiting, edema, anosmia or ageusia.  She had a UTI the week of Jan 25--received antibiotics from pcp; also with tooth extraction 2/3 (and given amox for 5 days). She notes dryness of her skin (particularly of her hands)\par \par April 19, 2019  PtGA 1.1\par f/u SLE; continues to feel stiff over this past month with occasional swelling of left ankle. However, feels that she may be slightly improved compared to last visit. Methotrexate increased last visit from 12.5 to 15mg/week. Has had nausea following methotrexate on one of her recent doses. Notes that her dip's have developed swelling and cysts (which was exacerbated post-vaccine). Of note,she has a family history of hand oa in females. States that hair continues to be thin. No rash, alopecia, fever, anorexia, weight loss, lymphadenopathy, fatigue, dry eyes, chest pain, shortness of breath, cough, anosmia, ageusia, nausea, vomiting, diarrhea, abdominal pain. She will be visiting her sons in [place] for a college graduation, both sons have received the first dose of the Moderna vaccine.\par \par May 17, 2021  PtGA 1.2\par f/u SLE, feels well--has been taking increased MTX (17.5) for 3 weeks.  Feels fatigue the following day.  Doing well until a few days ago when she notes some facial erythema and some arthralgia of ankles and elbows.  No am stiffness or joint swelling. Noted an oral ulcer on palatte ~2 weeks ago. Went to [place] for sons' graduation. Otherwise, no fever, other rash, headache, anorexia, weight loss, alopecia (hair remains thin), lymphadenopathy, chills, jdry eyes, chest pain, shortness of breath, cough, anosmia, ageusia, nausea, vomiting, diarrhea, abdominal pain.  Of note, ESR decrease to 43 last visit.\par \par June 28, 2021 PtGA 0.3\par f/u SLE--feeling very well on MTX 17.5.  She has tolerated the dose without nausea for the last 2 weeks by holding the colchicine and plaquenil on the day she takes her methotrexae. She was taken off her oral contraeptive by gyn ~4 weeks ago--she had one break-through bleeding (followed by a UTI) for which she received macrobid x 4 days.  Her joints have been "really good" with resolution off tenderness of  right 3rd MCP and elbow; but persistent (but less) mild pain of ankles, there has been no swelling, warmth or stiffness.  She has noticed erythema of her face (she has not used any steroid cream)--the previously seen exematous rash on dorsum of bilateral hands has cleared with steroid cream application.  No fever, rash, anorexia, weight loss, alopecia, lymphadenopathy, chills, oral ulcers (previous oral ulcer has resolved), fatigue, dry eyes, chest pain, shortness of breath, cough, anosmia, ageusia, nausea, vomiting, diarrhea, abdominal pain. \par \par Aug 9, 2021 PtGA 2.0\par f/u SLE, was doing well until 2 weeks ago when she noted onset of stiffness and pain in bilateral wrists and shoulders.  She did not have associated swelling in these joints, nor in her ankles (previously affected).  She states that she has been experiencing many hot flashes since the discontinuation of her estrogen and is sleeping poorly.  Her previous fatigue has returned.  Alopecia continues but no rash (she has used steroid cream intermittently, fever, anorexia, weight loss, lymphadenopathy, chills, oral ulcers, dry eyes, chest pain, shortness of breath, cough, anosmia, ageusia, nausea, vomiting, diarrhea, abdominal pain. She notes that she is fatigued most of Sunday (she takes her methotrexate on Saturday night).\par \par Sept 20, 2021 PtGA 1.8\par f/u SLE; doing well with marked improvement of joint pains, stiffness, alopecia.  No longer with paresthesias down her legs.She still has pains in joints affected by OA. Still with fatigue. Her labs showed increased liver enzymes including GGT; her ALK phos was fractionated with a "normal" percentage of bone and liver.  Her last methotrexate dose was 8/7.  She is additionally taking supplements (including collagen, magnesium, tumeric) and has been meditating.  She received her third dose of the covid vaccine this month (with fatigue and achyness).  She has not been using flexeril (it has made her feel groggy upon awakening)\par \par October 22, 2021  PtGA 1.0\par f/u SLE continuing off methotrexate, with minimal signs/symptoms of "active SLE", i.e. no rash, alopecia (hair remains thin but is not falling out), joint pains/swelling, although she is beginning to note the onset of mild stiffness since off of methotrexate.  Fatigue is about the same (Still with hot flashes have resulted in poor sleep)..  No jaudice, fever, rash, anorexia, weight loss, lymphadenopathy, chills, oral ulcers, dry eyes, chest pain, shortness of breath, cough, anosmia, ageusia, nausea, vomiting, diarrhea, abdominal pain. Since last seen, she has had an abdominal ultrasound showing a fatty liver as well as a hypodense non-vascular intrahepatic structure with recommendation for a MRI with and without contrast.  The MRI showed multiple subcentimeter hepatic cysts as well as a 1.6x1.3 lesion, most likely a focal nodular hyperplasia and a second similarly appearing lesion in the subcapsular region of the right hepatic lobe.  I discussed these findings with the radiologist who recommended monitoring in 3-6 months.  The transaminases have returned to normal, her alk phos is lower and her ESR remains elevated.doing well (and now off methotrexate). \par \par November 15, 2021 PtGA 1.4\par f/u SLE--she saw her pcp last week (Nov 8) and had blood obtained for HgA1c, lipids, thyroid as well as cbc and chem.  She had a painful right occipital lymphnode (last week) which is now not tender and ?diminishing.  The AST from the PCP's labs is minimally elevated, and her ALK phos is greater than previously.  She has no abdominal symptoms.  She has no joint pain or swelling but stiffness is persisting; she additionally notes a small palatal ulcer for ~1 week.  No fever, rash, anorexia, unintentional weight loss (she is dieting and losing weight slowly), alopecia, chills, nasal ulcers, dry eyes, chest pain, shortness of breath, cough, anosmia, ageusia, nausea, vomiting, diarrhea, abdominal pain. She has an appointment with Dr Bar (hepatology) on Dec 6.\par  \par Dec 13, 2021  PtGA  2.2\par F/u SLE--notes increased stiffness in small joints of her hands, additionally with right trochanteric bursa tenderness with difficulty lieing on her right side and with pain extending down lateral leg. The palatal ulcer and occipital (rt) lymph node seen last month have resolved. Otherwise, her symptoms are unchanged without fever, rash, anorexia, weight loss, alopecia, lymphadenopathy, chills, joint pain/swelling/stiffness, oral ulcers, fatigue, chest pain, shortness of breath, cough, nausea, vomiting, diarrhea, abdominal pain. She saw Dr Bar (hepatoloty) last week who has initiated a work-up which includes a liver biopsy (scheduled on 12/28) as well as MRI focusing on the biliary ducts (?PBC).  Of note, he raises the possibility that PBC may be related to her previous long term use of estrogen.  She additionally adds that she was asked if she has pruritus and said no, but that she responded "incorrectly" as she has mild pruritus of her lower extremities at night.\par \par Jan 19, 2022 PtGA 0.6\par f/u SLE--has had mild stiffness in her hands, otherwise without fever, rash, anorexia, weight loss, alopecia, lymphadenopathy, chills, joint pain/swelling/stiffness, oral ulcers, fatigue, dry eyes, chest pain, shortness of breath, cough, anosmia, ageusia, nausea, vomiting, diarrhea, abdominal pain. She has had a liver biopsy showing changes most consistent with a drug reaction.  Methotrexate may be a likely culprit although her long-standing birthcontrol (estrogen/progesterone) may have contributed.  Dr Bar is planning to monitor for 3 months.  She was offerred urosodiol (but has decided not to take it).  She was encouraged to lose weight. Of interest, her esr was 46 when checked last week.\par \par Meds: Plaquenil 400mg (last optho ?6m ago), colchicine .6mg bid, folic acid 1mg\par \par PE:  129/73/84  234\par  Skin: no malar erythema a; hair thin (but unchanged), no synovitis or tenderness of joints including wrists, shoulders, elbows, MCPs, PIPs and ankles; no nasal/oral ulcerations, lymphadenopathy, periungal erythema, or edema.  Lungs: Clear, Cor: RRR w/o m/r/g, Abd: soft, NT, BS+, Ext: no CCE, Neuro-gait normal\par \par Imp\par SLE --mild clinical musculoskeletal stiffness/pain but without swelling; her previous trochanteric bursitis is no longer an isue.  Her hepatic work-up is in place with a ?PBC.  Of note  As she had blood drawn last week, will not draw labs  Will continue plaquenil and colchicine.  \par COVID-Pt is s/p vaccination (full) with high antibody titers.   s/p booster\par HM:  She received this season's influenza vaccine

## 2022-03-09 ENCOUNTER — APPOINTMENT (OUTPATIENT)
Dept: RHEUMATOLOGY | Facility: CLINIC | Age: 52
End: 2022-03-09

## 2022-03-09 VITALS
BODY MASS INDEX: 41.66 KG/M2 | SYSTOLIC BLOOD PRESSURE: 128 MMHG | RESPIRATION RATE: 13 BRPM | TEMPERATURE: 97.5 F | OXYGEN SATURATION: 98 % | HEART RATE: 73 BPM | DIASTOLIC BLOOD PRESSURE: 76 MMHG | WEIGHT: 235.2 LBS

## 2022-03-09 NOTE — HISTORY OF PRESENT ILLNESS
[FreeTextEntry1] : 51 year old female with SLE diagnosed 2016 with features that include malar rash, oral ulcers, arthritis, pericarditis, DNA+, alopecia, fever.  Of note, she is ROSE-.  Her serologies include - Sm/RNP/Ro/La/aPL; she has never been hypocomplementemic.  Her ESR has been persistently elevated. She has had a skin biopsy with interface dermatitis; her most pressing complaints have been of joint pain and stiffness, malar rash, fatigue.  She is historically anti-Ro negative (which can be associated with SjS and PBC).\par \par \par Febrary 8, 2021\par f/u SLE, on Methotrexate 12.5mg x4 weeks.  She continues on plaquenil and colchicine; MMF was tapered over 3 weeks since her last visit on January 12.  She notes marked improvement and has no pain.  Her previous stiffness has lessoned to ~1 hour (and there have been recent days when she had no stiffness).  She additionally feels markedly more energetic.  There is increased alopecia (?medication related), no rash, nausea, vomiting, abdominal pain, edema, ulcers, lymphadenopathy, fever, anorexia, weight loss, chest pain, shortness of breath, nausea, vomiting, edema, anosmia or ageusia.  She had a UTI the week of Jan 25--received antibiotics from pcp; also with tooth extraction 2/3 (and given amox for 5 days). She notes dryness of her skin (particularly of her hands)\par \par April 19, 2019  PtGA 1.1\par f/u SLE; continues to feel stiff over this past month with occasional swelling of left ankle. However, feels that she may be slightly improved compared to last visit. Methotrexate increased last visit from 12.5 to 15mg/week. Has had nausea following methotrexate on one of her recent doses. Notes that her dip's have developed swelling and cysts (which was exacerbated post-vaccine). Of note,she has a family history of hand oa in females. States that hair continues to be thin. No rash, alopecia, fever, anorexia, weight loss, lymphadenopathy, fatigue, dry eyes, chest pain, shortness of breath, cough, anosmia, ageusia, nausea, vomiting, diarrhea, abdominal pain. She will be visiting her sons in [place] for a college graduation, both sons have received the first dose of the Moderna vaccine.\par \par May 17, 2021  PtGA 1.2\par f/u SLE, feels well--has been taking increased MTX (17.5) for 3 weeks.  Feels fatigue the following day.  Doing well until a few days ago when she notes some facial erythema and some arthralgia of ankles and elbows.  No am stiffness or joint swelling. Noted an oral ulcer on palatte ~2 weeks ago. Went to [place] for sons' graduation. Otherwise, no fever, other rash, headache, anorexia, weight loss, alopecia (hair remains thin), lymphadenopathy, chills, jdry eyes, chest pain, shortness of breath, cough, anosmia, ageusia, nausea, vomiting, diarrhea, abdominal pain.  Of note, ESR decrease to 43 last visit.\par \par June 28, 2021 PtGA 0.3\par f/u SLE--feeling very well on MTX 17.5.  She has tolerated the dose without nausea for the last 2 weeks by holding the colchicine and plaquenil on the day she takes her methotrexae. She was taken off her oral contraeptive by gyn ~4 weeks ago--she had one break-through bleeding (followed by a UTI) for which she received macrobid x 4 days.  Her joints have been "really good" with resolution off tenderness of  right 3rd MCP and elbow; but persistent (but less) mild pain of ankles, there has been no swelling, warmth or stiffness.  She has noticed erythema of her face (she has not used any steroid cream)--the previously seen exematous rash on dorsum of bilateral hands has cleared with steroid cream application.  No fever, rash, anorexia, weight loss, alopecia, lymphadenopathy, chills, oral ulcers (previous oral ulcer has resolved), fatigue, dry eyes, chest pain, shortness of breath, cough, anosmia, ageusia, nausea, vomiting, diarrhea, abdominal pain. \par \par Aug 9, 2021 PtGA 2.0\par f/u SLE, was doing well until 2 weeks ago when she noted onset of stiffness and pain in bilateral wrists and shoulders.  She did not have associated swelling in these joints, nor in her ankles (previously affected).  She states that she has been experiencing many hot flashes since the discontinuation of her estrogen and is sleeping poorly.  Her previous fatigue has returned.  Alopecia continues but no rash (she has used steroid cream intermittently, fever, anorexia, weight loss, lymphadenopathy, chills, oral ulcers, dry eyes, chest pain, shortness of breath, cough, anosmia, ageusia, nausea, vomiting, diarrhea, abdominal pain. She notes that she is fatigued most of Sunday (she takes her methotrexate on Saturday night).\par \par Sept 20, 2021 PtGA 1.8\par f/u SLE; doing well with marked improvement of joint pains, stiffness, alopecia.  No longer with paresthesias down her legs.She still has pains in joints affected by OA. Still with fatigue. Her labs showed increased liver enzymes including GGT; her ALK phos was fractionated with a "normal" percentage of bone and liver.  Her last methotrexate dose was 8/7.  She is additionally taking supplements (including collagen, magnesium, tumeric) and has been meditating.  She received her third dose of the covid vaccine this month (with fatigue and achyness).  She has not been using flexeril (it has made her feel groggy upon awakening)\par \par October 22, 2021  PtGA 1.0\par f/u SLE continuing off methotrexate, with minimal signs/symptoms of "active SLE", i.e. no rash, alopecia (hair remains thin but is not falling out), joint pains/swelling, although she is beginning to note the onset of mild stiffness since off of methotrexate.  Fatigue is about the same (Still with hot flashes have resulted in poor sleep)..  No jaudice, fever, rash, anorexia, weight loss, lymphadenopathy, chills, oral ulcers, dry eyes, chest pain, shortness of breath, cough, anosmia, ageusia, nausea, vomiting, diarrhea, abdominal pain. Since last seen, she has had an abdominal ultrasound showing a fatty liver as well as a hypodense non-vascular intrahepatic structure with recommendation for a MRI with and without contrast.  The MRI showed multiple subcentimeter hepatic cysts as well as a 1.6x1.3 lesion, most likely a focal nodular hyperplasia and a second similarly appearing lesion in the subcapsular region of the right hepatic lobe.  I discussed these findings with the radiologist who recommended monitoring in 3-6 months.  The transaminases have returned to normal, her alk phos is lower and her ESR remains elevated.doing well (and now off methotrexate). \par \par November 15, 2021 PtGA 1.4\par f/u SLE--she saw her pcp last week (Nov 8) and had blood obtained for HgA1c, lipids, thyroid as well as cbc and chem.  She had a painful right occipital lymphnode (last week) which is now not tender and ?diminishing.  The AST from the PCP's labs is minimally elevated, and her ALK phos is greater than previously.  She has no abdominal symptoms.  She has no joint pain or swelling but stiffness is persisting; she additionally notes a small palatal ulcer for ~1 week.  No fever, rash, anorexia, unintentional weight loss (she is dieting and losing weight slowly), alopecia, chills, nasal ulcers, dry eyes, chest pain, shortness of breath, cough, anosmia, ageusia, nausea, vomiting, diarrhea, abdominal pain. She has an appointment with Dr Bar (hepatology) on Dec 6.\par  \par Dec 13, 2021  PtGA  2.2\par F/u SLE--notes increased stiffness in small joints of her hands, additionally with right trochanteric bursa tenderness with difficulty lieing on her right side and with pain extending down lateral leg. The palatal ulcer and occipital (rt) lymph node seen last month have resolved. Otherwise, her symptoms are unchanged without fever, rash, anorexia, weight loss, alopecia, lymphadenopathy, chills, joint pain/swelling/stiffness, oral ulcers, fatigue, chest pain, shortness of breath, cough, nausea, vomiting, diarrhea, abdominal pain. She saw Dr Bar (hepatoloty) last week who has initiated a work-up which includes a liver biopsy (scheduled on 12/28) as well as MRI focusing on the biliary ducts (?PBC).  Of note, he raises the possibility that PBC may be related to her previous long term use of estrogen.  She additionally adds that she was asked if she has pruritus and said no, but that she responded "incorrectly" as she has mild pruritus of her lower extremities at night.\par \par Jan 19, 2022 PtGA 0.6\par f/u SLE--has had mild stiffness in her hands, otherwise without fever, rash, anorexia, weight loss, alopecia, lymphadenopathy, chills, joint pain/swelling/stiffness, oral ulcers, fatigue, dry eyes, chest pain, shortness of breath, cough, anosmia, ageusia, nausea, vomiting, diarrhea, abdominal pain. She has had a liver biopsy showing changes most consistent with a drug reaction.  Methotrexate may be a likely culprit although her long-standing birthcontrol (estrogen/progesterone) may have contributed.  Dr Bar is planning to monitor for 3 months.  She was offerred urosodiol (but has decided not to take it).  She was encouraged to lose weight. Of interest, her esr was 46 when checked last week.\par \par Mar 9, 2021 PtGA 1.3\par f/u SLE, continues with stiffness in her hands, lasting almost all days at times, as well as in knees and ankles (rt>left).  She notes that she has been more active as she is trying to lose weight. She has been on a collagen supplement for several months, missed a few days while travelling ~2 weeks ago and noted increased alopecia as well as the development of a malar rash.  No fever, rash, anorexia, weight loss, lymphadenopathy, chills, joint pain/swelling/stiffness, oral ulcers, fatigue, dry eyes, chest pain, shortness of breath, cough, anosmia, ageusia, nausea, vomiting, diarrhea, abdominal pain. No longer on folic acid (stopped when mtx was discontinued).\par \par Meds: Plaquenil 400mg (last optho ?8m ago), colchicine .6mg bid\par \par PE:  128/76  235\par  Skin: no malar erythema a; hair thin (but unchanged), tender right ankle but no synovitis or tenderness of other joints including wrists, shoulders, elbows, MCPs, PIPs and right ankle; no nasal/oral ulcerations, lymphadenopathy, periungal erythema, or edema.  Lungs: Clear, Cor: RRR w/o m/r/g, Abd: soft, NT, BS+, Ext: no CCE, Neuro-gait normal\par \par Imp\par SLE --mild clinical musculoskeletal stiffness/pain but without swelling;   Will check labs  Will continue plaquenil and colchicine.  \par COVID-Pt is s/p vaccination (full) with high antibody titers.   s/p booster\par Hepatic:   Her hepatic work-up is in place with ?PBC, she has an appointment with hepatology next month; will add GGT to her routine lupus labs\par HM:  She received this season's influenza vaccine

## 2022-03-09 NOTE — PHYSICAL EXAM
[General Appearance - Alert] : alert [General Appearance - Well Nourished] : well nourished [General Appearance - Well Developed] : well developed [Extraocular Movements] : extraocular movements were intact [Outer Ear] : the ears and nose were normal in appearance [Examination Of The Oral Cavity] : the lips and gums were normal [Nasal Cavity] : the nasal mucosa and septum were normal [Neck Appearance] : the appearance of the neck was normal [] : no respiratory distress [Exaggerated Use Of Accessory Muscles For Inspiration] : no accessory muscle use [Auscultation Breath Sounds / Voice Sounds] : lungs were clear to auscultation bilaterally [Heart Rate And Rhythm] : heart rate was normal and rhythm regular [Heart Sounds] : normal S1 and S2 [Heart Sounds Gallop] : no gallops [Murmurs] : no murmurs [Heart Sounds Pericardial Friction Rub] : no pericardial rub [Bowel Sounds] : normal bowel sounds [Abdomen Soft] : soft [Abdomen Tenderness] : non-tender [Cervical Lymph Nodes Enlarged Posterior Bilaterally] : posterior cervical [Cervical Lymph Nodes Enlarged Anterior Bilaterally] : anterior cervical [Supraclavicular Lymph Nodes Enlarged Bilaterally] : supraclavicular [No CVA Tenderness] : no ~M costovertebral angle tenderness [No Spinal Tenderness] : no spinal tenderness [Abnormal Walk] : normal gait [Nail Clubbing] : no clubbing  or cyanosis of the fingernails [Musculoskeletal - Swelling] : no joint swelling seen [Motor Tone] : muscle strength and tone were normal [Skin Color & Pigmentation] : normal skin color and pigmentation [FreeTextEntry1] : see hpi [No Focal Deficits] : no focal deficits

## 2022-03-11 LAB
ALBUMIN SERPL ELPH-MCNC: 4.4 G/DL
ALP BLD-CCNC: 279 U/L
ALT SERPL-CCNC: 27 U/L
ANION GAP SERPL CALC-SCNC: 14 MMOL/L
APPEARANCE: CLEAR
AST SERPL-CCNC: 28 U/L
BASOPHILS # BLD AUTO: 0.06 K/UL
BASOPHILS NFR BLD AUTO: 0.9 %
BILIRUB SERPL-MCNC: <0.2 MG/DL
BILIRUBIN URINE: NEGATIVE
BLOOD URINE: NEGATIVE
BUN SERPL-MCNC: 20 MG/DL
C3 SERPL-MCNC: 161 MG/DL
C4 SERPL-MCNC: 27 MG/DL
CALCIUM SERPL-MCNC: 9.3 MG/DL
CHLORIDE SERPL-SCNC: 102 MMOL/L
CO2 SERPL-SCNC: 26 MMOL/L
COLOR: NORMAL
CREAT SERPL-MCNC: 0.74 MG/DL
DSDNA AB SER-ACNC: <12 IU/ML
EGFR: 97 ML/MIN/1.73M2
EOSINOPHIL # BLD AUTO: 0.36 K/UL
EOSINOPHIL NFR BLD AUTO: 5.6 %
ERYTHROCYTE [SEDIMENTATION RATE] IN BLOOD BY WESTERGREN METHOD: 54 MM/HR
GGT SERPL-CCNC: 147 U/L
GLUCOSE QUALITATIVE U: NEGATIVE
GLUCOSE SERPL-MCNC: 84 MG/DL
HCT VFR BLD CALC: 39.4 %
HGB BLD-MCNC: 12.1 G/DL
IMM GRANULOCYTES NFR BLD AUTO: 0.3 %
KETONES URINE: NEGATIVE
LEUKOCYTE ESTERASE URINE: NEGATIVE
LYMPHOCYTES # BLD AUTO: 2.31 K/UL
LYMPHOCYTES NFR BLD AUTO: 35.7 %
MAN DIFF?: NORMAL
MCHC RBC-ENTMCNC: 27.7 PG
MCHC RBC-ENTMCNC: 30.7 GM/DL
MCV RBC AUTO: 90.2 FL
MONOCYTES # BLD AUTO: 0.53 K/UL
MONOCYTES NFR BLD AUTO: 8.2 %
NEUTROPHILS # BLD AUTO: 3.19 K/UL
NEUTROPHILS NFR BLD AUTO: 49.3 %
NITRITE URINE: NEGATIVE
PH URINE: 6.5
PLATELET # BLD AUTO: 297 K/UL
POTASSIUM SERPL-SCNC: 4.5 MMOL/L
PROT SERPL-MCNC: 7.2 G/DL
PROTEIN URINE: NEGATIVE
RBC # BLD: 4.37 M/UL
RBC # FLD: 14.2 %
SODIUM SERPL-SCNC: 141 MMOL/L
SPECIFIC GRAVITY URINE: 1.01
UROBILINOGEN URINE: NORMAL
WBC # FLD AUTO: 6.47 K/UL

## 2022-03-24 ENCOUNTER — TRANSCRIPTION ENCOUNTER (OUTPATIENT)
Age: 52
End: 2022-03-24

## 2022-04-04 ENCOUNTER — RX RENEWAL (OUTPATIENT)
Age: 52
End: 2022-04-04

## 2022-04-11 ENCOUNTER — APPOINTMENT (OUTPATIENT)
Dept: HEPATOLOGY | Facility: CLINIC | Age: 52
End: 2022-04-11
Payer: COMMERCIAL

## 2022-04-11 VITALS
OXYGEN SATURATION: 98 % | TEMPERATURE: 97.7 F | SYSTOLIC BLOOD PRESSURE: 128 MMHG | BODY MASS INDEX: 43.43 KG/M2 | RESPIRATION RATE: 16 BRPM | HEIGHT: 62 IN | WEIGHT: 236 LBS | HEART RATE: 69 BPM | DIASTOLIC BLOOD PRESSURE: 85 MMHG

## 2022-04-11 DIAGNOSIS — E66.01 MORBID (SEVERE) OBESITY DUE TO EXCESS CALORIES: ICD-10-CM

## 2022-04-11 PROCEDURE — 99214 OFFICE O/P EST MOD 30 MIN: CPT

## 2022-04-11 NOTE — PHYSICAL EXAM
[General Appearance - Alert] : alert [General Appearance - In No Acute Distress] : in no acute distress [Sclera] : the sclera and conjunctiva were normal [Outer Ear] : the ears and nose were normal in appearance [Neck Appearance] : the appearance of the neck was normal [Respiration, Rhythm And Depth] : normal respiratory rhythm and effort [] : no respiratory distress [Apical Impulse] : the apical impulse was normal [Bowel Sounds] : normal bowel sounds [Abdomen Soft] : soft [Abnormal Walk] : normal gait [Musculoskeletal - Swelling] : no joint swelling seen [Skin Color & Pigmentation] : normal skin color and pigmentation [Skin Turgor] : normal skin turgor [No Focal Deficits] : no focal deficits [Oriented To Time, Place, And Person] : oriented to person, place, and time [Impaired Insight] : insight and judgment were intact [Scleral Icterus] : No Scleral Icterus [Spider Angioma] : No spider angioma(s) were observed [Abdominal  Ascites] : no ascites [Ascites Fluid Wave] : no ascites fluid wave [Ascites Tense] : ascites is not tense [Asterixis] : no asterixis observed [Jaundice] : No jaundice [Depression] : no depression [Hallucinations] : ~T no ~M hallucinations

## 2022-04-11 NOTE — ASSESSMENT
[FreeTextEntry1] : 50 y/o F w/ hx of lupus (diagnosed 2013), morbid obesity, endometriosis, s/p cholecystectomy, s/p oophorectomy referred for RUQ US on 10/7/21 revealing fatty liver, hepatomegaly, and hypoechoic liver lesion in R lobe 1.2 x 1.8 x 1.6cm. MRI 10/18/21 revealed 2 lesions (1.6x1.3cm, 1.1x1cm), both likely focal nodular hyperplasia. Pt with h/o intermittently elevated liver enzymes and elevated ALP.\par \par # Elevated ALP, liver biopsy 12/28/21 c/w DILI + 15-20% macrosteatosis.\par Possible drug culprit includes Lybrel (She was on it for >15 years for endometriosis and stopped it 6/1/21) vs MTX (she was on it for >1 year and stopped 8/2021) vs colchicine (started 2014 and continues to take it). \par MRCP 1/2022 shows no biliary pathology.\par Liver biopsy 12/28/21 does not show findings c/w PBC. Her AMA is negative. \par ALP appears to have plateau after increasing earlier in 2021 but remains elevated\par I have high suspicion now that it is colchicine and have recommended her to stop it. will notify her rheumatologist Dr. Bliss\par Rx Ursodiol 500 mg po TID (dosed 15 mg/kg) again to attempt to speed up improvement (did not stop it prior)\par \par # 2 small FNH, stable in size from 10/2021 to smaller 1/2022\par Asymptomatic\par Not likely related to OCP\par nothing to do as these are benign lesions\par plan on repeat MRI 1/2023\par \par # Hx Elevated AST/ALT\par Suspect degree of ROMERO as well \par Fibroscan 1/11/22 - , 5.4 kPa c/w F0.\par start wegovy 0.25 mg SC weekly\par \par # RHM\par repeat colonoscopy in 2023\par \par I have advised the patient on maintaining a healthy lifestyle which includes keeping a healthy diet (Mediterranean diet is preferred), calorie reduction of 30%, and regular exercise of at least 150 minutes a week to improve Her fatty liver disease. I have also advised Ms. RAMOS on avoidance of hepatotoxic agents and alcohol.\par \par RTC 3 months

## 2022-04-11 NOTE — CONSULT LETTER
[Dear  ___] : Dear  [unfilled], [Consult Letter:] : I had the pleasure of evaluating your patient, [unfilled]. [( Thank you for referring [unfilled] for consultation for _____ )] : Thank you for referring [unfilled] for consultation for [unfilled] [Please see my note below.] : Please see my note below. [Consult Closing:] : Thank you very much for allowing me to participate in the care of this patient.  If you have any questions, please do not hesitate to contact me. [Sincerely,] : Sincerely, [FreeTextEntry2] : Elisa Bliss [FreeTextEntry3] : Dr. Irwin Bar MD\par  of Medicine\par [facility]\par [facility]\par

## 2022-04-11 NOTE — HISTORY OF PRESENT ILLNESS
[de-identified] : 52 y/o F w/ hx of lupus (diagnosed 2013), morbid obesity, endometriosis, s/p cholecystectomy, s/p oophorectomy referred for RUQ US on 10/7/21 revealing fatty liver, hepatomegaly, and hypoechoic liver lesion in R lobe 1.2 x 1.8 x 1.6cm. MRI 10/18/21 revealed 2 lesions (1.6x1.3cm, 1.1x1cm), both likely focal nodular hyperplasia. Pt with h/o intermittently elevated liver enzymes and elevated ALP.\par \par Was on MTX for SLE, which was stopped in 8/2021 (was taking for <1 year). Previously was on Cellcept (3/2020) which didn't help with her lupus. Also was on a trial drug at [facility] x 3 months which helped bring down ESR. Symptoms include joint pain, facial rashes, lower extremity welts, and hair loss. \par \par Stopped taking Lybrel (levonorgesterel-ethinyl estrad) in 6/2021.\par \par Social hx: nonsmoker, social etoh use (1x /month; 1 glass of wine), no drugs\par Family hx: no liver disease; hereditary arterial sclerosis (paternal side of family), thyroid disease (grandmother and mother)\par \par 11/15/21 BW- AST 29, ALT 26, , , plt 283\par 1/11/22 - weight stable. she says she has been on colchicine for years for joint pains. complaining of ankle pain which limits mobility and exercising. MRI/MRCP shows liver lesions about the same size.\par 4/11/22 visit - alk phos remains still elevated and has not changed much. she never started ursodiol. she is still on colchicine and has been on it for years for primarily joint pain.

## 2022-04-18 ENCOUNTER — NON-APPOINTMENT (OUTPATIENT)
Age: 52
End: 2022-04-18

## 2022-04-28 ENCOUNTER — NON-APPOINTMENT (OUTPATIENT)
Age: 52
End: 2022-04-28

## 2022-05-23 ENCOUNTER — LABORATORY RESULT (OUTPATIENT)
Age: 52
End: 2022-05-23

## 2022-05-23 ENCOUNTER — APPOINTMENT (OUTPATIENT)
Dept: RHEUMATOLOGY | Facility: CLINIC | Age: 52
End: 2022-05-23

## 2022-05-23 VITALS
OXYGEN SATURATION: 97 % | SYSTOLIC BLOOD PRESSURE: 119 MMHG | RESPIRATION RATE: 16 BRPM | HEIGHT: 62 IN | BODY MASS INDEX: 41.98 KG/M2 | TEMPERATURE: 97.8 F | HEART RATE: 77 BPM | DIASTOLIC BLOOD PRESSURE: 73 MMHG | WEIGHT: 228.13 LBS

## 2022-05-23 NOTE — HISTORY OF PRESENT ILLNESS
[FreeTextEntry1] : 51 year old female with SLE diagnosed 2016 with features that include malar rash, oral ulcers, arthritis, pericarditis, DNA+, alopecia, fever.  Of note, she is ROSE-.  Her serologies include - Sm/RNP/Ro/La/aPL; she has never been hypocomplementemic.  Her ESR has been persistently elevated. She has had a skin biopsy with interface dermatitis; her most pressing complaints have been of joint pain and stiffness, malar rash, fatigue.  She is historically anti-Ro negative (which can be associated with SjS and PBC).\par \par \par Febrary 8, 2021\par f/u SLE, on Methotrexate 12.5mg x4 weeks.  She continues on plaquenil and colchicine; MMF was tapered over 3 weeks since her last visit on January 12.  She notes marked improvement and has no pain.  Her previous stiffness has lessoned to ~1 hour (and there have been recent days when she had no stiffness).  She additionally feels markedly more energetic.  There is increased alopecia (?medication related), no rash, nausea, vomiting, abdominal pain, edema, ulcers, lymphadenopathy, fever, anorexia, weight loss, chest pain, shortness of breath, nausea, vomiting, edema, anosmia or ageusia.  She had a UTI the week of Jan 25--received antibiotics from pcp; also with tooth extraction 2/3 (and given amox for 5 days). She notes dryness of her skin (particularly of her hands)\par \par April 19, 2019  PtGA 1.1\par f/u SLE; continues to feel stiff over this past month with occasional swelling of left ankle. However, feels that she may be slightly improved compared to last visit. Methotrexate increased last visit from 12.5 to 15mg/week. Has had nausea following methotrexate on one of her recent doses. Notes that her dip's have developed swelling and cysts (which was exacerbated post-vaccine). Of note,she has a family history of hand oa in females. States that hair continues to be thin. No rash, alopecia, fever, anorexia, weight loss, lymphadenopathy, fatigue, dry eyes, chest pain, shortness of breath, cough, anosmia, ageusia, nausea, vomiting, diarrhea, abdominal pain. She will be visiting her sons in [place] for a college graduation, both sons have received the first dose of the Moderna vaccine.\par \par May 17, 2021  PtGA 1.2\par f/u SLE, feels well--has been taking increased MTX (17.5) for 3 weeks.  Feels fatigue the following day.  Doing well until a few days ago when she notes some facial erythema and some arthralgia of ankles and elbows.  No am stiffness or joint swelling. Noted an oral ulcer on palatte ~2 weeks ago. Went to [place] for sons' graduation. Otherwise, no fever, other rash, headache, anorexia, weight loss, alopecia (hair remains thin), lymphadenopathy, chills, jdry eyes, chest pain, shortness of breath, cough, anosmia, ageusia, nausea, vomiting, diarrhea, abdominal pain.  Of note, ESR decrease to 43 last visit.\par \par June 28, 2021 PtGA 0.3\par f/u SLE--feeling very well on MTX 17.5.  She has tolerated the dose without nausea for the last 2 weeks by holding the colchicine and plaquenil on the day she takes her methotrexae. She was taken off her oral contraeptive by gyn ~4 weeks ago--she had one break-through bleeding (followed by a UTI) for which she received macrobid x 4 days.  Her joints have been "really good" with resolution off tenderness of  right 3rd MCP and elbow; but persistent (but less) mild pain of ankles, there has been no swelling, warmth or stiffness.  She has noticed erythema of her face (she has not used any steroid cream)--the previously seen exematous rash on dorsum of bilateral hands has cleared with steroid cream application.  No fever, rash, anorexia, weight loss, alopecia, lymphadenopathy, chills, oral ulcers (previous oral ulcer has resolved), fatigue, dry eyes, chest pain, shortness of breath, cough, anosmia, ageusia, nausea, vomiting, diarrhea, abdominal pain. \par \par Aug 9, 2021 PtGA 2.0\par f/u SLE, was doing well until 2 weeks ago when she noted onset of stiffness and pain in bilateral wrists and shoulders.  She did not have associated swelling in these joints, nor in her ankles (previously affected).  She states that she has been experiencing many hot flashes since the discontinuation of her estrogen and is sleeping poorly.  Her previous fatigue has returned.  Alopecia continues but no rash (she has used steroid cream intermittently, fever, anorexia, weight loss, lymphadenopathy, chills, oral ulcers, dry eyes, chest pain, shortness of breath, cough, anosmia, ageusia, nausea, vomiting, diarrhea, abdominal pain. She notes that she is fatigued most of Sunday (she takes her methotrexate on Saturday night).\par \par Sept 20, 2021 PtGA 1.8\par f/u SLE; doing well with marked improvement of joint pains, stiffness, alopecia.  No longer with paresthesias down her legs.She still has pains in joints affected by OA. Still with fatigue. Her labs showed increased liver enzymes including GGT; her ALK phos was fractionated with a "normal" percentage of bone and liver.  Her last methotrexate dose was 8/7.  She is additionally taking supplements (including collagen, magnesium, tumeric) and has been meditating.  She received her third dose of the covid vaccine this month (with fatigue and achyness).  She has not been using flexeril (it has made her feel groggy upon awakening)\par \par October 22, 2021  PtGA 1.0\par f/u SLE continuing off methotrexate, with minimal signs/symptoms of "active SLE", i.e. no rash, alopecia (hair remains thin but is not falling out), joint pains/swelling, although she is beginning to note the onset of mild stiffness since off of methotrexate.  Fatigue is about the same (Still with hot flashes have resulted in poor sleep)..  No jaudice, fever, rash, anorexia, weight loss, lymphadenopathy, chills, oral ulcers, dry eyes, chest pain, shortness of breath, cough, anosmia, ageusia, nausea, vomiting, diarrhea, abdominal pain. Since last seen, she has had an abdominal ultrasound showing a fatty liver as well as a hypodense non-vascular intrahepatic structure with recommendation for a MRI with and without contrast.  The MRI showed multiple subcentimeter hepatic cysts as well as a 1.6x1.3 lesion, most likely a focal nodular hyperplasia and a second similarly appearing lesion in the subcapsular region of the right hepatic lobe.  I discussed these findings with the radiologist who recommended monitoring in 3-6 months.  The transaminases have returned to normal, her alk phos is lower and her ESR remains elevated.doing well (and now off methotrexate). \par \par November 15, 2021 PtGA 1.4\par f/u SLE--she saw her pcp last week (Nov 8) and had blood obtained for HgA1c, lipids, thyroid as well as cbc and chem.  She had a painful right occipital lymphnode (last week) which is now not tender and ?diminishing.  The AST from the PCP's labs is minimally elevated, and her ALK phos is greater than previously.  She has no abdominal symptoms.  She has no joint pain or swelling but stiffness is persisting; she additionally notes a small palatal ulcer for ~1 week.  No fever, rash, anorexia, unintentional weight loss (she is dieting and losing weight slowly), alopecia, chills, nasal ulcers, dry eyes, chest pain, shortness of breath, cough, anosmia, ageusia, nausea, vomiting, diarrhea, abdominal pain. She has an appointment with Dr Bar (hepatology) on Dec 6.\par  \par Dec 13, 2021  PtGA  2.2\par F/u SLE--notes increased stiffness in small joints of her hands, additionally with right trochanteric bursa tenderness with difficulty lieing on her right side and with pain extending down lateral leg. The palatal ulcer and occipital (rt) lymph node seen last month have resolved. Otherwise, her symptoms are unchanged without fever, rash, anorexia, weight loss, alopecia, lymphadenopathy, chills, joint pain/swelling/stiffness, oral ulcers, fatigue, chest pain, shortness of breath, cough, nausea, vomiting, diarrhea, abdominal pain. She saw Dr Bar (hepatoloty) last week who has initiated a work-up which includes a liver biopsy (scheduled on 12/28) as well as MRI focusing on the biliary ducts (?PBC).  Of note, he raises the possibility that PBC may be related to her previous long term use of estrogen.  She additionally adds that she was asked if she has pruritus and said no, but that she responded "incorrectly" as she has mild pruritus of her lower extremities at night.\par \par Jan 19, 2022 PtGA 0.6\par f/u SLE--has had mild stiffness in her hands, otherwise without fever, rash, anorexia, weight loss, alopecia, lymphadenopathy, chills, joint pain/swelling/stiffness, oral ulcers, fatigue, dry eyes, chest pain, shortness of breath, cough, anosmia, ageusia, nausea, vomiting, diarrhea, abdominal pain. She has had a liver biopsy showing changes most consistent with a drug reaction.  Methotrexate may be a likely culprit although her long-standing birthcontrol (estrogen/progesterone) may have contributed.  Dr Bar is planning to monitor for 3 months.  She was offerred urosodiol (but has decided not to take it).  She was encouraged to lose weight. Of interest, her esr was 46 when checked last week.\par \par Mar 9, 2021 PtGA 1.3\par f/u SLE, continues with stiffness in her hands, lasting almost all days at times, as well as in knees and ankles (rt>left).  She notes that she has been more active as she is trying to lose weight. She has been on a collagen supplement for several months, missed a few days while travelling ~2 weeks ago and noted increased alopecia as well as the development of a malar rash.  No fever, rash, anorexia, weight loss, lymphadenopathy, chills, joint pain/swelling/stiffness, oral ulcers, fatigue, dry eyes, chest pain, shortness of breath, cough, anosmia, ageusia, nausea, vomiting, diarrhea, abdominal pain. No longer on folic acid (stopped when mtx was discontinued).\par \par May 23, 2022 PtGA 1.5\par f/u SLE, continues to feel about the same, i.e. she still notes stiffness in her hands and ankles.  However, there is no fever, rash, anorexia, alopecia, lymphadenopathy, chills, joint pain/swelling/stiffness, oral ulcers, fatigue, dry eyes, chest pain, shortness of breath, cough, anosmia, ageusia, nausea, vomiting, diarrhea, abdominal pain. She has been actively trying to lose weight (and has successfully lost 7 lbs).  She has continued with GI/liver follow-up and has started ursodiol treatment to facilitate recovery of lft abnormalities. Colchicine d/c'd by GI, but no significant clinical change.  Noting right hip/sciatic type pain which is improved upon taking cyclobenzaprine.\par \par Meds: Plaquenil 400mg \par \par PE:  119/73  228\par  Skin: no malar erythema a; hair thin (but unchanged), tender right ankle but no synovitis or tenderness of other joints including wrists, shoulders, elbows, MCPs, PIPs and right ankle; no nasal/oral ulcerations, lymphadenopathy, periungal erythema, or edema.  Lungs: Clear, Cor: RRR w/o m/r/g, Abd: soft, NT, BS+, Ext: no CCE, Neuro-gait normal\par \par Imp\par SLE --mild clinical musculoskeletal stiffness/pain but without swelling;   Will check labs  Will continue plaquenil \par COVID-Pt is s/p vaccination (full) with high antibody titers.   s/p booster, considering a second booster\par Hepatic:   Her hepatic work-up is in place with ?PBC, she has an appointment with hepatology next month; will add GGT to her routine lupus labs\par HM:  She received this season's influenza vaccine

## 2022-05-31 LAB
ALBUMIN SERPL ELPH-MCNC: 4.3 G/DL
ALP BLD-CCNC: 209 U/L
ALT SERPL-CCNC: 25 U/L
ANION GAP SERPL CALC-SCNC: 12 MMOL/L
APPEARANCE: CLEAR
AST SERPL-CCNC: 26 U/L
BASOPHILS # BLD AUTO: 0.03 K/UL
BASOPHILS NFR BLD AUTO: 0.5 %
BILIRUB SERPL-MCNC: 0.2 MG/DL
BILIRUBIN URINE: NEGATIVE
BLOOD URINE: NEGATIVE
BUN SERPL-MCNC: 18 MG/DL
C3 SERPL-MCNC: 164 MG/DL
C4 SERPL-MCNC: 23 MG/DL
CALCIUM SERPL-MCNC: 9.1 MG/DL
CHLORIDE SERPL-SCNC: 102 MMOL/L
CO2 SERPL-SCNC: 26 MMOL/L
COLOR: NORMAL
CREAT SERPL-MCNC: 0.59 MG/DL
DSDNA AB SER-ACNC: <12 IU/ML
EGFR: 108 ML/MIN/1.73M2
EOSINOPHIL # BLD AUTO: 0.31 K/UL
EOSINOPHIL NFR BLD AUTO: 4.9 %
ERYTHROCYTE [SEDIMENTATION RATE] IN BLOOD BY WESTERGREN METHOD: 75 MM/HR
GGT SERPL-CCNC: 51 U/L
GLUCOSE QUALITATIVE U: NEGATIVE
GLUCOSE SERPL-MCNC: 94 MG/DL
HCT VFR BLD CALC: 37 %
HGB BLD-MCNC: 11.5 G/DL
IMM GRANULOCYTES NFR BLD AUTO: 0.3 %
KETONES URINE: NEGATIVE
LEUKOCYTE ESTERASE URINE: ABNORMAL
LYMPHOCYTES # BLD AUTO: 2.13 K/UL
LYMPHOCYTES NFR BLD AUTO: 33.4 %
MAN DIFF?: NORMAL
MCHC RBC-ENTMCNC: 27.5 PG
MCHC RBC-ENTMCNC: 31.1 GM/DL
MCV RBC AUTO: 88.5 FL
MONOCYTES # BLD AUTO: 0.47 K/UL
MONOCYTES NFR BLD AUTO: 7.4 %
NEUTROPHILS # BLD AUTO: 3.41 K/UL
NEUTROPHILS NFR BLD AUTO: 53.5 %
NITRITE URINE: NEGATIVE
PH URINE: 5.5
PLATELET # BLD AUTO: 275 K/UL
POTASSIUM SERPL-SCNC: 4.1 MMOL/L
PROT SERPL-MCNC: 6.7 G/DL
PROTEIN URINE: NEGATIVE
RBC # BLD: 4.18 M/UL
RBC # FLD: 13.7 %
SODIUM SERPL-SCNC: 140 MMOL/L
SPECIFIC GRAVITY URINE: 1.01
UROBILINOGEN URINE: NORMAL
WBC # FLD AUTO: 6.37 K/UL

## 2022-07-11 ENCOUNTER — LABORATORY RESULT (OUTPATIENT)
Age: 52
End: 2022-07-11

## 2022-07-11 ENCOUNTER — APPOINTMENT (OUTPATIENT)
Dept: RHEUMATOLOGY | Facility: CLINIC | Age: 52
End: 2022-07-11

## 2022-07-11 VITALS
HEIGHT: 62 IN | BODY MASS INDEX: 41.31 KG/M2 | DIASTOLIC BLOOD PRESSURE: 81 MMHG | RESPIRATION RATE: 16 BRPM | SYSTOLIC BLOOD PRESSURE: 130 MMHG | TEMPERATURE: 97.8 F | HEART RATE: 78 BPM | WEIGHT: 224.5 LBS | OXYGEN SATURATION: 97 %

## 2022-07-11 NOTE — HISTORY OF PRESENT ILLNESS
[FreeTextEntry1] : 51 year old female with SLE diagnosed 2016 with features that include malar rash, oral ulcers, arthritis, pericarditis, DNA+, alopecia, fever.  Of note, she is ROSE-.  Her serologies include - Sm/RNP/Ro/La/aPL; she has never been hypocomplementemic.  Her ESR has been persistently elevated. She has had a skin biopsy with interface dermatitis; her most pressing complaints have been of joint pain and stiffness, malar rash, fatigue.  She is historically anti-Ro negative (which can be associated with SjS and PBC).\par \par \par Febrary 8, 2021\par f/u SLE, on Methotrexate 12.5mg x4 weeks.  She continues on plaquenil and colchicine; MMF was tapered over 3 weeks since her last visit on January 12.  She notes marked improvement and has no pain.  Her previous stiffness has lessoned to ~1 hour (and there have been recent days when she had no stiffness).  She additionally feels markedly more energetic.  There is increased alopecia (?medication related), no rash, nausea, vomiting, abdominal pain, edema, ulcers, lymphadenopathy, fever, anorexia, weight loss, chest pain, shortness of breath, nausea, vomiting, edema, anosmia or ageusia.  She had a UTI the week of Jan 25--received antibiotics from pcp; also with tooth extraction 2/3 (and given amox for 5 days). She notes dryness of her skin (particularly of her hands)\par \par April 19, 2019  PtGA 1.1\par f/u SLE; continues to feel stiff over this past month with occasional swelling of left ankle. However, feels that she may be slightly improved compared to last visit. Methotrexate increased last visit from 12.5 to 15mg/week. Has had nausea following methotrexate on one of her recent doses. Notes that her dip's have developed swelling and cysts (which was exacerbated post-vaccine). Of note,she has a family history of hand oa in females. States that hair continues to be thin. No rash, alopecia, fever, anorexia, weight loss, lymphadenopathy, fatigue, dry eyes, chest pain, shortness of breath, cough, anosmia, ageusia, nausea, vomiting, diarrhea, abdominal pain. She will be visiting her sons in [place] for a college graduation, both sons have received the first dose of the Moderna vaccine.\par \par May 17, 2021  PtGA 1.2\par f/u SLE, feels well--has been taking increased MTX (17.5) for 3 weeks.  Feels fatigue the following day.  Doing well until a few days ago when she notes some facial erythema and some arthralgia of ankles and elbows.  No am stiffness or joint swelling. Noted an oral ulcer on palatte ~2 weeks ago. Went to [place] for sons' graduation. Otherwise, no fever, other rash, headache, anorexia, weight loss, alopecia (hair remains thin), lymphadenopathy, chills, jdry eyes, chest pain, shortness of breath, cough, anosmia, ageusia, nausea, vomiting, diarrhea, abdominal pain.  Of note, ESR decrease to 43 last visit.\par \par June 28, 2021 PtGA 0.3\par f/u SLE--feeling very well on MTX 17.5.  She has tolerated the dose without nausea for the last 2 weeks by holding the colchicine and plaquenil on the day she takes her methotrexae. She was taken off her oral contraeptive by gyn ~4 weeks ago--she had one break-through bleeding (followed by a UTI) for which she received macrobid x 4 days.  Her joints have been "really good" with resolution off tenderness of  right 3rd MCP and elbow; but persistent (but less) mild pain of ankles, there has been no swelling, warmth or stiffness.  She has noticed erythema of her face (she has not used any steroid cream)--the previously seen exematous rash on dorsum of bilateral hands has cleared with steroid cream application.  No fever, rash, anorexia, weight loss, alopecia, lymphadenopathy, chills, oral ulcers (previous oral ulcer has resolved), fatigue, dry eyes, chest pain, shortness of breath, cough, anosmia, ageusia, nausea, vomiting, diarrhea, abdominal pain. \par \par Aug 9, 2021 PtGA 2.0\par f/u SLE, was doing well until 2 weeks ago when she noted onset of stiffness and pain in bilateral wrists and shoulders.  She did not have associated swelling in these joints, nor in her ankles (previously affected).  She states that she has been experiencing many hot flashes since the discontinuation of her estrogen and is sleeping poorly.  Her previous fatigue has returned.  Alopecia continues but no rash (she has used steroid cream intermittently, fever, anorexia, weight loss, lymphadenopathy, chills, oral ulcers, dry eyes, chest pain, shortness of breath, cough, anosmia, ageusia, nausea, vomiting, diarrhea, abdominal pain. She notes that she is fatigued most of Sunday (she takes her methotrexate on Saturday night).\par \par Sept 20, 2021 PtGA 1.8\par f/u SLE; doing well with marked improvement of joint pains, stiffness, alopecia.  No longer with paresthesias down her legs.She still has pains in joints affected by OA. Still with fatigue. Her labs showed increased liver enzymes including GGT; her ALK phos was fractionated with a "normal" percentage of bone and liver.  Her last methotrexate dose was 8/7.  She is additionally taking supplements (including collagen, magnesium, tumeric) and has been meditating.  She received her third dose of the covid vaccine this month (with fatigue and achyness).  She has not been using flexeril (it has made her feel groggy upon awakening)\par \par October 22, 2021  PtGA 1.0\par f/u SLE continuing off methotrexate, with minimal signs/symptoms of "active SLE", i.e. no rash, alopecia (hair remains thin but is not falling out), joint pains/swelling, although she is beginning to note the onset of mild stiffness since off of methotrexate.  Fatigue is about the same (Still with hot flashes have resulted in poor sleep)..  No jaudice, fever, rash, anorexia, weight loss, lymphadenopathy, chills, oral ulcers, dry eyes, chest pain, shortness of breath, cough, anosmia, ageusia, nausea, vomiting, diarrhea, abdominal pain. Since last seen, she has had an abdominal ultrasound showing a fatty liver as well as a hypodense non-vascular intrahepatic structure with recommendation for a MRI with and without contrast.  The MRI showed multiple subcentimeter hepatic cysts as well as a 1.6x1.3 lesion, most likely a focal nodular hyperplasia and a second similarly appearing lesion in the subcapsular region of the right hepatic lobe.  I discussed these findings with the radiologist who recommended monitoring in 3-6 months.  The transaminases have returned to normal, her alk phos is lower and her ESR remains elevated.doing well (and now off methotrexate). \par \par November 15, 2021 PtGA 1.4\par f/u SLE--she saw her pcp last week (Nov 8) and had blood obtained for HgA1c, lipids, thyroid as well as cbc and chem.  She had a painful right occipital lymphnode (last week) which is now not tender and ?diminishing.  The AST from the PCP's labs is minimally elevated, and her ALK phos is greater than previously.  She has no abdominal symptoms.  She has no joint pain or swelling but stiffness is persisting; she additionally notes a small palatal ulcer for ~1 week.  No fever, rash, anorexia, unintentional weight loss (she is dieting and losing weight slowly), alopecia, chills, nasal ulcers, dry eyes, chest pain, shortness of breath, cough, anosmia, ageusia, nausea, vomiting, diarrhea, abdominal pain. She has an appointment with Dr Bar (hepatology) on Dec 6.\par  \par Dec 13, 2021  PtGA  2.2\par F/u SLE--notes increased stiffness in small joints of her hands, additionally with right trochanteric bursa tenderness with difficulty lieing on her right side and with pain extending down lateral leg. The palatal ulcer and occipital (rt) lymph node seen last month have resolved. Otherwise, her symptoms are unchanged without fever, rash, anorexia, weight loss, alopecia, lymphadenopathy, chills, joint pain/swelling/stiffness, oral ulcers, fatigue, chest pain, shortness of breath, cough, nausea, vomiting, diarrhea, abdominal pain. She saw Dr Bar (hepatoloty) last week who has initiated a work-up which includes a liver biopsy (scheduled on 12/28) as well as MRI focusing on the biliary ducts (?PBC).  Of note, he raises the possibility that PBC may be related to her previous long term use of estrogen.  She additionally adds that she was asked if she has pruritus and said no, but that she responded "incorrectly" as she has mild pruritus of her lower extremities at night.\par \par Jan 19, 2022 PtGA 0.6\par f/u SLE--has had mild stiffness in her hands, otherwise without fever, rash, anorexia, weight loss, alopecia, lymphadenopathy, chills, joint pain/swelling/stiffness, oral ulcers, fatigue, dry eyes, chest pain, shortness of breath, cough, anosmia, ageusia, nausea, vomiting, diarrhea, abdominal pain. She has had a liver biopsy showing changes most consistent with a drug reaction.  Methotrexate may be a likely culprit although her long-standing birthcontrol (estrogen/progesterone) may have contributed.  Dr Bar is planning to monitor for 3 months.  She was offerred urosodiol (but has decided not to take it).  She was encouraged to lose weight. Of interest, her esr was 46 when checked last week.\par \par Mar 9, 2021 PtGA 1.3\par f/u SLE, continues with stiffness in her hands, lasting almost all days at times, as well as in knees and ankles (rt>left).  She notes that she has been more active as she is trying to lose weight. She has been on a collagen supplement for several months, missed a few days while travelling ~2 weeks ago and noted increased alopecia as well as the development of a malar rash.  No fever, rash, anorexia, weight loss, lymphadenopathy, chills, joint pain/swelling/stiffness, oral ulcers, fatigue, dry eyes, chest pain, shortness of breath, cough, anosmia, ageusia, nausea, vomiting, diarrhea, abdominal pain. No longer on folic acid (stopped when mtx was discontinued).\par \par May 23, 2022 PtGA 1.5\par f/u SLE, continues to feel about the same, i.e. she still notes stiffness in her hands and ankles.  However, there is no fever, rash, anorexia, alopecia, lymphadenopathy, chills, joint pain/swelling/stiffness, oral ulcers, fatigue, dry eyes, chest pain, shortness of breath, cough, anosmia, ageusia, nausea, vomiting, diarrhea, abdominal pain. She has been actively trying to lose weight (and has successfully lost 7 lbs).  She has continued with GI/liver follow-up and has started ursodiol treatment to facilitate recovery of lft abnormalities. Colchicine d/c'd by GI, but no significant clinical change.  Noting right hip/sciatic type pain which is improved upon taking cyclobenzaprine.\par \par July 11, 2022 PtGA 2.2\par f/u SLE-- over the past few weeks has noted reoccurrance of erythematous skin lesions that are similar to the lesion that was biopsied (and was consistent w SLE).  Still with hand achiness, particularly affecting the joints; still with fatigue.  She received the COVID booster on June 31st which was followed by a fever the next day and achiness and malaise 3 days later-- she tested negative for COVID, although her  tested positive.\par She has had pain in her lateral hip (trocanteric bursa) which is lessened by cyclobenzaprine.  She's noticed that her right ankle pain seems to correlate with the "hip" pain. She is considering cucurmin for it's anti-inflammatory effects.  She continues to lose weight slowly; her liver tests are improved on the urosodiol--she has a follow-up appointment with Dr Bar, hepatology next week.  No fever, anorexia, lymphadenopathy, chills, oral ulcers, fatigue, dry eyes, chest pain, shortness of breath, cough, headaches, nausea, vomiting, diarrhea, abdominal pain. \par \par Meds: Plaquenil 400mg \par \par PE:  119/73  228\par  Skin: ~1cm annular erynthematous lesion on right shin, pailer lesion on left shin and a hyperpigmented lesion in mid back; no malar erythema a; hair thin (but unchanged), tender right ankle but no synovitis or tenderness of other joints including wrists, shoulders, elbows, MCPs, PIPs and right ankle; no nasal/oral ulcerations, lymphadenopathy, periungal erythema, or edema.  Lungs: Clear, Cor: RRR w/o m/r/g, Abd: soft, NT, BS+, Ext: no CCE, Neuro-gait normal\par \par Imp\par SLE --mild clinical musculoskeletal stiffness/pain continueing; new cutaneous lesions; Will check labs and will monitor clinically.  Will continue plaquenil and not initiate new therapy.\par COVID-Pt is s/p vaccination (full) with high antibody titers.   s/p second booster, and, s/p likely infection\par Hepatic:   Her hepatic work-up is in place with ?PBC, she has an appointment with hepatology next month; will add GGT to her routine lupus labs\par HM:  She received this season's influenza vaccine

## 2022-07-11 NOTE — PHYSICAL EXAM
[General Appearance - Alert] : alert [General Appearance - Well Developed] : well developed [General Appearance - Well Nourished] : well nourished [Outer Ear] : the ears and nose were normal in appearance [Extraocular Movements] : extraocular movements were intact [Examination Of The Oral Cavity] : the lips and gums were normal [Nasal Cavity] : the nasal mucosa and septum were normal [Neck Appearance] : the appearance of the neck was normal [Exaggerated Use Of Accessory Muscles For Inspiration] : no accessory muscle use [] : no respiratory distress [Auscultation Breath Sounds / Voice Sounds] : lungs were clear to auscultation bilaterally [Heart Rate And Rhythm] : heart rate was normal and rhythm regular [Heart Sounds] : normal S1 and S2 [Heart Sounds Gallop] : no gallops [Murmurs] : no murmurs [Heart Sounds Pericardial Friction Rub] : no pericardial rub [Bowel Sounds] : normal bowel sounds [Abdomen Soft] : soft [Abdomen Tenderness] : non-tender [Cervical Lymph Nodes Enlarged Posterior Bilaterally] : posterior cervical [Cervical Lymph Nodes Enlarged Anterior Bilaterally] : anterior cervical [Supraclavicular Lymph Nodes Enlarged Bilaterally] : supraclavicular [No CVA Tenderness] : no ~M costovertebral angle tenderness [No Spinal Tenderness] : no spinal tenderness [Abnormal Walk] : normal gait [Nail Clubbing] : no clubbing  or cyanosis of the fingernails [Musculoskeletal - Swelling] : no joint swelling seen [Motor Tone] : muscle strength and tone were normal [Skin Color & Pigmentation] : normal skin color and pigmentation [No Focal Deficits] : no focal deficits [FreeTextEntry1] : see hpi

## 2022-07-12 LAB
ALBUMIN SERPL ELPH-MCNC: 4.7 G/DL
ALP BLD-CCNC: 242 U/L
ALT SERPL-CCNC: 27 U/L
ANION GAP SERPL CALC-SCNC: 16 MMOL/L
APPEARANCE: CLEAR
AST SERPL-CCNC: 30 U/L
BASOPHILS # BLD AUTO: 0.06 K/UL
BASOPHILS NFR BLD AUTO: 1 %
BILIRUB SERPL-MCNC: 0.2 MG/DL
BILIRUBIN URINE: NEGATIVE
BLOOD URINE: NEGATIVE
BUN SERPL-MCNC: 15 MG/DL
C3 SERPL-MCNC: 183 MG/DL
C4 SERPL-MCNC: 28 MG/DL
CALCIUM SERPL-MCNC: 9.2 MG/DL
CHLORIDE SERPL-SCNC: 99 MMOL/L
CO2 SERPL-SCNC: 26 MMOL/L
COLOR: NORMAL
CREAT SERPL-MCNC: 0.6 MG/DL
EGFR: 107 ML/MIN/1.73M2
EOSINOPHIL # BLD AUTO: 0.25 K/UL
EOSINOPHIL NFR BLD AUTO: 4.1 %
ERYTHROCYTE [SEDIMENTATION RATE] IN BLOOD BY WESTERGREN METHOD: 85 MM/HR
GGT SERPL-CCNC: 54 U/L
GLUCOSE QUALITATIVE U: NEGATIVE
GLUCOSE SERPL-MCNC: 65 MG/DL
HCT VFR BLD CALC: 38.9 %
HGB BLD-MCNC: 12.3 G/DL
IMM GRANULOCYTES NFR BLD AUTO: 0.5 %
KETONES URINE: NEGATIVE
LEUKOCYTE ESTERASE URINE: ABNORMAL
LYMPHOCYTES # BLD AUTO: 1.86 K/UL
LYMPHOCYTES NFR BLD AUTO: 30.8 %
MAN DIFF?: NORMAL
MCHC RBC-ENTMCNC: 28 PG
MCHC RBC-ENTMCNC: 31.6 GM/DL
MCV RBC AUTO: 88.4 FL
MONOCYTES # BLD AUTO: 0.5 K/UL
MONOCYTES NFR BLD AUTO: 8.3 %
NEUTROPHILS # BLD AUTO: 3.33 K/UL
NEUTROPHILS NFR BLD AUTO: 55.3 %
NITRITE URINE: NEGATIVE
PH URINE: 6.5
PLATELET # BLD AUTO: 323 K/UL
POTASSIUM SERPL-SCNC: 4.7 MMOL/L
PROT SERPL-MCNC: 7.6 G/DL
PROTEIN URINE: NEGATIVE
RBC # BLD: 4.4 M/UL
RBC # FLD: 13.8 %
SODIUM SERPL-SCNC: 141 MMOL/L
SPECIFIC GRAVITY URINE: 1.01
UROBILINOGEN URINE: NORMAL
WBC # FLD AUTO: 6.03 K/UL

## 2022-07-14 LAB — DSDNA AB SER-ACNC: <12 IU/ML

## 2022-07-20 ENCOUNTER — APPOINTMENT (OUTPATIENT)
Dept: HEPATOLOGY | Facility: CLINIC | Age: 52
End: 2022-07-20

## 2022-07-20 VITALS
WEIGHT: 224 LBS | OXYGEN SATURATION: 97 % | RESPIRATION RATE: 16 BRPM | HEIGHT: 62 IN | DIASTOLIC BLOOD PRESSURE: 79 MMHG | BODY MASS INDEX: 41.22 KG/M2 | TEMPERATURE: 96.5 F | HEART RATE: 74 BPM | SYSTOLIC BLOOD PRESSURE: 124 MMHG

## 2022-07-20 PROCEDURE — 99214 OFFICE O/P EST MOD 30 MIN: CPT

## 2022-07-20 NOTE — PHYSICAL EXAM
[General Appearance - In No Acute Distress] : in no acute distress [General Appearance - Alert] : alert [Sclera] : the sclera and conjunctiva were normal [Outer Ear] : the ears and nose were normal in appearance [Neck Appearance] : the appearance of the neck was normal [] : no respiratory distress [Respiration, Rhythm And Depth] : normal respiratory rhythm and effort [Apical Impulse] : the apical impulse was normal [Bowel Sounds] : normal bowel sounds [Abdomen Soft] : soft [Abnormal Walk] : normal gait [Musculoskeletal - Swelling] : no joint swelling seen [Skin Color & Pigmentation] : normal skin color and pigmentation [Skin Turgor] : normal skin turgor [No Focal Deficits] : no focal deficits [Oriented To Time, Place, And Person] : oriented to person, place, and time [Impaired Insight] : insight and judgment were intact [Scleral Icterus] : No Scleral Icterus [Spider Angioma] : No spider angioma(s) were observed [Abdominal  Ascites] : no ascites [Ascites Fluid Wave] : no ascites fluid wave [Ascites Tense] : ascites is not tense [Asterixis] : no asterixis observed [Jaundice] : No jaundice [Depression] : no depression [Hallucinations] : ~T no ~M hallucinations

## 2022-07-20 NOTE — CONSULT LETTER
[Dear  ___] : Dear  [unfilled], [Consult Letter:] : I had the pleasure of evaluating your patient, [unfilled]. [( Thank you for referring [unfilled] for consultation for _____ )] : Thank you for referring [unfilled] for consultation for [unfilled] [Please see my note below.] : Please see my note below. [Consult Closing:] : Thank you very much for allowing me to participate in the care of this patient.  If you have any questions, please do not hesitate to contact me. [Sincerely,] : Sincerely, [FreeTextEntry2] : Elisa Bliss [FreeTextEntry3] : Dr. Irwin Bar MD\par  of Medicine\par [facility]\par [facility]\par

## 2022-07-20 NOTE — ASSESSMENT
[FreeTextEntry1] : 52 y/o F w/ hx of lupus (diagnosed 2013), morbid obesity, endometriosis, s/p cholecystectomy, s/p oophorectomy referred for RUQ US on 10/7/21 revealing fatty liver, hepatomegaly, and hypoechoic liver lesion in R lobe 1.2 x 1.8 x 1.6cm. MRI 10/18/21 revealed 2 lesions (1.6x1.3cm, 1.1x1cm), both likely focal nodular hyperplasia. Pt with h/o intermittently elevated liver enzymes and elevated ALP.\par \par # Elevated ALP, liver biopsy 12/28/21 c/w DILI due to colchicine+ 15-20% macrosteatosis.\par MRCP 1/2022 shows no biliary pathology.\par Liver biopsy 12/28/21 does not show findings c/w PBC. Her AMA is negative. 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 from 10/2021 to smaller 1/2022\par Asymptomatic\par Not likely related to OCP\par nothing to do as these are benign lesions\par plan on repeat MRI 1/2023\par \par # Hx Elevated AST/ALT\par Suspect degree of ROMERO as well \par Fibroscan 1/11/22 - , 5.4 kPa c/w F0.\par holding off on wegovy, offered it\par losing weight by herself\par \par # RHM\par repeat colonoscopy in 2023\par \par I have advised the patient on maintaining a healthy lifestyle which includes keeping a healthy diet (Mediterranean diet is preferred), calorie reduction of 30%, and regular exercise of at least 150 minutes a week to improve Her fatty liver disease. I have also advised Ms. RAMOS on avoidance of hepatotoxic agents and alcohol.\par \par RTC 5 months\par repeat MRI ordered

## 2022-07-20 NOTE — HISTORY OF PRESENT ILLNESS
[de-identified] : 52 y/o F w/ hx of lupus (diagnosed 2013), morbid obesity, endometriosis, s/p cholecystectomy, s/p oophorectomy referred for RUQ US on 10/7/21 revealing fatty liver, hepatomegaly, and hypoechoic liver lesion in R lobe 1.2 x 1.8 x 1.6cm. MRI 10/18/21 revealed 2 lesions (1.6x1.3cm, 1.1x1cm), both likely focal nodular hyperplasia. Pt with h/o intermittently elevated liver enzymes and elevated ALP.\par \par Was on MTX for SLE, which was stopped in 8/2021 (was taking for <1 year). Previously was on Cellcept (3/2020) which didn't help with her lupus. Also was on a trial drug at [facility] x 3 months which helped bring down ESR. Symptoms include joint pain, facial rashes, lower extremity welts, and hair loss. \par \par Stopped taking Lybrel (levonorgesterel-ethinyl estrad) in 6/2021.\par \par Social hx: nonsmoker, social etoh use (1x /month; 1 glass of wine), no drugs\par Family hx: no liver disease; hereditary arterial sclerosis (paternal side of family), thyroid disease (grandmother and mother)\par \par 11/15/21 BW- AST 29, ALT 26, , , plt 283\par 1/11/22 - weight stable. she says she has been on colchicine for years for joint pains. complaining of ankle pain which limits mobility and exercising. MRI/MRCP shows liver lesions about the same size.\par 4/11/22 visit - alk phos remains still elevated and has not changed much. she never started ursodiol. she is still on colchicine and has been on it for years for primarily joint pain. \par 7/20/22- Patient presents for a follow up visit. She is complaining of hair loss and has red skin lesions consistent with lupus, as well as joint stiffness. She is on Plaquenil for the lupus. She continues to take ursodiol. She stopped colchicine in april of 2022.

## 2022-07-22 ENCOUNTER — TRANSCRIPTION ENCOUNTER (OUTPATIENT)
Age: 52
End: 2022-07-22

## 2022-07-22 ENCOUNTER — OUTPATIENT (OUTPATIENT)
Dept: OUTPATIENT SERVICES | Facility: HOSPITAL | Age: 52
LOS: 1 days | End: 2022-07-22
Payer: COMMERCIAL

## 2022-07-22 ENCOUNTER — APPOINTMENT (OUTPATIENT)
Dept: RADIOLOGY | Facility: CLINIC | Age: 52
End: 2022-07-22

## 2022-07-22 DIAGNOSIS — Z00.8 ENCOUNTER FOR OTHER GENERAL EXAMINATION: ICD-10-CM

## 2022-07-22 DIAGNOSIS — M32.9 SYSTEMIC LUPUS ERYTHEMATOSUS, UNSPECIFIED: ICD-10-CM

## 2022-07-22 PROCEDURE — 77085 DXA BONE DENSITY AXL VRT FX: CPT | Mod: 26

## 2022-07-22 PROCEDURE — 77085 DXA BONE DENSITY AXL VRT FX: CPT

## 2022-07-25 ENCOUNTER — TRANSCRIPTION ENCOUNTER (OUTPATIENT)
Age: 52
End: 2022-07-25

## 2022-07-26 ENCOUNTER — LABORATORY RESULT (OUTPATIENT)
Age: 52
End: 2022-07-26

## 2022-07-28 ENCOUNTER — TRANSCRIPTION ENCOUNTER (OUTPATIENT)
Age: 52
End: 2022-07-28

## 2022-09-07 LAB
APPEARANCE: CLEAR
BACTERIA UR CULT: NORMAL
BILIRUBIN URINE: NEGATIVE
BLOOD URINE: NEGATIVE
COLOR: NORMAL
CREAT SPEC-SCNC: 22 MG/DL
CREAT/PROT UR: NORMAL RATIO
GLUCOSE QUALITATIVE U: NEGATIVE
KETONES URINE: NEGATIVE
LEUKOCYTE ESTERASE URINE: ABNORMAL
NITRITE URINE: NEGATIVE
PH URINE: 6.5
PROT UR-MCNC: <4 MG/DL
PROTEIN URINE: NEGATIVE
SPECIFIC GRAVITY URINE: 1.01
UROBILINOGEN URINE: NORMAL

## 2022-09-12 ENCOUNTER — LABORATORY RESULT (OUTPATIENT)
Age: 52
End: 2022-09-12

## 2022-09-12 ENCOUNTER — APPOINTMENT (OUTPATIENT)
Dept: RHEUMATOLOGY | Facility: CLINIC | Age: 52
End: 2022-09-12

## 2022-09-12 VITALS
DIASTOLIC BLOOD PRESSURE: 76 MMHG | SYSTOLIC BLOOD PRESSURE: 120 MMHG | RESPIRATION RATE: 14 BRPM | TEMPERATURE: 97.7 F | WEIGHT: 228.25 LBS | OXYGEN SATURATION: 97 % | HEIGHT: 61.81 IN | BODY MASS INDEX: 42 KG/M2 | HEART RATE: 76 BPM

## 2022-09-12 NOTE — HISTORY OF PRESENT ILLNESS
[FreeTextEntry1] : 51 year old female with SLE diagnosed 2016 with features that include malar rash, oral ulcers, arthritis, pericarditis, DNA+, alopecia, fever.  Of note, she is ROSE-.  Her serologies include - Sm/RNP/Ro/La/aPL; she has never been hypocomplementemic.  Her ESR has been persistently elevated. She has had a skin biopsy with interface dermatitis; her most pressing complaints have been of joint pain and stiffness, malar rash, fatigue.  She is historically anti-Ro negative (which can be associated with SjS and PBC).\par \par \par Febrary 8, 2021\par f/u SLE, on Methotrexate 12.5mg x4 weeks.  She continues on plaquenil and colchicine; MMF was tapered over 3 weeks since her last visit on January 12.  She notes marked improvement and has no pain.  Her previous stiffness has lessoned to ~1 hour (and there have been recent days when she had no stiffness).  She additionally feels markedly more energetic.  There is increased alopecia (?medication related), no rash, nausea, vomiting, abdominal pain, edema, ulcers, lymphadenopathy, fever, anorexia, weight loss, chest pain, shortness of breath, nausea, vomiting, edema, anosmia or ageusia.  She had a UTI the week of Jan 25--received antibiotics from pcp; also with tooth extraction 2/3 (and given amox for 5 days). She notes dryness of her skin (particularly of her hands)\par \par April 19, 2019  PtGA 1.1\par f/u SLE; continues to feel stiff over this past month with occasional swelling of left ankle. However, feels that she may be slightly improved compared to last visit. Methotrexate increased last visit from 12.5 to 15mg/week. Has had nausea following methotrexate on one of her recent doses. Notes that her dip's have developed swelling and cysts (which was exacerbated post-vaccine). Of note,she has a family history of hand oa in females. States that hair continues to be thin. No rash, alopecia, fever, anorexia, weight loss, lymphadenopathy, fatigue, dry eyes, chest pain, shortness of breath, cough, anosmia, ageusia, nausea, vomiting, diarrhea, abdominal pain. She will be visiting her sons in [place] for a college graduation, both sons have received the first dose of the Moderna vaccine.\par \par May 17, 2021  PtGA 1.2\par f/u SLE, feels well--has been taking increased MTX (17.5) for 3 weeks.  Feels fatigue the following day.  Doing well until a few days ago when she notes some facial erythema and some arthralgia of ankles and elbows.  No am stiffness or joint swelling. Noted an oral ulcer on palatte ~2 weeks ago. Went to [place] for sons' graduation. Otherwise, no fever, other rash, headache, anorexia, weight loss, alopecia (hair remains thin), lymphadenopathy, chills, jdry eyes, chest pain, shortness of breath, cough, anosmia, ageusia, nausea, vomiting, diarrhea, abdominal pain.  Of note, ESR decrease to 43 last visit.\par \par June 28, 2021 PtGA 0.3\par f/u SLE--feeling very well on MTX 17.5.  She has tolerated the dose without nausea for the last 2 weeks by holding the colchicine and plaquenil on the day she takes her methotrexae. She was taken off her oral contraeptive by gyn ~4 weeks ago--she had one break-through bleeding (followed by a UTI) for which she received macrobid x 4 days.  Her joints have been "really good" with resolution off tenderness of  right 3rd MCP and elbow; but persistent (but less) mild pain of ankles, there has been no swelling, warmth or stiffness.  She has noticed erythema of her face (she has not used any steroid cream)--the previously seen exematous rash on dorsum of bilateral hands has cleared with steroid cream application.  No fever, rash, anorexia, weight loss, alopecia, lymphadenopathy, chills, oral ulcers (previous oral ulcer has resolved), fatigue, dry eyes, chest pain, shortness of breath, cough, anosmia, ageusia, nausea, vomiting, diarrhea, abdominal pain. \par \par Aug 9, 2021 PtGA 2.0\par f/u SLE, was doing well until 2 weeks ago when she noted onset of stiffness and pain in bilateral wrists and shoulders.  She did not have associated swelling in these joints, nor in her ankles (previously affected).  She states that she has been experiencing many hot flashes since the discontinuation of her estrogen and is sleeping poorly.  Her previous fatigue has returned.  Alopecia continues but no rash (she has used steroid cream intermittently, fever, anorexia, weight loss, lymphadenopathy, chills, oral ulcers, dry eyes, chest pain, shortness of breath, cough, anosmia, ageusia, nausea, vomiting, diarrhea, abdominal pain. She notes that she is fatigued most of Sunday (she takes her methotrexate on Saturday night).\par \par Sept 20, 2021 PtGA 1.8\par f/u SLE; doing well with marked improvement of joint pains, stiffness, alopecia.  No longer with paresthesias down her legs.She still has pains in joints affected by OA. Still with fatigue. Her labs showed increased liver enzymes including GGT; her ALK phos was fractionated with a "normal" percentage of bone and liver.  Her last methotrexate dose was 8/7.  She is additionally taking supplements (including collagen, magnesium, tumeric) and has been meditating.  She received her third dose of the covid vaccine this month (with fatigue and achyness).  She has not been using flexeril (it has made her feel groggy upon awakening)\par \par October 22, 2021  PtGA 1.0\par f/u SLE continuing off methotrexate, with minimal signs/symptoms of "active SLE", i.e. no rash, alopecia (hair remains thin but is not falling out), joint pains/swelling, although she is beginning to note the onset of mild stiffness since off of methotrexate.  Fatigue is about the same (Still with hot flashes have resulted in poor sleep)..  No jaudice, fever, rash, anorexia, weight loss, lymphadenopathy, chills, oral ulcers, dry eyes, chest pain, shortness of breath, cough, anosmia, ageusia, nausea, vomiting, diarrhea, abdominal pain. Since last seen, she has had an abdominal ultrasound showing a fatty liver as well as a hypodense non-vascular intrahepatic structure with recommendation for a MRI with and without contrast.  The MRI showed multiple subcentimeter hepatic cysts as well as a 1.6x1.3 lesion, most likely a focal nodular hyperplasia and a second similarly appearing lesion in the subcapsular region of the right hepatic lobe.  I discussed these findings with the radiologist who recommended monitoring in 3-6 months.  The transaminases have returned to normal, her alk phos is lower and her ESR remains elevated.doing well (and now off methotrexate). \par \par November 15, 2021 PtGA 1.4\par f/u SLE--she saw her pcp last week (Nov 8) and had blood obtained for HgA1c, lipids, thyroid as well as cbc and chem.  She had a painful right occipital lymphnode (last week) which is now not tender and ?diminishing.  The AST from the PCP's labs is minimally elevated, and her ALK phos is greater than previously.  She has no abdominal symptoms.  She has no joint pain or swelling but stiffness is persisting; she additionally notes a small palatal ulcer for ~1 week.  No fever, rash, anorexia, unintentional weight loss (she is dieting and losing weight slowly), alopecia, chills, nasal ulcers, dry eyes, chest pain, shortness of breath, cough, anosmia, ageusia, nausea, vomiting, diarrhea, abdominal pain. She has an appointment with Dr Bar (hepatology) on Dec 6.\par  \par Dec 13, 2021  PtGA  2.2\par F/u SLE--notes increased stiffness in small joints of her hands, additionally with right trochanteric bursa tenderness with difficulty lieing on her right side and with pain extending down lateral leg. The palatal ulcer and occipital (rt) lymph node seen last month have resolved. Otherwise, her symptoms are unchanged without fever, rash, anorexia, weight loss, alopecia, lymphadenopathy, chills, joint pain/swelling/stiffness, oral ulcers, fatigue, chest pain, shortness of breath, cough, nausea, vomiting, diarrhea, abdominal pain. She saw Dr Bar (hepatoloty) last week who has initiated a work-up which includes a liver biopsy (scheduled on 12/28) as well as MRI focusing on the biliary ducts (?PBC).  Of note, he raises the possibility that PBC may be related to her previous long term use of estrogen.  She additionally adds that she was asked if she has pruritus and said no, but that she responded "incorrectly" as she has mild pruritus of her lower extremities at night.\par \par Jan 19, 2022 PtGA 0.6\par f/u SLE--has had mild stiffness in her hands, otherwise without fever, rash, anorexia, weight loss, alopecia, lymphadenopathy, chills, joint pain/swelling/stiffness, oral ulcers, fatigue, dry eyes, chest pain, shortness of breath, cough, anosmia, ageusia, nausea, vomiting, diarrhea, abdominal pain. She has had a liver biopsy showing changes most consistent with a drug reaction.  Methotrexate may be a likely culprit although her long-standing birthcontrol (estrogen/progesterone) may have contributed.  Dr Bar is planning to monitor for 3 months.  She was offerred urosodiol (but has decided not to take it).  She was encouraged to lose weight. Of interest, her esr was 46 when checked last week.\par \par Mar 9, 2021 PtGA 1.3\par f/u SLE, continues with stiffness in her hands, lasting almost all days at times, as well as in knees and ankles (rt>left).  She notes that she has been more active as she is trying to lose weight. She has been on a collagen supplement for several months, missed a few days while travelling ~2 weeks ago and noted increased alopecia as well as the development of a malar rash.  No fever, rash, anorexia, weight loss, lymphadenopathy, chills, joint pain/swelling/stiffness, oral ulcers, fatigue, dry eyes, chest pain, shortness of breath, cough, anosmia, ageusia, nausea, vomiting, diarrhea, abdominal pain. No longer on folic acid (stopped when mtx was discontinued).\par \par May 23, 2022 PtGA 1.5\par f/u SLE, continues to feel about the same, i.e. she still notes stiffness in her hands and ankles.  However, there is no fever, rash, anorexia, alopecia, lymphadenopathy, chills, joint pain/swelling/stiffness, oral ulcers, fatigue, dry eyes, chest pain, shortness of breath, cough, anosmia, ageusia, nausea, vomiting, diarrhea, abdominal pain. She has been actively trying to lose weight (and has successfully lost 7 lbs).  She has continued with GI/liver follow-up and has started ursodiol treatment to facilitate recovery of lft abnormalities. Colchicine d/c'd by GI, but no significant clinical change.  Noting right hip/sciatic type pain which is improved upon taking cyclobenzaprine.\par \par July 11, 2022 PtGA 2.2\par f/u SLE-- over the past few weeks has noted reoccurrance of erythematous skin lesions that are similar to the lesion that was biopsied (and was consistent w SLE).  Still with hand achiness, particularly affecting the joints; still with fatigue.  She received the COVID booster on June 31st which was followed by a fever the next day and achiness and malaise 3 days later-- she tested negative for COVID, although her  tested positive.\par She has had pain in her lateral hip (trocanteric bursa) which is lessened by cyclobenzaprine.  She's noticed that her right ankle pain seems to correlate with the "hip" pain. She is considering cucurmin for it's anti-inflammatory effects.  She continues to lose weight slowly; her liver tests are improved on the urosodiol--she has a follow-up appointment with Dr Bar, hepatology next week.  No fever, anorexia, lymphadenopathy, chills, oral ulcers, fatigue, dry eyes, chest pain, shortness of breath, cough, headaches, nausea, vomiting, diarrhea, abdominal pain. \par \par Sept 12, 2022 PtGA 0.4\par f/u SLE, she is here for the ACEi screening visit.  In general, she is feeling better with no ankle stiffness or swelling of her ankles and with decreased (minimal) symptoms of right trocanteric bursa tenderness.  She has been receiving weekly acupuncture.  She continues on ursodiol and notes that she has had alopecia and intermittent pruritic rashes since initiating the drug which she attributes to the medication.  Otherwise, doing well, the previously seen ~1cm annular erynthematous lesion on right shin, pailer lesion on left shin and a hyperpigmented lesion in mid back have resolved.  She has no fever, rash, anorexia, weight loss, lymphadenopathy, chills, joint pain/swelling/stiffness, oral ulcers, fatigue, dry eyes, chest pain, shortness of breath, cough, anosmia, ageusia, nausea, vomiting, diarrhea, abdominal pain.  \par \par Meds: Plaquenil 400mg, cyclobenzaprine 5mg po prn\par \par PE:  120/76  228\par  Skin:  no malar erythema a; hair thin (but unchanged), tender right ankle but no synovitis or tenderness of other joints including wrists, shoulders, elbows, MCPs, PIPs and right ankle; no nasal/oral ulcerations, lymphadenopathy, periungal erythema, or edema.  Lungs: Clear, Cor: RRR w/o m/r/g, Abd: soft, NT, BS+, Ext: no CCE, Neuro-gait normal\par \par Imp\par SLE --No signs/symptoms of clinical disease activity--her alopecia and pruritic macular rash are consistent with urosodiol.  Creening procedures for the ACEi study performed.  Will continue plaquenil.\par COVID-Pt is s/p vaccination (full) with high antibody titers.   s/p second booster, and, s/p likely infection\par HM:  She received this influenza vaccine last season

## 2022-09-12 NOTE — PHYSICAL EXAM
[General Appearance - Alert] : alert [General Appearance - Well Nourished] : well nourished [General Appearance - Well Developed] : well developed [Extraocular Movements] : extraocular movements were intact [Outer Ear] : the ears and nose were normal in appearance [Examination Of The Oral Cavity] : the lips and gums were normal [Nasal Cavity] : the nasal mucosa and septum were normal [Neck Appearance] : the appearance of the neck was normal [] : no respiratory distress [Exaggerated Use Of Accessory Muscles For Inspiration] : no accessory muscle use [Auscultation Breath Sounds / Voice Sounds] : lungs were clear to auscultation bilaterally [Heart Rate And Rhythm] : heart rate was normal and rhythm regular [Heart Sounds] : normal S1 and S2 [Heart Sounds Gallop] : no gallops [Murmurs] : no murmurs [Heart Sounds Pericardial Friction Rub] : no pericardial rub [Bowel Sounds] : normal bowel sounds [Abdomen Soft] : soft [Abdomen Tenderness] : non-tender [Cervical Lymph Nodes Enlarged Posterior Bilaterally] : posterior cervical [Cervical Lymph Nodes Enlarged Anterior Bilaterally] : anterior cervical [Supraclavicular Lymph Nodes Enlarged Bilaterally] : supraclavicular [No CVA Tenderness] : no ~M costovertebral angle tenderness [No Spinal Tenderness] : no spinal tenderness [Abnormal Walk] : normal gait [Nail Clubbing] : no clubbing  or cyanosis of the fingernails [Musculoskeletal - Swelling] : no joint swelling seen [Motor Tone] : muscle strength and tone were normal [Skin Color & Pigmentation] : normal skin color and pigmentation [No Focal Deficits] : no focal deficits [FreeTextEntry1] : scattered erythematous macular lesions on bilateral hips and lateral thights and arms (described as pruritic)

## 2022-10-05 ENCOUNTER — OUTPATIENT (OUTPATIENT)
Dept: OUTPATIENT SERVICES | Facility: HOSPITAL | Age: 52
LOS: 1 days | End: 2022-10-05
Payer: SUBSIDIZED

## 2022-10-05 ENCOUNTER — APPOINTMENT (OUTPATIENT)
Dept: RHEUMATOLOGY | Facility: CLINIC | Age: 52
End: 2022-10-05

## 2022-10-05 VITALS
DIASTOLIC BLOOD PRESSURE: 73 MMHG | BODY MASS INDEX: 41.59 KG/M2 | SYSTOLIC BLOOD PRESSURE: 119 MMHG | HEART RATE: 70 BPM | OXYGEN SATURATION: 100 % | TEMPERATURE: 97.7 F | WEIGHT: 226 LBS | RESPIRATION RATE: 16 BRPM

## 2022-10-05 DIAGNOSIS — Z00.6 ENCOUNTER FOR EXAMINATION FOR NORMAL COMPARISON AND CONTROL IN CLINICAL RESEARCH PROGRAM: ICD-10-CM

## 2022-10-05 DIAGNOSIS — M32.10 SYSTEMIC LUPUS ERYTHEMATOSUS, ORGAN OR SYSTEM INVOLVEMENT UNSPECIFIED: ICD-10-CM

## 2022-10-05 LAB — GLUCOSE BLDC GLUCOMTR-MCNC: 91 MG/DL — SIGNIFICANT CHANGE UP (ref 70–99)

## 2022-10-05 PROCEDURE — A9552: CPT

## 2022-10-05 PROCEDURE — 78608 BRAIN IMAGING (PET): CPT

## 2022-10-05 PROCEDURE — 82962 GLUCOSE BLOOD TEST: CPT

## 2022-10-12 LAB
ERYTHROCYTE [SEDIMENTATION RATE] IN BLOOD BY WESTERGREN METHOD: 81 MM/HR
GGT SERPL-CCNC: 43 U/L

## 2022-10-24 ENCOUNTER — APPOINTMENT (OUTPATIENT)
Dept: RHEUMATOLOGY | Facility: CLINIC | Age: 52
End: 2022-10-24

## 2022-10-24 VITALS
HEART RATE: 74 BPM | OXYGEN SATURATION: 97 % | DIASTOLIC BLOOD PRESSURE: 86 MMHG | BODY MASS INDEX: 42.69 KG/M2 | WEIGHT: 232 LBS | RESPIRATION RATE: 14 BRPM | SYSTOLIC BLOOD PRESSURE: 123 MMHG | TEMPERATURE: 97.7 F

## 2022-10-24 NOTE — HISTORY OF PRESENT ILLNESS
[FreeTextEntry1] : 51 year old female with SLE diagnosed 2016 with features that include malar rash, oral ulcers, arthritis, pericarditis, DNA+, alopecia, fever.  Of note, she is ROSE-.  Her serologies include - Sm/RNP/Ro/La/aPL; she has never been hypocomplementemic.  Her ESR has been persistently elevated. She has had a skin biopsy with interface dermatitis; her most pressing complaints have been of joint pain and stiffness, malar rash, fatigue.  She is historically anti-Ro negative (which can be associated with SjS and PBC).\par \par \par Febrary 8, 2021\par f/u SLE, on Methotrexate 12.5mg x4 weeks.  She continues on plaquenil and colchicine; MMF was tapered over 3 weeks since her last visit on January 12.  She notes marked improvement and has no pain.  Her previous stiffness has lessoned to ~1 hour (and there have been recent days when she had no stiffness).  She additionally feels markedly more energetic.  There is increased alopecia (?medication related), no rash, nausea, vomiting, abdominal pain, edema, ulcers, lymphadenopathy, fever, anorexia, weight loss, chest pain, shortness of breath, nausea, vomiting, edema, anosmia or ageusia.  She had a UTI the week of Jan 25--received antibiotics from pcp; also with tooth extraction 2/3 (and given amox for 5 days). She notes dryness of her skin (particularly of her hands)\par \par April 19, 2019  PtGA 1.1\par f/u SLE; continues to feel stiff over this past month with occasional swelling of left ankle. However, feels that she may be slightly improved compared to last visit. Methotrexate increased last visit from 12.5 to 15mg/week. Has had nausea following methotrexate on one of her recent doses. Notes that her dip's have developed swelling and cysts (which was exacerbated post-vaccine). Of note,she has a family history of hand oa in females. States that hair continues to be thin. No rash, alopecia, fever, anorexia, weight loss, lymphadenopathy, fatigue, dry eyes, chest pain, shortness of breath, cough, anosmia, ageusia, nausea, vomiting, diarrhea, abdominal pain. She will be visiting her sons in [place] for a college graduation, both sons have received the first dose of the Moderna vaccine.\par \par May 17, 2021  PtGA 1.2\par f/u SLE, feels well--has been taking increased MTX (17.5) for 3 weeks.  Feels fatigue the following day.  Doing well until a few days ago when she notes some facial erythema and some arthralgia of ankles and elbows.  No am stiffness or joint swelling. Noted an oral ulcer on palatte ~2 weeks ago. Went to [place] for sons' graduation. Otherwise, no fever, other rash, headache, anorexia, weight loss, alopecia (hair remains thin), lymphadenopathy, chills, jdry eyes, chest pain, shortness of breath, cough, anosmia, ageusia, nausea, vomiting, diarrhea, abdominal pain.  Of note, ESR decrease to 43 last visit.\par \par June 28, 2021 PtGA 0.3\par f/u SLE--feeling very well on MTX 17.5.  She has tolerated the dose without nausea for the last 2 weeks by holding the colchicine and plaquenil on the day she takes her methotrexae. She was taken off her oral contraeptive by gyn ~4 weeks ago--she had one break-through bleeding (followed by a UTI) for which she received macrobid x 4 days.  Her joints have been "really good" with resolution off tenderness of  right 3rd MCP and elbow; but persistent (but less) mild pain of ankles, there has been no swelling, warmth or stiffness.  She has noticed erythema of her face (she has not used any steroid cream)--the previously seen exematous rash on dorsum of bilateral hands has cleared with steroid cream application.  No fever, rash, anorexia, weight loss, alopecia, lymphadenopathy, chills, oral ulcers (previous oral ulcer has resolved), fatigue, dry eyes, chest pain, shortness of breath, cough, anosmia, ageusia, nausea, vomiting, diarrhea, abdominal pain. \par \par Aug 9, 2021 PtGA 2.0\par f/u SLE, was doing well until 2 weeks ago when she noted onset of stiffness and pain in bilateral wrists and shoulders.  She did not have associated swelling in these joints, nor in her ankles (previously affected).  She states that she has been experiencing many hot flashes since the discontinuation of her estrogen and is sleeping poorly.  Her previous fatigue has returned.  Alopecia continues but no rash (she has used steroid cream intermittently, fever, anorexia, weight loss, lymphadenopathy, chills, oral ulcers, dry eyes, chest pain, shortness of breath, cough, anosmia, ageusia, nausea, vomiting, diarrhea, abdominal pain. She notes that she is fatigued most of Sunday (she takes her methotrexate on Saturday night).\par \par Sept 20, 2021 PtGA 1.8\par f/u SLE; doing well with marked improvement of joint pains, stiffness, alopecia.  No longer with paresthesias down her legs.She still has pains in joints affected by OA. Still with fatigue. Her labs showed increased liver enzymes including GGT; her ALK phos was fractionated with a "normal" percentage of bone and liver.  Her last methotrexate dose was 8/7.  She is additionally taking supplements (including collagen, magnesium, tumeric) and has been meditating.  She received her third dose of the covid vaccine this month (with fatigue and achyness).  She has not been using flexeril (it has made her feel groggy upon awakening)\par \par October 22, 2021  PtGA 1.0\par f/u SLE continuing off methotrexate, with minimal signs/symptoms of "active SLE", i.e. no rash, alopecia (hair remains thin but is not falling out), joint pains/swelling, although she is beginning to note the onset of mild stiffness since off of methotrexate.  Fatigue is about the same (Still with hot flashes have resulted in poor sleep)..  No jaudice, fever, rash, anorexia, weight loss, lymphadenopathy, chills, oral ulcers, dry eyes, chest pain, shortness of breath, cough, anosmia, ageusia, nausea, vomiting, diarrhea, abdominal pain. Since last seen, she has had an abdominal ultrasound showing a fatty liver as well as a hypodense non-vascular intrahepatic structure with recommendation for a MRI with and without contrast.  The MRI showed multiple subcentimeter hepatic cysts as well as a 1.6x1.3 lesion, most likely a focal nodular hyperplasia and a second similarly appearing lesion in the subcapsular region of the right hepatic lobe.  I discussed these findings with the radiologist who recommended monitoring in 3-6 months.  The transaminases have returned to normal, her alk phos is lower and her ESR remains elevated.doing well (and now off methotrexate). \par \par November 15, 2021 PtGA 1.4\par f/u SLE--she saw her pcp last week (Nov 8) and had blood obtained for HgA1c, lipids, thyroid as well as cbc and chem.  She had a painful right occipital lymphnode (last week) which is now not tender and ?diminishing.  The AST from the PCP's labs is minimally elevated, and her ALK phos is greater than previously.  She has no abdominal symptoms.  She has no joint pain or swelling but stiffness is persisting; she additionally notes a small palatal ulcer for ~1 week.  No fever, rash, anorexia, unintentional weight loss (she is dieting and losing weight slowly), alopecia, chills, nasal ulcers, dry eyes, chest pain, shortness of breath, cough, anosmia, ageusia, nausea, vomiting, diarrhea, abdominal pain. She has an appointment with Dr Bar (hepatology) on Dec 6.\par  \par Dec 13, 2021  PtGA  2.2\par F/u SLE--notes increased stiffness in small joints of her hands, additionally with right trochanteric bursa tenderness with difficulty lieing on her right side and with pain extending down lateral leg. The palatal ulcer and occipital (rt) lymph node seen last month have resolved. Otherwise, her symptoms are unchanged without fever, rash, anorexia, weight loss, alopecia, lymphadenopathy, chills, joint pain/swelling/stiffness, oral ulcers, fatigue, chest pain, shortness of breath, cough, nausea, vomiting, diarrhea, abdominal pain. She saw Dr Bar (hepatoloty) last week who has initiated a work-up which includes a liver biopsy (scheduled on 12/28) as well as MRI focusing on the biliary ducts (?PBC).  Of note, he raises the possibility that PBC may be related to her previous long term use of estrogen.  She additionally adds that she was asked if she has pruritus and said no, but that she responded "incorrectly" as she has mild pruritus of her lower extremities at night.\par \par Jan 19, 2022 PtGA 0.6\par f/u SLE--has had mild stiffness in her hands, otherwise without fever, rash, anorexia, weight loss, alopecia, lymphadenopathy, chills, joint pain/swelling/stiffness, oral ulcers, fatigue, dry eyes, chest pain, shortness of breath, cough, anosmia, ageusia, nausea, vomiting, diarrhea, abdominal pain. She has had a liver biopsy showing changes most consistent with a drug reaction.  Methotrexate may be a likely culprit although her long-standing birthcontrol (estrogen/progesterone) may have contributed.  Dr Bar is planning to monitor for 3 months.  She was offerred urosodiol (but has decided not to take it).  She was encouraged to lose weight. Of interest, her esr was 46 when checked last week.\par \par Mar 9, 2021 PtGA 1.3\par f/u SLE, continues with stiffness in her hands, lasting almost all days at times, as well as in knees and ankles (rt>left).  She notes that she has been more active as she is trying to lose weight. She has been on a collagen supplement for several months, missed a few days while travelling ~2 weeks ago and noted increased alopecia as well as the development of a malar rash.  No fever, rash, anorexia, weight loss, lymphadenopathy, chills, joint pain/swelling/stiffness, oral ulcers, fatigue, dry eyes, chest pain, shortness of breath, cough, anosmia, ageusia, nausea, vomiting, diarrhea, abdominal pain. No longer on folic acid (stopped when mtx was discontinued).\par \par May 23, 2022 PtGA 1.5\par f/u SLE, continues to feel about the same, i.e. she still notes stiffness in her hands and ankles.  However, there is no fever, rash, anorexia, alopecia, lymphadenopathy, chills, joint pain/swelling/stiffness, oral ulcers, fatigue, dry eyes, chest pain, shortness of breath, cough, anosmia, ageusia, nausea, vomiting, diarrhea, abdominal pain. She has been actively trying to lose weight (and has successfully lost 7 lbs).  She has continued with GI/liver follow-up and has started ursodiol treatment to facilitate recovery of lft abnormalities. Colchicine d/c'd by GI, but no significant clinical change.  Noting right hip/sciatic type pain which is improved upon taking cyclobenzaprine.\par \par July 11, 2022 PtGA 2.2\par f/u SLE-- over the past few weeks has noted reoccurrance of erythematous skin lesions that are similar to the lesion that was biopsied (and was consistent w SLE).  Still with hand achiness, particularly affecting the joints; still with fatigue.  She received the COVID booster on June 31st which was followed by a fever the next day and achiness and malaise 3 days later-- she tested negative for COVID, although her  tested positive.\par She has had pain in her lateral hip (trocanteric bursa) which is lessened by cyclobenzaprine.  She's noticed that her right ankle pain seems to correlate with the "hip" pain. She is considering cucurmin for it's anti-inflammatory effects.  She continues to lose weight slowly; her liver tests are improved on the urosodiol--she has a follow-up appointment with Dr Bar, hepatology next week.  No fever, anorexia, lymphadenopathy, chills, oral ulcers, fatigue, dry eyes, chest pain, shortness of breath, cough, headaches, nausea, vomiting, diarrhea, abdominal pain. \par \par Sept 12, 2022 PtGA 0.4\par f/u SLE, she is here for the ACEi screening visit.  In general, she is feeling better with no ankle stiffness or swelling of her ankles and with decreased (minimal) symptoms of right trocanteric bursa tenderness.  She has been receiving weekly acupuncture.  She continues on ursodiol and notes that she has had alopecia and intermittent pruritic rashes since initiating the drug which she attributes to the medication.  Otherwise, doing well, the previously seen ~1cm annular erynthematous lesion on right shin, pailer lesion on left shin and a hyperpigmented lesion in mid back have resolved.  She has no fever, rash, anorexia, weight loss, lymphadenopathy, chills, joint pain/swelling/stiffness, oral ulcers, fatigue, dry eyes, chest pain, shortness of breath, cough, anosmia, ageusia, nausea, vomiting, diarrhea, abdominal pain.  \par \par Oct 24, 2022  PtGA 1.5\par f/u SLE-- not able to enroll in ACEi given insufficient uptake on PET scan, now returning for routine visit.  She has been under increased stress with her schedule being "off"-- she was in a hotel for 5 days with change in environment and diet and has missed several recent acupuncture visits.  She notes no hair growth, an oral ulcer on the roof of her mouth, joint stiffness of ankles, knees and with ~1 week right ankle swelling.  No fever, rash, anorexia, weight loss, alopecia, lymphadenopathy, chills, joint pain/swelling/stiffness, oral ulcers, fatigue, dry eyes, chest pain, shortness of breath, cough, nausea, vomiting, diarrhea, abdominal pain. \par \par Meds: Plaquenil 400mg, cyclobenzaprine 5mg po prn\par \par PE:  123/86  232\par  Skin:  no malar erythema a; hair thin (but unchanged), tender and full right ankle, tenderness of knees, hips and shoulders, oral palatal ulcer; no nasal ulcerations, lymphadenopathy, periungal erythema, or edema.  Lungs: Clear, Cor: RRR w/o m/r/g, Abd: soft, NT, BS+, Ext: no CCE, Neuro-gait normal\par \par Imp\par SLE --Increased ms and cutaneous activity; her hepatic enzymes continue to improve;  will check labs--    Not clear if her alopecia and pruritic macular rash are consistent with urosodiol.  Will continue plaquenil., will consider reintroduction of colchicine; if feasible, will attempt to avoid steroids.  She will make an appointment with opthalmology\par COVID-Pt is s/p vaccination (full) with high antibody titers.   s/p second booster, and, s/p likely infection; received the bivalent booster on 9/18/22\par HM:  She received the influenza vaccine 9/18/22

## 2022-10-28 ENCOUNTER — TRANSCRIPTION ENCOUNTER (OUTPATIENT)
Age: 52
End: 2022-10-28

## 2022-12-06 LAB
ALBUMIN SERPL ELPH-MCNC: 4.5 G/DL
ALP BLD-CCNC: 213 U/L
ALT SERPL-CCNC: 20 U/L
ANION GAP SERPL CALC-SCNC: 13 MMOL/L
AST SERPL-CCNC: 24 U/L
BASOPHILS # BLD AUTO: 0.05 K/UL
BASOPHILS NFR BLD AUTO: 0.8 %
BILIRUB SERPL-MCNC: 0.2 MG/DL
BUN SERPL-MCNC: 15 MG/DL
CALCIUM SERPL-MCNC: 9.4 MG/DL
CHLORIDE SERPL-SCNC: 102 MMOL/L
CO2 SERPL-SCNC: 25 MMOL/L
CREAT SERPL-MCNC: 0.6 MG/DL
EGFR: 107 ML/MIN/1.73M2
EOSINOPHIL # BLD AUTO: 0.31 K/UL
EOSINOPHIL NFR BLD AUTO: 4.9 %
GGT SERPL-CCNC: 101 U/L
GLUCOSE SERPL-MCNC: 115 MG/DL
HCT VFR BLD CALC: 38.7 %
HGB BLD-MCNC: 12.1 G/DL
IMM GRANULOCYTES NFR BLD AUTO: 0.2 %
LYMPHOCYTES # BLD AUTO: 2.2 K/UL
LYMPHOCYTES NFR BLD AUTO: 34.4 %
MAN DIFF?: NORMAL
MCHC RBC-ENTMCNC: 27.5 PG
MCHC RBC-ENTMCNC: 31.3 GM/DL
MCV RBC AUTO: 88 FL
MONOCYTES # BLD AUTO: 0.47 K/UL
MONOCYTES NFR BLD AUTO: 7.4 %
NEUTROPHILS # BLD AUTO: 3.35 K/UL
NEUTROPHILS NFR BLD AUTO: 52.3 %
PLATELET # BLD AUTO: 298 K/UL
POTASSIUM SERPL-SCNC: 4.2 MMOL/L
PROT SERPL-MCNC: 7.4 G/DL
RBC # BLD: 4.4 M/UL
RBC # FLD: 13.3 %
SODIUM SERPL-SCNC: 140 MMOL/L
WBC # FLD AUTO: 6.39 K/UL

## 2022-12-07 ENCOUNTER — TRANSCRIPTION ENCOUNTER (OUTPATIENT)
Age: 52
End: 2022-12-07

## 2022-12-19 ENCOUNTER — APPOINTMENT (OUTPATIENT)
Dept: RHEUMATOLOGY | Facility: CLINIC | Age: 52
End: 2022-12-19

## 2023-01-05 ENCOUNTER — APPOINTMENT (OUTPATIENT)
Dept: HEPATOLOGY | Facility: CLINIC | Age: 53
End: 2023-01-05
Payer: COMMERCIAL

## 2023-01-05 VITALS
TEMPERATURE: 97.8 F | RESPIRATION RATE: 14 BRPM | SYSTOLIC BLOOD PRESSURE: 148 MMHG | WEIGHT: 232 LBS | DIASTOLIC BLOOD PRESSURE: 78 MMHG | BODY MASS INDEX: 43.8 KG/M2 | OXYGEN SATURATION: 97 % | HEART RATE: 82 BPM | HEIGHT: 61 IN

## 2023-01-05 PROCEDURE — 99214 OFFICE O/P EST MOD 30 MIN: CPT

## 2023-01-05 RX ORDER — CHOLECALCIFEROL (VITAMIN D3) 25 MCG
TABLET ORAL
Refills: 0 | Status: ACTIVE | COMMUNITY

## 2023-01-05 RX ORDER — FOLIC ACID 1 MG/1
1 TABLET ORAL
Qty: 90 | Refills: 1 | Status: DISCONTINUED | COMMUNITY
Start: 2021-01-12 | End: 2023-01-05

## 2023-01-05 RX ORDER — LORAZEPAM 0.5 MG/1
0.5 TABLET ORAL
Qty: 5 | Refills: 0 | Status: DISCONTINUED | COMMUNITY
Start: 2021-12-10 | End: 2023-01-05

## 2023-01-05 RX ORDER — URSODIOL 500 MG/1
500 TABLET ORAL
Qty: 270 | Refills: 3 | Status: DISCONTINUED | COMMUNITY
Start: 2022-01-11 | End: 2023-01-05

## 2023-01-05 RX ORDER — ASCORBIC ACID 500 MG
TABLET ORAL
Refills: 0 | Status: ACTIVE | COMMUNITY

## 2023-01-05 RX ORDER — SEMAGLUTIDE 0.25 MG/.5ML
0.25 INJECTION, SOLUTION SUBCUTANEOUS
Qty: 4 | Refills: 3 | Status: DISCONTINUED | COMMUNITY
Start: 2022-04-11 | End: 2023-01-05

## 2023-01-06 ENCOUNTER — NON-APPOINTMENT (OUTPATIENT)
Age: 53
End: 2023-01-06

## 2023-01-06 LAB
ALBUMIN SERPL ELPH-MCNC: 4.3 G/DL
ALBUMIN SERPL ELPH-MCNC: 4.4 G/DL
ALP BLD-CCNC: 182 U/L
ALP BLD-CCNC: 243 U/L
ALT SERPL-CCNC: 16 U/L
ALT SERPL-CCNC: 29 U/L
ANION GAP SERPL CALC-SCNC: 11 MMOL/L
ANION GAP SERPL CALC-SCNC: 14 MMOL/L
APPEARANCE: CLEAR
AST SERPL-CCNC: 22 U/L
AST SERPL-CCNC: 29 U/L
BASOPHILS # BLD AUTO: 0.04 K/UL
BASOPHILS # BLD AUTO: 0.06 K/UL
BASOPHILS NFR BLD AUTO: 0.6 %
BASOPHILS NFR BLD AUTO: 1 %
BILIRUB SERPL-MCNC: 0.2 MG/DL
BILIRUB SERPL-MCNC: 0.3 MG/DL
BILIRUBIN URINE: NEGATIVE
BLOOD URINE: NEGATIVE
BUN SERPL-MCNC: 14 MG/DL
BUN SERPL-MCNC: 15 MG/DL
C3 SERPL-MCNC: 157 MG/DL
C3 SERPL-MCNC: 164 MG/DL
C4 SERPL-MCNC: 26 MG/DL
C4 SERPL-MCNC: 28 MG/DL
CALCIUM SERPL-MCNC: 9.3 MG/DL
CALCIUM SERPL-MCNC: 9.5 MG/DL
CHLORIDE SERPL-SCNC: 100 MMOL/L
CHLORIDE SERPL-SCNC: 102 MMOL/L
CO2 SERPL-SCNC: 25 MMOL/L
CO2 SERPL-SCNC: 28 MMOL/L
COLOR: NORMAL
CREAT SERPL-MCNC: 0.55 MG/DL
CREAT SERPL-MCNC: 0.61 MG/DL
DSDNA AB SER-ACNC: <12 IU/ML
EGFR: 106 ML/MIN/1.73M2
EGFR: 110 ML/MIN/1.73M2
EOSINOPHIL # BLD AUTO: 0.29 K/UL
EOSINOPHIL # BLD AUTO: 0.31 K/UL
EOSINOPHIL NFR BLD AUTO: 4.7 %
EOSINOPHIL NFR BLD AUTO: 5.3 %
ERYTHROCYTE [SEDIMENTATION RATE] IN BLOOD BY WESTERGREN METHOD: 41 MM/HR
ERYTHROCYTE [SEDIMENTATION RATE] IN BLOOD BY WESTERGREN METHOD: 86 MM/HR
GGT SERPL-CCNC: 119 U/L
GGT SERPL-CCNC: 41 U/L
GLUCOSE QUALITATIVE U: NEGATIVE
GLUCOSE SERPL-MCNC: 79 MG/DL
GLUCOSE SERPL-MCNC: 91 MG/DL
HCT VFR BLD CALC: 38.6 %
HCT VFR BLD CALC: 39.2 %
HGB BLD-MCNC: 12 G/DL
HGB BLD-MCNC: 12.1 G/DL
IGM SER QL IEP: 98 MG/DL
IMM GRANULOCYTES NFR BLD AUTO: 0.3 %
IMM GRANULOCYTES NFR BLD AUTO: 0.3 %
KETONES URINE: NEGATIVE
LEUKOCYTE ESTERASE URINE: NEGATIVE
LYMPHOCYTES # BLD AUTO: 1.92 K/UL
LYMPHOCYTES # BLD AUTO: 1.92 K/UL
LYMPHOCYTES NFR BLD AUTO: 31.1 %
LYMPHOCYTES NFR BLD AUTO: 32.6 %
MAN DIFF?: NORMAL
MAN DIFF?: NORMAL
MCHC RBC-ENTMCNC: 27.7 PG
MCHC RBC-ENTMCNC: 27.9 PG
MCHC RBC-ENTMCNC: 30.6 GM/DL
MCHC RBC-ENTMCNC: 31.3 GM/DL
MCV RBC AUTO: 89.1 FL
MCV RBC AUTO: 90.5 FL
MONOCYTES # BLD AUTO: 0.5 K/UL
MONOCYTES # BLD AUTO: 0.51 K/UL
MONOCYTES NFR BLD AUTO: 8.1 %
MONOCYTES NFR BLD AUTO: 8.7 %
NEUTROPHILS # BLD AUTO: 3.07 K/UL
NEUTROPHILS # BLD AUTO: 3.41 K/UL
NEUTROPHILS NFR BLD AUTO: 52.1 %
NEUTROPHILS NFR BLD AUTO: 55.2 %
NITRITE URINE: NEGATIVE
PH URINE: 7
PLATELET # BLD AUTO: 296 K/UL
PLATELET # BLD AUTO: 305 K/UL
POTASSIUM SERPL-SCNC: 4.4 MMOL/L
POTASSIUM SERPL-SCNC: 4.7 MMOL/L
PROT SERPL-MCNC: 7.2 G/DL
PROT SERPL-MCNC: 7.4 G/DL
PROTEIN URINE: NEGATIVE
RBC # BLD: 4.33 M/UL
RBC # BLD: 4.33 M/UL
RBC # FLD: 13.5 %
RBC # FLD: 13.9 %
SODIUM SERPL-SCNC: 140 MMOL/L
SODIUM SERPL-SCNC: 141 MMOL/L
SPECIFIC GRAVITY URINE: 1.01
UROBILINOGEN URINE: NORMAL
WBC # FLD AUTO: 5.89 K/UL
WBC # FLD AUTO: 6.18 K/UL

## 2023-01-09 LAB
DSDNA AB SER-ACNC: <12 IU/ML
MITOCHONDRIA AB SER IF-ACNC: NORMAL

## 2023-01-11 ENCOUNTER — APPOINTMENT (OUTPATIENT)
Dept: RHEUMATOLOGY | Facility: CLINIC | Age: 53
End: 2023-01-11

## 2023-01-11 ENCOUNTER — LABORATORY RESULT (OUTPATIENT)
Age: 53
End: 2023-01-11

## 2023-01-11 VITALS
HEART RATE: 74 BPM | RESPIRATION RATE: 16 BRPM | BODY MASS INDEX: 43.46 KG/M2 | DIASTOLIC BLOOD PRESSURE: 78 MMHG | TEMPERATURE: 97.7 F | OXYGEN SATURATION: 96 % | SYSTOLIC BLOOD PRESSURE: 120 MMHG | WEIGHT: 230 LBS

## 2023-01-11 NOTE — HISTORY OF PRESENT ILLNESS
[FreeTextEntry1] : 52 year old female with SLE diagnosed 2016 with features that include malar rash, oral ulcers, arthritis, pericarditis, DNA+, alopecia, fever.  Of note, she is ROSE-.  Her serologies include - Sm/RNP/Ro/La/aPL; she has never been hypocomplementemic.  Her ESR has been persistently elevated. She has had a skin biopsy with interface dermatitis; her most pressing complaints have been of joint pain and stiffness, malar rash, fatigue.  She is historically anti-Ro negative (which can be associated with SjS and PBC).\par \par \par Febrary 8, 2021\par f/u SLE, on Methotrexate 12.5mg x4 weeks.  She continues on plaquenil and colchicine; MMF was tapered over 3 weeks since her last visit on January 12.  She notes marked improvement and has no pain.  Her previous stiffness has lessoned to ~1 hour (and there have been recent days when she had no stiffness).  She additionally feels markedly more energetic.  There is increased alopecia (?medication related), no rash, nausea, vomiting, abdominal pain, edema, ulcers, lymphadenopathy, fever, anorexia, weight loss, chest pain, shortness of breath, nausea, vomiting, edema, anosmia or ageusia.  She had a UTI the week of Jan 25--received antibiotics from pcp; also with tooth extraction 2/3 (and given amox for 5 days). She notes dryness of her skin (particularly of her hands)\par \par April 19, 2019  PtGA 1.1\par f/u SLE; continues to feel stiff over this past month with occasional swelling of left ankle. However, feels that she may be slightly improved compared to last visit. Methotrexate increased last visit from 12.5 to 15mg/week. Has had nausea following methotrexate on one of her recent doses. Notes that her dip's have developed swelling and cysts (which was exacerbated post-vaccine). Of note,she has a family history of hand oa in females. States that hair continues to be thin. No rash, alopecia, fever, anorexia, weight loss, lymphadenopathy, fatigue, dry eyes, chest pain, shortness of breath, cough, anosmia, ageusia, nausea, vomiting, diarrhea, abdominal pain. She will be visiting her sons in [place] for a college graduation, both sons have received the first dose of the Moderna vaccine.\par \par May 17, 2021  PtGA 1.2\par f/u SLE, feels well--has been taking increased MTX (17.5) for 3 weeks.  Feels fatigue the following day.  Doing well until a few days ago when she notes some facial erythema and some arthralgia of ankles and elbows.  No am stiffness or joint swelling. Noted an oral ulcer on palatte ~2 weeks ago. Went to [place] for sons' graduation. Otherwise, no fever, other rash, headache, anorexia, weight loss, alopecia (hair remains thin), lymphadenopathy, chills, jdry eyes, chest pain, shortness of breath, cough, anosmia, ageusia, nausea, vomiting, diarrhea, abdominal pain.  Of note, ESR decrease to 43 last visit.\par \par June 28, 2021 PtGA 0.3\par f/u SLE--feeling very well on MTX 17.5.  She has tolerated the dose without nausea for the last 2 weeks by holding the colchicine and plaquenil on the day she takes her methotrexae. She was taken off her oral contraeptive by gyn ~4 weeks ago--she had one break-through bleeding (followed by a UTI) for which she received macrobid x 4 days.  Her joints have been "really good" with resolution off tenderness of  right 3rd MCP and elbow; but persistent (but less) mild pain of ankles, there has been no swelling, warmth or stiffness.  She has noticed erythema of her face (she has not used any steroid cream)--the previously seen exematous rash on dorsum of bilateral hands has cleared with steroid cream application.  No fever, rash, anorexia, weight loss, alopecia, lymphadenopathy, chills, oral ulcers (previous oral ulcer has resolved), fatigue, dry eyes, chest pain, shortness of breath, cough, anosmia, ageusia, nausea, vomiting, diarrhea, abdominal pain. \par \par Aug 9, 2021 PtGA 2.0\par f/u SLE, was doing well until 2 weeks ago when she noted onset of stiffness and pain in bilateral wrists and shoulders.  She did not have associated swelling in these joints, nor in her ankles (previously affected).  She states that she has been experiencing many hot flashes since the discontinuation of her estrogen and is sleeping poorly.  Her previous fatigue has returned.  Alopecia continues but no rash (she has used steroid cream intermittently, fever, anorexia, weight loss, lymphadenopathy, chills, oral ulcers, dry eyes, chest pain, shortness of breath, cough, anosmia, ageusia, nausea, vomiting, diarrhea, abdominal pain. She notes that she is fatigued most of Sunday (she takes her methotrexate on Saturday night).\par \par Sept 20, 2021 PtGA 1.8\par f/u SLE; doing well with marked improvement of joint pains, stiffness, alopecia.  No longer with paresthesias down her legs.She still has pains in joints affected by OA. Still with fatigue. Her labs showed increased liver enzymes including GGT; her ALK phos was fractionated with a "normal" percentage of bone and liver.  Her last methotrexate dose was 8/7.  She is additionally taking supplements (including collagen, magnesium, tumeric) and has been meditating.  She received her third dose of the covid vaccine this month (with fatigue and achyness).  She has not been using flexeril (it has made her feel groggy upon awakening)\par \par October 22, 2021  PtGA 1.0\par f/u SLE continuing off methotrexate, with minimal signs/symptoms of "active SLE", i.e. no rash, alopecia (hair remains thin but is not falling out), joint pains/swelling, although she is beginning to note the onset of mild stiffness since off of methotrexate.  Fatigue is about the same (Still with hot flashes have resulted in poor sleep)..  No jaudice, fever, rash, anorexia, weight loss, lymphadenopathy, chills, oral ulcers, dry eyes, chest pain, shortness of breath, cough, anosmia, ageusia, nausea, vomiting, diarrhea, abdominal pain. Since last seen, she has had an abdominal ultrasound showing a fatty liver as well as a hypodense non-vascular intrahepatic structure with recommendation for a MRI with and without contrast.  The MRI showed multiple subcentimeter hepatic cysts as well as a 1.6x1.3 lesion, most likely a focal nodular hyperplasia and a second similarly appearing lesion in the subcapsular region of the right hepatic lobe.  I discussed these findings with the radiologist who recommended monitoring in 3-6 months.  The transaminases have returned to normal, her alk phos is lower and her ESR remains elevated.doing well (and now off methotrexate). \par \par November 15, 2021 PtGA 1.4\par f/u SLE--she saw her pcp last week (Nov 8) and had blood obtained for HgA1c, lipids, thyroid as well as cbc and chem.  She had a painful right occipital lymphnode (last week) which is now not tender and ?diminishing.  The AST from the PCP's labs is minimally elevated, and her ALK phos is greater than previously.  She has no abdominal symptoms.  She has no joint pain or swelling but stiffness is persisting; she additionally notes a small palatal ulcer for ~1 week.  No fever, rash, anorexia, unintentional weight loss (she is dieting and losing weight slowly), alopecia, chills, nasal ulcers, dry eyes, chest pain, shortness of breath, cough, anosmia, ageusia, nausea, vomiting, diarrhea, abdominal pain. She has an appointment with Dr Bar (hepatology) on Dec 6.\par  \par Dec 13, 2021  PtGA  2.2\par F/u SLE--notes increased stiffness in small joints of her hands, additionally with right trochanteric bursa tenderness with difficulty lieing on her right side and with pain extending down lateral leg. The palatal ulcer and occipital (rt) lymph node seen last month have resolved. Otherwise, her symptoms are unchanged without fever, rash, anorexia, weight loss, alopecia, lymphadenopathy, chills, joint pain/swelling/stiffness, oral ulcers, fatigue, chest pain, shortness of breath, cough, nausea, vomiting, diarrhea, abdominal pain. She saw Dr Bar (hepatoloty) last week who has initiated a work-up which includes a liver biopsy (scheduled on 12/28) as well as MRI focusing on the biliary ducts (?PBC).  Of note, he raises the possibility that PBC may be related to her previous long term use of estrogen.  She additionally adds that she was asked if she has pruritus and said no, but that she responded "incorrectly" as she has mild pruritus of her lower extremities at night.\par \par Jan 19, 2022 PtGA 0.6\par f/u SLE--has had mild stiffness in her hands, otherwise without fever, rash, anorexia, weight loss, alopecia, lymphadenopathy, chills, joint pain/swelling/stiffness, oral ulcers, fatigue, dry eyes, chest pain, shortness of breath, cough, anosmia, ageusia, nausea, vomiting, diarrhea, abdominal pain. She has had a liver biopsy showing changes most consistent with a drug reaction.  Methotrexate may be a likely culprit although her long-standing birthcontrol (estrogen/progesterone) may have contributed.  Dr Bar is planning to monitor for 3 months.  She was offerred urosodiol (but has decided not to take it).  She was encouraged to lose weight. Of interest, her esr was 46 when checked last week.\par \par Mar 9, 2021 PtGA 1.3\par f/u SLE, continues with stiffness in her hands, lasting almost all days at times, as well as in knees and ankles (rt>left).  She notes that she has been more active as she is trying to lose weight. She has been on a collagen supplement for several months, missed a few days while travelling ~2 weeks ago and noted increased alopecia as well as the development of a malar rash.  No fever, rash, anorexia, weight loss, lymphadenopathy, chills, joint pain/swelling/stiffness, oral ulcers, fatigue, dry eyes, chest pain, shortness of breath, cough, anosmia, ageusia, nausea, vomiting, diarrhea, abdominal pain. No longer on folic acid (stopped when mtx was discontinued).\par \par May 23, 2022 PtGA 1.5\par f/u SLE, continues to feel about the same, i.e. she still notes stiffness in her hands and ankles.  However, there is no fever, rash, anorexia, alopecia, lymphadenopathy, chills, joint pain/swelling/stiffness, oral ulcers, fatigue, dry eyes, chest pain, shortness of breath, cough, anosmia, ageusia, nausea, vomiting, diarrhea, abdominal pain. She has been actively trying to lose weight (and has successfully lost 7 lbs).  She has continued with GI/liver follow-up and has started ursodiol treatment to facilitate recovery of lft abnormalities. Colchicine d/c'd by GI, but no significant clinical change.  Noting right hip/sciatic type pain which is improved upon taking cyclobenzaprine.\par \par July 11, 2022 PtGA 2.2\par f/u SLE-- over the past few weeks has noted reoccurrance of erythematous skin lesions that are similar to the lesion that was biopsied (and was consistent w SLE).  Still with hand achiness, particularly affecting the joints; still with fatigue.  She received the COVID booster on June 31st which was followed by a fever the next day and achiness and malaise 3 days later-- she tested negative for COVID, although her  tested positive.\par She has had pain in her lateral hip (trocanteric bursa) which is lessened by cyclobenzaprine.  She's noticed that her right ankle pain seems to correlate with the "hip" pain. She is considering cucurmin for it's anti-inflammatory effects.  She continues to lose weight slowly; her liver tests are improved on the urosodiol--she has a follow-up appointment with Dr Bar, hepatology next week.  No fever, anorexia, lymphadenopathy, chills, oral ulcers, fatigue, dry eyes, chest pain, shortness of breath, cough, headaches, nausea, vomiting, diarrhea, abdominal pain. \par \par Sept 12, 2022 PtGA 0.4\par f/u SLE, she is here for the ACEi screening visit.  In general, she is feeling better with no ankle stiffness or swelling of her ankles and with decreased (minimal) symptoms of right trocanteric bursa tenderness.  She has been receiving weekly acupuncture.  She continues on ursodiol and notes that she has had alopecia and intermittent pruritic rashes since initiating the drug which she attributes to the medication.  Otherwise, doing well, the previously seen ~1cm annular erynthematous lesion on right shin, pailer lesion on left shin and a hyperpigmented lesion in mid back have resolved.  She has no fever, rash, anorexia, weight loss, lymphadenopathy, chills, joint pain/swelling/stiffness, oral ulcers, fatigue, dry eyes, chest pain, shortness of breath, cough, anosmia, ageusia, nausea, vomiting, diarrhea, abdominal pain.  \par \par Oct 24, 2022  PtGA 1.5\par f/u SLE-- not able to enroll in ACEi given insufficient uptake on PET scan, now returning for routine visit.  She has been under increased stress with her schedule being "off"-- she was in a hotel for 5 days with change in environment and diet and has missed several recent acupuncture visits.  She notes no hair growth, an oral ulcer on the roof of her mouth, joint stiffness of ankles, knees and with ~1 week right ankle swelling.  No fever, rash, anorexia, weight loss, alopecia, lymphadenopathy, chills, joint pain/swelling/stiffness, oral ulcers, fatigue, dry eyes, chest pain, shortness of breath, cough, nausea, vomiting, diarrhea, abdominal pain. \par \par Jan 11, 2023  PtGA 1.5\par f/u SLE, noted increased symptoms starting in December with pain and stiffness in right mcp's, pip's, elbow and ankle.  Additionally noted malar rash.  Her symptoms have improved but she still has significant stiffness for several hours.  She saw hepatology and advised that she can use ibuprofen prn, and that the likelihood of colchicine contributing to the etiology of her elevated transaminases is low.  (Of interest, her oral collagen supplementation may be playing a role).  She has been receiving acupuncture since early September and notes that her right hip pain has resolved. No fever, rash, anorexia, weight loss, alopecia (continues to be stable since stopping urosodiol), lymphadenopathy, chills, oral ulcers, fatigue, dry eyes, chest pain, shortness of breath, cough, anosmia, ageusia, nausea, vomiting, diarrhea, abdominal pain. \par \par Meds: Plaquenil 400mg (scheduled for ophtho 1/2023), cyclobenzaprine 5mg po prn\par \par PE:  120/78  230\par  Skin:  no malar erythema a; hair thin (but unchanged), tender and full right ankle, mild tenderness and fullness of rt 2nd and 3rd mcp's; right lateral epicondyl tenderness, no oral/nasal ulcerations, lymphadenopathy, periungal erythema, or edema.  Lungs: Clear, Cor: RRR w/o m/r/g, Abd: soft, NT, BS+, Ext: no CCE, Neuro-gait normal\par \par Imp\par SLE --Increased ms and cutaneous activity now improving, but present; will initiate colchicine (daily); labs from hepatology are stable (mild increase in hepatic enzymes); she is having blood work done on 1/30-- will add her "lupus labs".     Will continue plaquenil., she has an appointment with opthalmology this month\par COVID-Pt is s/p vaccination (full) with high antibody titers.   s/p second booster, and, s/p likely infection; received the bivalent booster on 9/18/22\par HM:  She received the influenza vaccine 9/18/22

## 2023-01-18 LAB
APPEARANCE: CLEAR
BILIRUBIN URINE: NEGATIVE
BLOOD URINE: NEGATIVE
COLOR: NORMAL
GLUCOSE QUALITATIVE U: NEGATIVE
KETONES URINE: NEGATIVE
LEUKOCYTE ESTERASE URINE: ABNORMAL
MISCELLANEOUS TEST: NORMAL
NITRITE URINE: NEGATIVE
PH URINE: 7
PROC NAME: NORMAL
PROTEIN URINE: NEGATIVE
SPECIFIC GRAVITY URINE: 1.01
UROBILINOGEN URINE: NORMAL

## 2023-01-22 NOTE — HISTORY OF PRESENT ILLNESS
[de-identified] : Ms. Sanchez is a very pleasant 51 yo F with morbid obesity (with BMI >40 kg/m2), endometriosis (previously on Lybrel, now off since 2021), and SLE (diagnosed in , with a past history of MMF use and a past history of methotrexate use for <1 year last in 2021, currently on HCQ), who is being seen for follow-up of chronic liver enzyme elevations as well as right hepatic lobe lesions thought to be focal nodular hyperplasia (FNH).\par \par She was previously followed by hepatologist Dr. Irwin Bar (with last visit on 22) and is being seen for routine follow-up today and to transition her hepatology care as he is no longer at this practice.\par \par Prior liver biopsy (21) showed mild portal and lobular inflammation with no interface activity or bile duct injury, no fibrosis, and mild (15-20%) steatosis without steatohepatitis. Preceding laboratory work-up was unremarkable, including negative AMA and normal ACE level. FibroScan (22) showed normal median liver stiffness of 5.4 kPA and borderline CAP score 261 dB/m consistent with mild steatosis. She was empirically started on ursodiol 500 mg po tid due to concern for possible idiosyncratic drug-induced liver injury (DILI) possibly related to colchicine (discontinued in 2022). However, she discontinued the ursodiol after reaching out to me on 10/28/22 due to multiple side effects.\par \par More recently, she underwent a repeat MRI abdomen with and without contrast on 22 that showed normal liver morphology and signal with unchanged right hepatic lobe lesions up to 1.5 cm likely FNH, though with recommendation for a repeat surveillance study with [facility]. She also had re-check labs on 12/15/22 that showed an interval mild increase in her ALP and GGT after stopping the ursodiol, now with  U/L and  U/L, with AST and ALT still within normal limits and liver synthetic function and platelet count both normal.\par \par We reviewed her current and recent medications today in detail. Prior to when Dr. Bar stopped her colchicine around 2022, she had been on it since 2016. She has also been on hydoxychloroquine since 2016. She has been using cyclobenzaprine PRN for years. She never started Wegovy as she was concerned about its potential risks. She has been off prednisone for years apart from a "short burst" for a lupus flare in March. She feels she is having flare now and is scheduled to see her rheumatologist next week. She feels things are worse off colchicine and is hoping to be able to resume it, as she does not want to have to take steroids frequently. She has been undergoing acupuncture for hip bursitis since 2022 and feels it has also helped with ankle swelling. She takes Emergen-C gummies sporadically. She last took a collagen supplement in 2022 to help with hair loss that had worsened while she was previously on methotrexate. She makes her own [place]ian iced tea from bags, occasional kristofer/turmeric tea, but no green tea. She feels that the ursodiol caused adverse effects consisting of worsened hair loss as well as "feeling a lot of pressure like needing to urinate". The sensation resolved and her hair loss improved once she stopped the ursodiol, though she does have some milder chronic hair loss that she attributes to her lupus.\par \par She is working with a nutritionist on "anti-inflammatory" diet, consisting of more whole foods, less processed foods, and trying to eat 200 calories every few hours. She limits herself to 2 fruits/day and eats [employer] whole grain bread 1-2 slices/day. She admits that  "pasta is my downfall" but she is working to eliminate it. She eats rice sporadically. Her  is diabetic, so they have been trying to  do more meal prep for healthy meals at home. She is also getting back to some exercise now that her hip is feeling better.\par \par She has no known family history of liver disease. Her family history is notable for: diabetes (maternal great-grandmother) and early CAD (great-grandfather, paternal grandfather, and father all  <45 years old). Her youngest son also has familial hyperlipidemia and following with a [facility] cardiologist. Her mother and maternal grandmother have hypothyroidism. Her sister has hyperlipidemia. \par \par She has two sons (24 and 22 years old). Never smoker. No alcohol consumption (none since developing elevated liver enzymes for >1 year, previously an occasional glass of wine only). No recreational drug use. 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

## 2023-01-22 NOTE — ASSESSMENT
[FreeTextEntry1] : # Elevated liver enzymes, predominantly with chronic elevations of ALP and GGT:\par - Prior liver biopsy (12/28/21) showed mild portal and lobular inflammation with no interface activity or bile duct injury, no fibrosis, and mild-moderate (15-20%) steatosis without steatohepatitis.\par - Preceding laboratory work-up was unremarkable, including negative AMA and normal ACE level (12/2021).\par - Last FibroScan (1/11/22) showd normal median liver stiffness and borderline CAP score consistent with likely mild steatosis. However, recent MRI (12/19/22) showed normal liver morphology and signal without definite steatosis.\par - She was empirically started on ursodiol 500 mg po tid but discontinued it after a few months due to side effects. While she did have some interval improvement in her ALP and GGT while on ursodiol, these never normalized and will monitor off ursodiol for now, especially as the benefits are unproven outside a diagnosis of PBC.\par - Repeat labs today for trends and will monitor at least q3 months for now. If liver chemistries are worsening, it may be beneficial to pursue a repeat liver biopsy to reassess for AMA-negative PBC, though may also all be related to NAFLD as seen on her prior biopsy.\par - There was no definitive improvement in her liver enzymes after colchicine was discontinued in 4/2022 (at least prior to initiation of ursodiol) and she was previously on this medication since 2016, making idiosyncratic hepatotoxicity less likely. Given recent lupus flare, okay to re-challenge with colchicine with continued monitoring of liver chemistries. No other likely causes of DILI identified by history, though I advised her to remain off the collagen supplement.\par - Given biopsy evidence of NAFLD, we discussed the potential benefits of gradual weight loss, coffee consumption, adherence to a Mediterranean style diet, and aerobic exercise.\par \par # Right hepatic lobe lesions consistent with FNH:\par - Recent MRI (12/19/22) with stable right hepatic lobe lesions up to 1.5 cm consistent with FNH.\par - We discussed that FNH is a benign liver lesion that is composed of a proliferation of hyperplastic hepatocytes surrounding a central stellate scar, and that it is more common in women. Asymptomatic FNH does not require surveillance because of very low risk of lesion growth or complications, and prognosis is excellent as complications such as bleeding are exceedingly rare and malignant transformation does not occur.\par - Repeat MRI in 1 year with [facility] (12/2023) to confirm that these are FNH rather than adenomas.\par \par # Health maintenance:\par - Previously HAV and HBV non-immune (12/2021). Will address vaccinations at future visit.\par - Ms. RAMOS was counseled to: abstain from alcohol; avoid use of herbal and dietary supplements due to potential hepatotoxicity; and limit use of acetaminophen to <2 grams per day.\par \par Next follow-up: 3 months

## 2023-01-31 LAB
BASOPHILS # BLD AUTO: 0.07 K/UL
BASOPHILS NFR BLD AUTO: 1.2 %
EOSINOPHIL # BLD AUTO: 0.41 K/UL
EOSINOPHIL NFR BLD AUTO: 7.1 %
HCT VFR BLD CALC: 38.2 %
HGB BLD-MCNC: 11.8 G/DL
IMM GRANULOCYTES NFR BLD AUTO: 0.3 %
LYMPHOCYTES # BLD AUTO: 2.13 K/UL
LYMPHOCYTES NFR BLD AUTO: 36.9 %
MAN DIFF?: NORMAL
MCHC RBC-ENTMCNC: 27.6 PG
MCHC RBC-ENTMCNC: 30.9 GM/DL
MCV RBC AUTO: 89.5 FL
MONOCYTES # BLD AUTO: 0.47 K/UL
MONOCYTES NFR BLD AUTO: 8.1 %
NEUTROPHILS # BLD AUTO: 2.68 K/UL
NEUTROPHILS NFR BLD AUTO: 46.4 %
PLATELET # BLD AUTO: 282 K/UL
RBC # BLD: 4.27 M/UL
RBC # FLD: 14.1 %
WBC # FLD AUTO: 5.78 K/UL

## 2023-02-01 LAB
APPEARANCE: CLEAR
BILIRUBIN URINE: NEGATIVE
BLOOD URINE: NEGATIVE
C3 SERPL-MCNC: 146 MG/DL
C4 SERPL-MCNC: 26 MG/DL
COLOR: NORMAL
DSDNA AB SER-ACNC: <12 IU/ML
ERYTHROCYTE [SEDIMENTATION RATE] IN BLOOD BY WESTERGREN METHOD: 54 MM/HR
GLUCOSE QUALITATIVE U: NEGATIVE
KETONES URINE: NEGATIVE
LEUKOCYTE ESTERASE URINE: NEGATIVE
NITRITE URINE: NEGATIVE
PH URINE: 6.5
PROTEIN URINE: NEGATIVE
SPECIFIC GRAVITY URINE: 1.01
UROBILINOGEN URINE: NORMAL

## 2023-02-02 LAB
ALBUMIN SERPL ELPH-MCNC: 4.3 G/DL
ALP BLD-CCNC: 219 U/L
ALT SERPL-CCNC: 27 U/L
ANION GAP SERPL CALC-SCNC: 11 MMOL/L
AST SERPL-CCNC: 28 U/L
BILIRUB SERPL-MCNC: 0.3 MG/DL
BUN SERPL-MCNC: 15 MG/DL
CALCIUM SERPL-MCNC: 9.6 MG/DL
CHLORIDE SERPL-SCNC: 102 MMOL/L
CO2 SERPL-SCNC: 28 MMOL/L
CREAT SERPL-MCNC: 0.67 MG/DL
EGFR: 104 ML/MIN/1.73M2
GGT SERPL-CCNC: 102 U/L
GLUCOSE SERPL-MCNC: 89 MG/DL
IGM SER QL IEP: 104 MG/DL
POTASSIUM SERPL-SCNC: 4.6 MMOL/L
PROT SERPL-MCNC: 7.2 G/DL
SODIUM SERPL-SCNC: 141 MMOL/L

## 2023-02-08 ENCOUNTER — APPOINTMENT (OUTPATIENT)
Dept: RHEUMATOLOGY | Facility: CLINIC | Age: 53
End: 2023-02-08

## 2023-02-08 VITALS
RESPIRATION RATE: 16 BRPM | WEIGHT: 237 LBS | OXYGEN SATURATION: 99 % | DIASTOLIC BLOOD PRESSURE: 72 MMHG | BODY MASS INDEX: 44.78 KG/M2 | SYSTOLIC BLOOD PRESSURE: 115 MMHG | TEMPERATURE: 97.7 F | HEART RATE: 67 BPM

## 2023-02-08 NOTE — HISTORY OF PRESENT ILLNESS
[FreeTextEntry1] : 52 year old female with SLE diagnosed 2016 with features that include malar rash, oral ulcers, arthritis, pericarditis, DNA+, alopecia, fever.  Of note, she is ROSE-.  Her serologies include - Sm/RNP/Ro/La/aPL; she has never been hypocomplementemic.  Her ESR has been persistently elevated. She has had a skin biopsy with interface dermatitis; her most pressing complaints have been of joint pain and stiffness, malar rash, fatigue.  She is historically anti-Ro negative (which can be associated with SjS and PBC).\par \par \par Febrary 8, 2021\par f/u SLE, on Methotrexate 12.5mg x4 weeks.  She continues on plaquenil and colchicine; MMF was tapered over 3 weeks since her last visit on January 12.  She notes marked improvement and has no pain.  Her previous stiffness has lessoned to ~1 hour (and there have been recent days when she had no stiffness).  She additionally feels markedly more energetic.  There is increased alopecia (?medication related), no rash, nausea, vomiting, abdominal pain, edema, ulcers, lymphadenopathy, fever, anorexia, weight loss, chest pain, shortness of breath, nausea, vomiting, edema, anosmia or ageusia.  She had a UTI the week of Jan 25--received antibiotics from pcp; also with tooth extraction 2/3 (and given amox for 5 days). She notes dryness of her skin (particularly of her hands)\par \par April 19, 2019  PtGA 1.1\par f/u SLE; continues to feel stiff over this past month with occasional swelling of left ankle. However, feels that she may be slightly improved compared to last visit. Methotrexate increased last visit from 12.5 to 15mg/week. Has had nausea following methotrexate on one of her recent doses. Notes that her dip's have developed swelling and cysts (which was exacerbated post-vaccine). Of note,she has a family history of hand oa in females. States that hair continues to be thin. No rash, alopecia, fever, anorexia, weight loss, lymphadenopathy, fatigue, dry eyes, chest pain, shortness of breath, cough, anosmia, ageusia, nausea, vomiting, diarrhea, abdominal pain. She will be visiting her sons in [place] for a college graduation, both sons have received the first dose of the Moderna vaccine.\par \par May 17, 2021  PtGA 1.2\par f/u SLE, feels well--has been taking increased MTX (17.5) for 3 weeks.  Feels fatigue the following day.  Doing well until a few days ago when she notes some facial erythema and some arthralgia of ankles and elbows.  No am stiffness or joint swelling. Noted an oral ulcer on palatte ~2 weeks ago. Went to [place] for sons' graduation. Otherwise, no fever, other rash, headache, anorexia, weight loss, alopecia (hair remains thin), lymphadenopathy, chills, jdry eyes, chest pain, shortness of breath, cough, anosmia, ageusia, nausea, vomiting, diarrhea, abdominal pain.  Of note, ESR decrease to 43 last visit.\par \par June 28, 2021 PtGA 0.3\par f/u SLE--feeling very well on MTX 17.5.  She has tolerated the dose without nausea for the last 2 weeks by holding the colchicine and plaquenil on the day she takes her methotrexae. She was taken off her oral contraeptive by gyn ~4 weeks ago--she had one break-through bleeding (followed by a UTI) for which she received macrobid x 4 days.  Her joints have been "really good" with resolution off tenderness of  right 3rd MCP and elbow; but persistent (but less) mild pain of ankles, there has been no swelling, warmth or stiffness.  She has noticed erythema of her face (she has not used any steroid cream)--the previously seen exematous rash on dorsum of bilateral hands has cleared with steroid cream application.  No fever, rash, anorexia, weight loss, alopecia, lymphadenopathy, chills, oral ulcers (previous oral ulcer has resolved), fatigue, dry eyes, chest pain, shortness of breath, cough, anosmia, ageusia, nausea, vomiting, diarrhea, abdominal pain. \par \par Aug 9, 2021 PtGA 2.0\par f/u SLE, was doing well until 2 weeks ago when she noted onset of stiffness and pain in bilateral wrists and shoulders.  She did not have associated swelling in these joints, nor in her ankles (previously affected).  She states that she has been experiencing many hot flashes since the discontinuation of her estrogen and is sleeping poorly.  Her previous fatigue has returned.  Alopecia continues but no rash (she has used steroid cream intermittently, fever, anorexia, weight loss, lymphadenopathy, chills, oral ulcers, dry eyes, chest pain, shortness of breath, cough, anosmia, ageusia, nausea, vomiting, diarrhea, abdominal pain. She notes that she is fatigued most of Sunday (she takes her methotrexate on Saturday night).\par \par Sept 20, 2021 PtGA 1.8\par f/u SLE; doing well with marked improvement of joint pains, stiffness, alopecia.  No longer with paresthesias down her legs.She still has pains in joints affected by OA. Still with fatigue. Her labs showed increased liver enzymes including GGT; her ALK phos was fractionated with a "normal" percentage of bone and liver.  Her last methotrexate dose was 8/7.  She is additionally taking supplements (including collagen, magnesium, tumeric) and has been meditating.  She received her third dose of the covid vaccine this month (with fatigue and achyness).  She has not been using flexeril (it has made her feel groggy upon awakening)\par \par October 22, 2021  PtGA 1.0\par f/u SLE continuing off methotrexate, with minimal signs/symptoms of "active SLE", i.e. no rash, alopecia (hair remains thin but is not falling out), joint pains/swelling, although she is beginning to note the onset of mild stiffness since off of methotrexate.  Fatigue is about the same (Still with hot flashes have resulted in poor sleep)..  No jaudice, fever, rash, anorexia, weight loss, lymphadenopathy, chills, oral ulcers, dry eyes, chest pain, shortness of breath, cough, anosmia, ageusia, nausea, vomiting, diarrhea, abdominal pain. Since last seen, she has had an abdominal ultrasound showing a fatty liver as well as a hypodense non-vascular intrahepatic structure with recommendation for a MRI with and without contrast.  The MRI showed multiple subcentimeter hepatic cysts as well as a 1.6x1.3 lesion, most likely a focal nodular hyperplasia and a second similarly appearing lesion in the subcapsular region of the right hepatic lobe.  I discussed these findings with the radiologist who recommended monitoring in 3-6 months.  The transaminases have returned to normal, her alk phos is lower and her ESR remains elevated.doing well (and now off methotrexate). \par \par November 15, 2021 PtGA 1.4\par f/u SLE--she saw her pcp last week (Nov 8) and had blood obtained for HgA1c, lipids, thyroid as well as cbc and chem.  She had a painful right occipital lymphnode (last week) which is now not tender and ?diminishing.  The AST from the PCP's labs is minimally elevated, and her ALK phos is greater than previously.  She has no abdominal symptoms.  She has no joint pain or swelling but stiffness is persisting; she additionally notes a small palatal ulcer for ~1 week.  No fever, rash, anorexia, unintentional weight loss (she is dieting and losing weight slowly), alopecia, chills, nasal ulcers, dry eyes, chest pain, shortness of breath, cough, anosmia, ageusia, nausea, vomiting, diarrhea, abdominal pain. She has an appointment with Dr Bar (hepatology) on Dec 6.\par  \par Dec 13, 2021  PtGA  2.2\par F/u SLE--notes increased stiffness in small joints of her hands, additionally with right trochanteric bursa tenderness with difficulty lieing on her right side and with pain extending down lateral leg. The palatal ulcer and occipital (rt) lymph node seen last month have resolved. Otherwise, her symptoms are unchanged without fever, rash, anorexia, weight loss, alopecia, lymphadenopathy, chills, joint pain/swelling/stiffness, oral ulcers, fatigue, chest pain, shortness of breath, cough, nausea, vomiting, diarrhea, abdominal pain. She saw Dr Bar (hepatoloty) last week who has initiated a work-up which includes a liver biopsy (scheduled on 12/28) as well as MRI focusing on the biliary ducts (?PBC).  Of note, he raises the possibility that PBC may be related to her previous long term use of estrogen.  She additionally adds that she was asked if she has pruritus and said no, but that she responded "incorrectly" as she has mild pruritus of her lower extremities at night.\par \par Jan 19, 2022 PtGA 0.6\par f/u SLE--has had mild stiffness in her hands, otherwise without fever, rash, anorexia, weight loss, alopecia, lymphadenopathy, chills, joint pain/swelling/stiffness, oral ulcers, fatigue, dry eyes, chest pain, shortness of breath, cough, anosmia, ageusia, nausea, vomiting, diarrhea, abdominal pain. She has had a liver biopsy showing changes most consistent with a drug reaction.  Methotrexate may be a likely culprit although her long-standing birthcontrol (estrogen/progesterone) may have contributed.  Dr Bar is planning to monitor for 3 months.  She was offerred urosodiol (but has decided not to take it).  She was encouraged to lose weight. Of interest, her esr was 46 when checked last week.\par \par Mar 9, 2021 PtGA 1.3\par f/u SLE, continues with stiffness in her hands, lasting almost all days at times, as well as in knees and ankles (rt>left).  She notes that she has been more active as she is trying to lose weight. She has been on a collagen supplement for several months, missed a few days while travelling ~2 weeks ago and noted increased alopecia as well as the development of a malar rash.  No fever, rash, anorexia, weight loss, lymphadenopathy, chills, joint pain/swelling/stiffness, oral ulcers, fatigue, dry eyes, chest pain, shortness of breath, cough, anosmia, ageusia, nausea, vomiting, diarrhea, abdominal pain. No longer on folic acid (stopped when mtx was discontinued).\par \par May 23, 2022 PtGA 1.5\par f/u SLE, continues to feel about the same, i.e. she still notes stiffness in her hands and ankles.  However, there is no fever, rash, anorexia, alopecia, lymphadenopathy, chills, joint pain/swelling/stiffness, oral ulcers, fatigue, dry eyes, chest pain, shortness of breath, cough, anosmia, ageusia, nausea, vomiting, diarrhea, abdominal pain. She has been actively trying to lose weight (and has successfully lost 7 lbs).  She has continued with GI/liver follow-up and has started ursodiol treatment to facilitate recovery of lft abnormalities. Colchicine d/c'd by GI, but no significant clinical change.  Noting right hip/sciatic type pain which is improved upon taking cyclobenzaprine.\par \par July 11, 2022 PtGA 2.2\par f/u SLE-- over the past few weeks has noted reoccurrance of erythematous skin lesions that are similar to the lesion that was biopsied (and was consistent w SLE).  Still with hand achiness, particularly affecting the joints; still with fatigue.  She received the COVID booster on June 31st which was followed by a fever the next day and achiness and malaise 3 days later-- she tested negative for COVID, although her  tested positive.\par She has had pain in her lateral hip (trocanteric bursa) which is lessened by cyclobenzaprine.  She's noticed that her right ankle pain seems to correlate with the "hip" pain. She is considering cucurmin for it's anti-inflammatory effects.  She continues to lose weight slowly; her liver tests are improved on the urosodiol--she has a follow-up appointment with Dr Bar, hepatology next week.  No fever, anorexia, lymphadenopathy, chills, oral ulcers, fatigue, dry eyes, chest pain, shortness of breath, cough, headaches, nausea, vomiting, diarrhea, abdominal pain. \par \par Sept 12, 2022 PtGA 0.4\par f/u SLE, she is here for the ACEi screening visit.  In general, she is feeling better with no ankle stiffness or swelling of her ankles and with decreased (minimal) symptoms of right trocanteric bursa tenderness.  She has been receiving weekly acupuncture.  She continues on ursodiol and notes that she has had alopecia and intermittent pruritic rashes since initiating the drug which she attributes to the medication.  Otherwise, doing well, the previously seen ~1cm annular erynthematous lesion on right shin, pailer lesion on left shin and a hyperpigmented lesion in mid back have resolved.  She has no fever, rash, anorexia, weight loss, lymphadenopathy, chills, joint pain/swelling/stiffness, oral ulcers, fatigue, dry eyes, chest pain, shortness of breath, cough, anosmia, ageusia, nausea, vomiting, diarrhea, abdominal pain.  \par \par Oct 24, 2022  PtGA 1.5\par f/u SLE-- not able to enroll in ACEi given insufficient uptake on PET scan, now returning for routine visit.  She has been under increased stress with her schedule being "off"-- she was in a hotel for 5 days with change in environment and diet and has missed several recent acupuncture visits.  She notes no hair growth, an oral ulcer on the roof of her mouth, joint stiffness of ankles, knees and with ~1 week right ankle swelling.  No fever, rash, anorexia, weight loss, alopecia, lymphadenopathy, chills, joint pain/swelling/stiffness, oral ulcers, fatigue, dry eyes, chest pain, shortness of breath, cough, nausea, vomiting, diarrhea, abdominal pain. \par \par Jan 11, 2023  PtGA 1.5\par f/u SLE, noted increased symptoms starting in December with pain and stiffness in right mcp's, pip's, elbow and ankle. Additionally noted malar rash. Her symptoms have improved but she still has significant stiffness for several hours. She saw hepatology and advised that she can use ibuprofen prn, and that the likelihood of colchicine contributing to the etiology of her elevated transaminases is low. (Of interest, her oral collagen supplementation may be playing a role). She has been receiving acupuncture since early September and notes that her right hip pain has resolved. No fever, rash, anorexia, weight loss, alopecia (continues to be stable since stopping urosodiol), lymphadenopathy, chills, oral ulcers, fatigue, dry eyes, chest pain, shortness of breath, cough, anosmia, ageusia, nausea, vomiting, diarrhea, abdominal pain. \par \par Feb 8, 2023 PtGA 0.3\par f/u SLE, feeling very well; used colchicine daily with relief of her stiffness and joint tenderness.  She had minor GI related symptoms and stopped after 2 weeks, however, her musculoskeletal symptoms continue to be resolved.  She continues with acupuncture which has helped her right hip pain "tremendously", also has helped her right lower back/paraspinal pain (which may have orginiated from "protecting" the right hip as she moved and walked).  No fever, anorexia, weight loss, alopecia, lymphadenopathy, chills, joint pain/swelling/stiffness, oral ulcers, fatigue, dry eyes, chest pain, shortness of breath, cough, anosmia, ageusia, nausea, vomiting, diarrhea, abdominal pain.  Her labs were checked by GI on Jan 31st and her GGT and AlkP although elevated are declining; her ESR has risen slightly from last month but is still lower than previously.  She has had facial erythema.  Previously, was not clear if her alopecia and pruritic macular rash are consistent with urosodiol.  \par \par Meds: Plaquenil 400mg (last optho exam Jan 2023), cyclobenzaprine 5mg po prn\par \par PE:  123/86  232\par  Skin: diffuse facial erythema (?mask), crossing the nasal-labial fold a; hair thin (but unchanged), no tenderness, but ?fullness of right 2nd mcp and left 3rd mcp, tender and full right ankle, tenderness of knees, hips and shoulders, oral palatal ulcer; no nasal ulcerations, lymphadenopathy, periungal erythema, or edema.  Lungs: Clear, Cor: RRR w/o m/r/g, Abd: soft, NT, BS+, Ext: no CCE, Neuro-gait normal\par \par Imp\par SLE --Increased ms and cutaneous activity; her hepatic enzymes continue to improve;  labs from 1/31 (1 week following her discontinuation of colchicine) --   Will continue plaquenil.,\par \par COVID-Pt is s/p vaccination (full) with high antibody titers.   s/p second booster, and, s/p likely infection; received the bivalent booster on 9/18/22\par HM:  She received the influenza vaccine 9/18/22 [id number] Comprehensive

## 2023-04-13 ENCOUNTER — TRANSCRIPTION ENCOUNTER (OUTPATIENT)
Age: 53
End: 2023-04-13

## 2023-04-14 ENCOUNTER — TRANSCRIPTION ENCOUNTER (OUTPATIENT)
Age: 53
End: 2023-04-14

## 2023-04-17 LAB
ALBUMIN SERPL ELPH-MCNC: 4.2 G/DL
ALP BLD-CCNC: 237 U/L
ALT SERPL-CCNC: 33 U/L
ANION GAP SERPL CALC-SCNC: 11 MMOL/L
AST SERPL-CCNC: 31 U/L
BASOPHILS # BLD AUTO: 0.06 K/UL
BASOPHILS NFR BLD AUTO: 1 %
BILIRUB SERPL-MCNC: 0.2 MG/DL
BUN SERPL-MCNC: 15 MG/DL
CALCIUM SERPL-MCNC: 9.5 MG/DL
CHLORIDE SERPL-SCNC: 104 MMOL/L
CO2 SERPL-SCNC: 24 MMOL/L
CREAT SERPL-MCNC: 0.56 MG/DL
EGFR: 108 ML/MIN/1.73M2
EOSINOPHIL # BLD AUTO: 0.28 K/UL
EOSINOPHIL NFR BLD AUTO: 4.8 %
GGT SERPL-CCNC: 116 U/L
GLUCOSE SERPL-MCNC: 97 MG/DL
HCT VFR BLD CALC: 38.1 %
HGB BLD-MCNC: 11.6 G/DL
IGG SER QL IEP: 1149 MG/DL
IMM GRANULOCYTES NFR BLD AUTO: 0.3 %
INR PPP: 0.95 RATIO
LYMPHOCYTES # BLD AUTO: 1.96 K/UL
LYMPHOCYTES NFR BLD AUTO: 33.6 %
MAN DIFF?: NORMAL
MCHC RBC-ENTMCNC: 27.6 PG
MCHC RBC-ENTMCNC: 30.4 GM/DL
MCV RBC AUTO: 90.5 FL
MONOCYTES # BLD AUTO: 0.45 K/UL
MONOCYTES NFR BLD AUTO: 7.7 %
NEUTROPHILS # BLD AUTO: 3.07 K/UL
NEUTROPHILS NFR BLD AUTO: 52.6 %
PLATELET # BLD AUTO: 279 K/UL
POTASSIUM SERPL-SCNC: 4.9 MMOL/L
PROT SERPL-MCNC: 6.8 G/DL
PT BLD: 11.2 SEC
RBC # BLD: 4.21 M/UL
RBC # FLD: 14.3 %
SODIUM SERPL-SCNC: 140 MMOL/L
WBC # FLD AUTO: 5.84 K/UL

## 2023-04-19 ENCOUNTER — APPOINTMENT (OUTPATIENT)
Dept: HEPATOLOGY | Facility: CLINIC | Age: 53
End: 2023-04-19
Payer: COMMERCIAL

## 2023-04-19 VITALS
WEIGHT: 236 LBS | HEART RATE: 70 BPM | BODY MASS INDEX: 43.43 KG/M2 | RESPIRATION RATE: 16 BRPM | OXYGEN SATURATION: 99 % | HEIGHT: 62 IN | DIASTOLIC BLOOD PRESSURE: 78 MMHG | SYSTOLIC BLOOD PRESSURE: 123 MMHG | TEMPERATURE: 98.1 F

## 2023-04-19 DIAGNOSIS — R74.8 ABNORMAL LEVELS OF OTHER SERUM ENZYMES: ICD-10-CM

## 2023-04-19 DIAGNOSIS — K76.89 OTHER SPECIFIED DISEASES OF LIVER: ICD-10-CM

## 2023-04-19 PROCEDURE — 99214 OFFICE O/P EST MOD 30 MIN: CPT

## 2023-04-19 RX ORDER — DICLOFENAC SODIUM 3 G/100G
3 GEL TOPICAL TWICE DAILY
Qty: 1 | Refills: 1 | Status: DISCONTINUED | COMMUNITY
Start: 2021-04-19 | End: 2023-04-19

## 2023-04-19 RX ORDER — MULTIVIT-MIN/IRON/FOLIC ACID/K 18-600-40
400 CAPSULE ORAL
Refills: 0 | Status: DISCONTINUED | COMMUNITY
End: 2023-04-19

## 2023-04-19 RX ORDER — COLCHICINE 0.6 MG/1
0.6 CAPSULE ORAL
Qty: 90 | Refills: 0 | Status: DISCONTINUED | COMMUNITY
Start: 2021-01-20 | End: 2023-04-19

## 2023-04-19 RX ORDER — OMEGA-3/DHA/EPA/FISH OIL 300-1000MG
400 CAPSULE ORAL DAILY
Refills: 0 | Status: ACTIVE | COMMUNITY
Start: 2023-04-19

## 2023-04-23 PROBLEM — R74.8 ALKALINE PHOSPHATASE ELEVATION: Noted: 2021-12-06

## 2023-04-23 PROBLEM — K76.89 FOCAL NODULAR HYPERPLASIA OF LIVER: Status: ACTIVE | Noted: 2021-12-06

## 2023-04-23 NOTE — HISTORY OF PRESENT ILLNESS
[de-identified] : Ms. Sanchez is a very pleasant 51 yo F with morbid obesity (with BMI >40 kg/m2), endometriosis (previously on Lybrel, now off since 2021), and SLE (diagnosed in , with a past history of MMF use and a past history of methotrexate use for <1 year last in 2021, currently on HCQ), who is being seen for follow-up of chronic liver enzyme elevations secondary to nonalcoholic (metabolic) fatty liver disease (NAFLD) as well as right hepatic lobe lesions thought to be focal nodular hyperplasia (FNH).\par \par Prior liver biopsy (21) showed mild portal and lobular inflammation with no interface activity or bile duct injury, no fibrosis, and mild (15-20%) steatosis without steatohepatitis. Preceding laboratory work-up was unremarkable, including negative AMA and normal ACE level. FibroScan (22) showed normal median liver stiffness of 5.4 kPA and borderline CAP score 261 dB/m consistent with mild steatosis. She was empirically started on ursodiol 500 mg po tid due to concern for possible idiosyncratic drug-induced liver injury (DILI) possibly related to colchicine (discontinued in 2022). However, she discontinued the ursodiol after reaching out to me on 10/28/22 due to multiple side effects.\par \par She underwent a repeat MRI abdomen with and without contrast on 22 that showed normal liver morphology and signal with unchanged right hepatic lobe lesions up to 1.5 cm likely FNH, though with recommendation for a repeat surveillance study with [facility].\par \par She was last seen in this office on 23 and is here today for routine follow-up. She remains off ursodiol. She took colchicine for about 2 weeks shortly after her last visit here, but has been off it since then as her rheumatologic symptoms have been much better in the past few months with acupuncture. No new medications. She continues to work with a nutritionist that has her on 5 small meals / snacks throughout the day and a lean meal for dinner, but because she travels so frequently for work, it has been difficult for her to maintain this diet. She has not been exercising regularly but feels motivated to start again. She denies any new supplement use. She has been off collagen since her last visit and takes Emergen-C as needed when traveling. She has occasional but very mild pruritus not affecting her soles or palms.\par \par She has no known family history of liver disease. Her family history is notable for: diabetes (maternal great-grandmother) and early CAD (great-grandfather, paternal grandfather, and father all  <45 years old). Her youngest son also has familial hyperlipidemia and following with a [facility] cardiologist. Her mother and maternal grandmother have hypothyroidism. Her sister has hyperlipidemia. \par \par She has two sons (24 and 22 years old). Never smoker. No alcohol consumption (none since developing elevated liver enzymes for >1 year, previously an occasional glass of wine only). No recreational drug use. 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 [FreeTextEntry1] : -d/c cholcine in Feb. \par - did not tolerate alonzo \par - denies pruritus  \par \par - discussed aiming for 25 lbs weight loss in the next 6 months  \par - discussed Mediterranean diet (allergic to fish) \par - works with a Nutritionist - 5 small meals/ snacks + 1 lean meal\par > travels a lot for work, difficulty keeping up with diet \par - discussed semaglutide, not interested due to side effects \par \par \par plan:\par - labs q3 months, ordered July & Oct. \par -f/up 6 months w/ fibro \par - weight loss goal of 25 lbs., monitor labs to assess improvement. \par > if no improvement, will consider starting alonzo again vs. liver biopsy \par \par \par \par

## 2023-04-23 NOTE — ASSESSMENT
[FreeTextEntry1] : # Elevated liver enzymes, predominantly with chronic elevations of ALP and GGT:\par - Prior liver biopsy (12/28/21) showed mild portal and lobular inflammation with no interface activity or bile duct injury, no fibrosis, and mild-moderate (15-20%) steatosis without steatohepatitis.\par - Preceding laboratory work-up was unremarkable, including negative AMA and normal ACE level (12/2021).\par - Last FibroScan (1/11/22) showed normal median liver stiffness and borderline CAP score consistent with likely mild steatosis. However, recent MRI (12/19/22) showed normal liver morphology and signal without definite steatosis.\par - She was empirically started on ursodiol 500 mg po tid but discontinued it after a few months due to side effects. While she did have some interval improvement in her ALP and GGT while on ursodiol, these never normalized and will monitor off ursodiol for now, especially as the benefits are unproven outside a diagnosis of PBC.\par - Given biopsy evidence of NAFLD, we discussed the potential benefits of gradual weight loss, coffee consumption, adherence to a Mediterranean style diet, and aerobic exercise. These recommendations were reinforced and discussed with her in detail today, including a goal weight loss of 12-25 lbs within the next year (amounting to 5-10% of her current weight). She is hesitant to begin any new medications, such as semaglutide, to assist in weight loss. She was offered a referral to [facility][1] for Weight Management but declined at this time and prefers to continue to follow up with her nutritionist.\par - Will monitor labs q3 months, next due in 7/2023 and 10/2023. If liver chemistries are worsening despite weight loss and lifestyle modifications, it may be beneficial to pursue a repeat liver biopsy to reassess for possible AMA-negative PBC that could warrant re-initiation of ursodiol to prevent disease progression.\par - We will also plan for repeat FibroScan with her next visit in 6 months.\par \par # Right hepatic lobe lesions consistent with FNH:\par - Recent MRI (12/19/22) with stable right hepatic lobe lesions up to 1.5 cm consistent with FNH.\par - We discussed that FNH is a benign liver lesion that is composed of a proliferation of hyperplastic hepatocytes surrounding a central stellate scar, and that it is more common in women. Asymptomatic FNH does not require surveillance because of very low risk of lesion growth or complications, and prognosis is excellent as complications such as bleeding are exceedingly rare and malignant transformation does not occur.\par - Repeat MRI in 1 year with [facility] (12/2023) to confirm that these are FNH rather than adenomas.\par \par # Health maintenance:\par - Previously HAV and HBV non-immune (12/2021). She would like to discuss with her rheumatologist before receiving the vaccines.\par - Ms. RAMOS was counseled to: abstain from alcohol; avoid use of herbal and dietary supplements due to potential hepatotoxicity; and limit use of acetaminophen to <2 grams per day.\par \par Next follow-up: 6 months with same day FibroScan

## 2023-04-26 ENCOUNTER — APPOINTMENT (OUTPATIENT)
Dept: RHEUMATOLOGY | Facility: CLINIC | Age: 53
End: 2023-04-26

## 2023-04-26 ENCOUNTER — LABORATORY RESULT (OUTPATIENT)
Age: 53
End: 2023-04-26

## 2023-04-26 VITALS
TEMPERATURE: 97.8 F | OXYGEN SATURATION: 98 % | DIASTOLIC BLOOD PRESSURE: 74 MMHG | HEART RATE: 74 BPM | WEIGHT: 240 LBS | SYSTOLIC BLOOD PRESSURE: 131 MMHG | RESPIRATION RATE: 16 BRPM | BODY MASS INDEX: 43.9 KG/M2

## 2023-04-26 LAB
APPEARANCE: CLEAR
BILIRUBIN URINE: NEGATIVE
BLOOD URINE: NEGATIVE
COLOR: YELLOW
GLUCOSE QUALITATIVE U: NEGATIVE MG/DL
KETONES URINE: NEGATIVE MG/DL
LEUKOCYTE ESTERASE URINE: ABNORMAL
NITRITE URINE: NEGATIVE
PH URINE: 6.5
PROTEIN URINE: NEGATIVE MG/DL
SPECIFIC GRAVITY URINE: 1.01
UROBILINOGEN URINE: 0.2 MG/DL

## 2023-04-26 NOTE — PHYSICAL EXAM
[General Appearance - Alert] : alert [General Appearance - Well Nourished] : well nourished [General Appearance - Well Developed] : well developed [Extraocular Movements] : extraocular movements were intact [Outer Ear] : the ears and nose were normal in appearance [Examination Of The Oral Cavity] : the lips and gums were normal [Nasal Cavity] : the nasal mucosa and septum were normal [Neck Appearance] : the appearance of the neck was normal [] : no respiratory distress [Exaggerated Use Of Accessory Muscles For Inspiration] : no accessory muscle use [Auscultation Breath Sounds / Voice Sounds] : lungs were clear to auscultation bilaterally [Heart Rate And Rhythm] : heart rate was normal and rhythm regular [Heart Sounds] : normal S1 and S2 [Heart Sounds Gallop] : no gallops [Murmurs] : no murmurs [Heart Sounds Pericardial Friction Rub] : no pericardial rub [Bowel Sounds] : normal bowel sounds [Abdomen Soft] : soft [Abdomen Tenderness] : non-tender [Cervical Lymph Nodes Enlarged Posterior Bilaterally] : posterior cervical [Cervical Lymph Nodes Enlarged Anterior Bilaterally] : anterior cervical [No CVA Tenderness] : no ~M costovertebral angle tenderness [Supraclavicular Lymph Nodes Enlarged Bilaterally] : supraclavicular [No Spinal Tenderness] : no spinal tenderness [Abnormal Walk] : normal gait [Nail Clubbing] : no clubbing  or cyanosis of the fingernails [Motor Tone] : muscle strength and tone were normal [Musculoskeletal - Swelling] : no joint swelling seen [Skin Color & Pigmentation] : normal skin color and pigmentation [No Focal Deficits] : no focal deficits [FreeTextEntry1] : see hpi

## 2023-04-26 NOTE — HISTORY OF PRESENT ILLNESS
[FreeTextEntry1] : 52 year old female with SLE diagnosed 2016 with features that include malar rash, oral ulcers, arthritis, pericarditis, DNA+, alopecia, fever.  Of note, she is ROSE-.  Her serologies include - Sm/RNP/Ro/La/aPL; she has never been hypocomplementemic.  Her ESR has been persistently elevated. She has had a skin biopsy with interface dermatitis; her most pressing complaints have been of joint pain and stiffness, malar rash, fatigue.  She is historically anti-Ro negative (which can be associated with SjS and PBC).\par \par \par Febrary 8, 2021\par f/u SLE, on Methotrexate 12.5mg x4 weeks.  She continues on plaquenil and colchicine; MMF was tapered over 3 weeks since her last visit on January 12.  She notes marked improvement and has no pain.  Her previous stiffness has lessoned to ~1 hour (and there have been recent days when she had no stiffness).  She additionally feels markedly more energetic.  There is increased alopecia (?medication related), no rash, nausea, vomiting, abdominal pain, edema, ulcers, lymphadenopathy, fever, anorexia, weight loss, chest pain, shortness of breath, nausea, vomiting, edema, anosmia or ageusia.  She had a UTI the week of Jan 25--received antibiotics from pcp; also with tooth extraction 2/3 (and given amox for 5 days). She notes dryness of her skin (particularly of her hands)\par \par April 19, 2019  PtGA 1.1\par f/u SLE; continues to feel stiff over this past month with occasional swelling of left ankle. However, feels that she may be slightly improved compared to last visit. Methotrexate increased last visit from 12.5 to 15mg/week. Has had nausea following methotrexate on one of her recent doses. Notes that her dip's have developed swelling and cysts (which was exacerbated post-vaccine). Of note,she has a family history of hand oa in females. States that hair continues to be thin. No rash, alopecia, fever, anorexia, weight loss, lymphadenopathy, fatigue, dry eyes, chest pain, shortness of breath, cough, anosmia, ageusia, nausea, vomiting, diarrhea, abdominal pain. She will be visiting her sons in [place] for a college graduation, both sons have received the first dose of the Moderna vaccine.\par \par May 17, 2021  PtGA 1.2\par f/u SLE, feels well--has been taking increased MTX (17.5) for 3 weeks.  Feels fatigue the following day.  Doing well until a few days ago when she notes some facial erythema and some arthralgia of ankles and elbows.  No am stiffness or joint swelling. Noted an oral ulcer on palatte ~2 weeks ago. Went to [place] for sons' graduation. Otherwise, no fever, other rash, headache, anorexia, weight loss, alopecia (hair remains thin), lymphadenopathy, chills, jdry eyes, chest pain, shortness of breath, cough, anosmia, ageusia, nausea, vomiting, diarrhea, abdominal pain.  Of note, ESR decrease to 43 last visit.\par \par June 28, 2021 PtGA 0.3\par f/u SLE--feeling very well on MTX 17.5.  She has tolerated the dose without nausea for the last 2 weeks by holding the colchicine and plaquenil on the day she takes her methotrexae. She was taken off her oral contraeptive by gyn ~4 weeks ago--she had one break-through bleeding (followed by a UTI) for which she received macrobid x 4 days.  Her joints have been "really good" with resolution off tenderness of  right 3rd MCP and elbow; but persistent (but less) mild pain of ankles, there has been no swelling, warmth or stiffness.  She has noticed erythema of her face (she has not used any steroid cream)--the previously seen exematous rash on dorsum of bilateral hands has cleared with steroid cream application.  No fever, rash, anorexia, weight loss, alopecia, lymphadenopathy, chills, oral ulcers (previous oral ulcer has resolved), fatigue, dry eyes, chest pain, shortness of breath, cough, anosmia, ageusia, nausea, vomiting, diarrhea, abdominal pain. \par \par Aug 9, 2021 PtGA 2.0\par f/u SLE, was doing well until 2 weeks ago when she noted onset of stiffness and pain in bilateral wrists and shoulders.  She did not have associated swelling in these joints, nor in her ankles (previously affected).  She states that she has been experiencing many hot flashes since the discontinuation of her estrogen and is sleeping poorly.  Her previous fatigue has returned.  Alopecia continues but no rash (she has used steroid cream intermittently, fever, anorexia, weight loss, lymphadenopathy, chills, oral ulcers, dry eyes, chest pain, shortness of breath, cough, anosmia, ageusia, nausea, vomiting, diarrhea, abdominal pain. She notes that she is fatigued most of Sunday (she takes her methotrexate on Saturday night).\par \par Sept 20, 2021 PtGA 1.8\par f/u SLE; doing well with marked improvement of joint pains, stiffness, alopecia.  No longer with paresthesias down her legs.She still has pains in joints affected by OA. Still with fatigue. Her labs showed increased liver enzymes including GGT; her ALK phos was fractionated with a "normal" percentage of bone and liver.  Her last methotrexate dose was 8/7.  She is additionally taking supplements (including collagen, magnesium, tumeric) and has been meditating.  She received her third dose of the covid vaccine this month (with fatigue and achyness).  She has not been using flexeril (it has made her feel groggy upon awakening)\par \par October 22, 2021  PtGA 1.0\par f/u SLE continuing off methotrexate, with minimal signs/symptoms of "active SLE", i.e. no rash, alopecia (hair remains thin but is not falling out), joint pains/swelling, although she is beginning to note the onset of mild stiffness since off of methotrexate.  Fatigue is about the same (Still with hot flashes have resulted in poor sleep)..  No jaudice, fever, rash, anorexia, weight loss, lymphadenopathy, chills, oral ulcers, dry eyes, chest pain, shortness of breath, cough, anosmia, ageusia, nausea, vomiting, diarrhea, abdominal pain. Since last seen, she has had an abdominal ultrasound showing a fatty liver as well as a hypodense non-vascular intrahepatic structure with recommendation for a MRI with and without contrast.  The MRI showed multiple subcentimeter hepatic cysts as well as a 1.6x1.3 lesion, most likely a focal nodular hyperplasia and a second similarly appearing lesion in the subcapsular region of the right hepatic lobe.  I discussed these findings with the radiologist who recommended monitoring in 3-6 months.  The transaminases have returned to normal, her alk phos is lower and her ESR remains elevated.doing well (and now off methotrexate). \par \par November 15, 2021 PtGA 1.4\par f/u SLE--she saw her pcp last week (Nov 8) and had blood obtained for HgA1c, lipids, thyroid as well as cbc and chem.  She had a painful right occipital lymphnode (last week) which is now not tender and ?diminishing.  The AST from the PCP's labs is minimally elevated, and her ALK phos is greater than previously.  She has no abdominal symptoms.  She has no joint pain or swelling but stiffness is persisting; she additionally notes a small palatal ulcer for ~1 week.  No fever, rash, anorexia, unintentional weight loss (she is dieting and losing weight slowly), alopecia, chills, nasal ulcers, dry eyes, chest pain, shortness of breath, cough, anosmia, ageusia, nausea, vomiting, diarrhea, abdominal pain. She has an appointment with Dr Bar (hepatology) on Dec 6.\par  \par Dec 13, 2021  PtGA  2.2\par F/u SLE--notes increased stiffness in small joints of her hands, additionally with right trochanteric bursa tenderness with difficulty lieing on her right side and with pain extending down lateral leg. The palatal ulcer and occipital (rt) lymph node seen last month have resolved. Otherwise, her symptoms are unchanged without fever, rash, anorexia, weight loss, alopecia, lymphadenopathy, chills, joint pain/swelling/stiffness, oral ulcers, fatigue, chest pain, shortness of breath, cough, nausea, vomiting, diarrhea, abdominal pain. She saw Dr Bar (hepatoloty) last week who has initiated a work-up which includes a liver biopsy (scheduled on 12/28) as well as MRI focusing on the biliary ducts (?PBC).  Of note, he raises the possibility that PBC may be related to her previous long term use of estrogen.  She additionally adds that she was asked if she has pruritus and said no, but that she responded "incorrectly" as she has mild pruritus of her lower extremities at night.\par \par Jan 19, 2022 PtGA 0.6\par f/u SLE--has had mild stiffness in her hands, otherwise without fever, rash, anorexia, weight loss, alopecia, lymphadenopathy, chills, joint pain/swelling/stiffness, oral ulcers, fatigue, dry eyes, chest pain, shortness of breath, cough, anosmia, ageusia, nausea, vomiting, diarrhea, abdominal pain. She has had a liver biopsy showing changes most consistent with a drug reaction.  Methotrexate may be a likely culprit although her long-standing birthcontrol (estrogen/progesterone) may have contributed.  Dr Bar is planning to monitor for 3 months.  She was offerred urosodiol (but has decided not to take it).  She was encouraged to lose weight. Of interest, her esr was 46 when checked last week.\par \par Mar 9, 2021 PtGA 1.3\par f/u SLE, continues with stiffness in her hands, lasting almost all days at times, as well as in knees and ankles (rt>left).  She notes that she has been more active as she is trying to lose weight. She has been on a collagen supplement for several months, missed a few days while travelling ~2 weeks ago and noted increased alopecia as well as the development of a malar rash.  No fever, rash, anorexia, weight loss, lymphadenopathy, chills, joint pain/swelling/stiffness, oral ulcers, fatigue, dry eyes, chest pain, shortness of breath, cough, anosmia, ageusia, nausea, vomiting, diarrhea, abdominal pain. No longer on folic acid (stopped when mtx was discontinued).\par \par May 23, 2022 PtGA 1.5\par f/u SLE, continues to feel about the same, i.e. she still notes stiffness in her hands and ankles.  However, there is no fever, rash, anorexia, alopecia, lymphadenopathy, chills, joint pain/swelling/stiffness, oral ulcers, fatigue, dry eyes, chest pain, shortness of breath, cough, anosmia, ageusia, nausea, vomiting, diarrhea, abdominal pain. She has been actively trying to lose weight (and has successfully lost 7 lbs).  She has continued with GI/liver follow-up and has started ursodiol treatment to facilitate recovery of lft abnormalities. Colchicine d/c'd by GI, but no significant clinical change.  Noting right hip/sciatic type pain which is improved upon taking cyclobenzaprine.\par \par July 11, 2022 PtGA 2.2\par f/u SLE-- over the past few weeks has noted reoccurrance of erythematous skin lesions that are similar to the lesion that was biopsied (and was consistent w SLE).  Still with hand achiness, particularly affecting the joints; still with fatigue.  She received the COVID booster on June 31st which was followed by a fever the next day and achiness and malaise 3 days later-- she tested negative for COVID, although her  tested positive.\par She has had pain in her lateral hip (trocanteric bursa) which is lessened by cyclobenzaprine.  She's noticed that her right ankle pain seems to correlate with the "hip" pain. She is considering cucurmin for it's anti-inflammatory effects.  She continues to lose weight slowly; her liver tests are improved on the urosodiol--she has a follow-up appointment with Dr Bar, hepatology next week.  No fever, anorexia, lymphadenopathy, chills, oral ulcers, fatigue, dry eyes, chest pain, shortness of breath, cough, headaches, nausea, vomiting, diarrhea, abdominal pain. \par \par Sept 12, 2022 PtGA 0.4\par f/u SLE, she is here for the ACEi screening visit.  In general, she is feeling better with no ankle stiffness or swelling of her ankles and with decreased (minimal) symptoms of right trocanteric bursa tenderness.  She has been receiving weekly acupuncture.  She continues on ursodiol and notes that she has had alopecia and intermittent pruritic rashes since initiating the drug which she attributes to the medication.  Otherwise, doing well, the previously seen ~1cm annular erynthematous lesion on right shin, pailer lesion on left shin and a hyperpigmented lesion in mid back have resolved.  She has no fever, rash, anorexia, weight loss, lymphadenopathy, chills, joint pain/swelling/stiffness, oral ulcers, fatigue, dry eyes, chest pain, shortness of breath, cough, anosmia, ageusia, nausea, vomiting, diarrhea, abdominal pain.  \par \par Oct 24, 2022  PtGA 1.5\par f/u SLE-- not able to enroll in ACEi given insufficient uptake on PET scan, now returning for routine visit.  She has been under increased stress with her schedule being "off"-- she was in a hotel for 5 days with change in environment and diet and has missed several recent acupuncture visits.  She notes no hair growth, an oral ulcer on the roof of her mouth, joint stiffness of ankles, knees and with ~1 week right ankle swelling.  No fever, rash, anorexia, weight loss, alopecia, lymphadenopathy, chills, joint pain/swelling/stiffness, oral ulcers, fatigue, dry eyes, chest pain, shortness of breath, cough, nausea, vomiting, diarrhea, abdominal pain. \par \par Jan 11, 2023  PtGA 1.5\par f/u SLE, noted increased symptoms starting in December with pain and stiffness in right mcp's, pip's, elbow and ankle. Additionally noted malar rash. Her symptoms have improved but she still has significant stiffness for several hours. She saw hepatology and advised that she can use ibuprofen prn, and that the likelihood of colchicine contributing to the etiology of her elevated transaminases is low. (Of interest, her oral collagen supplementation may be playing a role). She has been receiving acupuncture since early September and notes that her right hip pain has resolved. No fever, rash, anorexia, weight loss, alopecia (continues to be stable since stopping urosodiol), lymphadenopathy, chills, oral ulcers, fatigue, dry eyes, chest pain, shortness of breath, cough, anosmia, ageusia, nausea, vomiting, diarrhea, abdominal pain. \par \par Feb 8, 2023 PtGA 0.3\par f/u SLE, feeling very well; used colchicine daily with relief of her stiffness and joint tenderness.  She had minor GI related symptoms and stopped after 2 weeks, however, her musculoskeletal symptoms continue to be resolved.  She continues with acupuncture which has helped her right hip pain "tremendously", also has helped her right lower back/paraspinal pain (which may have orginiated from "protecting" the right hip as she moved and walked).  No fever, anorexia, weight loss, alopecia, lymphadenopathy, chills, joint pain/swelling/stiffness, oral ulcers, fatigue, dry eyes, chest pain, shortness of breath, cough, anosmia, ageusia, nausea, vomiting, diarrhea, abdominal pain.  Her labs were checked by GI on Jan 31st and her GGT and AlkP although elevated are declining; her ESR has risen slightly from last month but is still lower than previously.  She has had facial erythema.  Previously, was not clear if her alopecia and pruritic macular rash are consistent with urosodiol.  \par \par April 26, 2026  0.5\par f/u SLE, states that she took colchicine for 2 weeks and that her ankles and hip are better; she continues to have stiffness (not sure how long it is at its worse, but stiffness continues during the day). She continues with acupunture and attributes her reduction in musculoskeletal symptoms to this intervention; she has not even needed to use voltaren gel.  No fever, rash, anorexia, weight loss, alopecia, lymphadenopathy, chills, joint pain/swelling/stiffness, oral ulcers, fatigue, dry eyes, chest pain, shortness of breath, cough, headache, nausea, vomiting, diarrhea, abdominal pain. She saw hepatology last week and had bloods drawn-- her ALT, AST continue to be normal and her ALP and GGT have increased.  She will attempt to lose weight (25lbs) over next 6 months as this may be contributing to her abnormal liver function.\par \par Meds: Plaquenil 400mg (last optho exam Jan 2023), cyclobenzaprine 5mg po prn\par \par PE:  131/84  240\par  Skin: diffuse facial erythema (?mask), crossing the nasal-labial fold a; hair thin (but unchanged), no tenderness, no synovitis (previous fullness of right 2nd mcp and left 3rd mcp, tender and full right ankle, tenderness of knees not present).  No oral/nasal ulcerations, lymphadenopathy, periungal erythema, or edema.  Lungs: Clear, Cor: RRR w/o m/r/g, Abd: soft, NT, BS+, Ext: no CCE, Neuro-gait normal\par \par Imp\par SLE --Stable clinical disease; as lupus serologies were not drawn last week, will check dna and complements.   Will continue plaquenil.,\par \par COVID-Pt is s/p vaccination (full) with high antibody titers.   s/p second booster, and, s/p likely infection; received the bivalent booster on 9/18/22\par HM:  She received the influenza vaccine 9/18/22

## 2023-04-27 LAB
C3 SERPL-MCNC: 173 MG/DL
C4 SERPL-MCNC: 31 MG/DL
DSDNA AB SER-ACNC: <12 IU/ML

## 2023-05-04 ENCOUNTER — NON-APPOINTMENT (OUTPATIENT)
Age: 53
End: 2023-05-04

## 2023-05-10 ENCOUNTER — NON-APPOINTMENT (OUTPATIENT)
Age: 53
End: 2023-05-10

## 2023-05-22 ENCOUNTER — NON-APPOINTMENT (OUTPATIENT)
Age: 53
End: 2023-05-22

## 2023-05-25 LAB
ALBUMIN SERPL ELPH-MCNC: 4.2 G/DL
ALP BLD-CCNC: 186 U/L
ALT SERPL-CCNC: 28 U/L
ANION GAP SERPL CALC-SCNC: 12 MMOL/L
APPEARANCE: CLEAR
AST SERPL-CCNC: 30 U/L
BILIRUB SERPL-MCNC: 0.2 MG/DL
BILIRUBIN URINE: NEGATIVE
BLOOD URINE: NEGATIVE
BUN SERPL-MCNC: 16 MG/DL
C3 SERPL-MCNC: 139 MG/DL
C4 SERPL-MCNC: 26 MG/DL
CALCIUM SERPL-MCNC: 9.2 MG/DL
CHLORIDE SERPL-SCNC: 105 MMOL/L
CO2 SERPL-SCNC: 26 MMOL/L
COLOR: YELLOW
CREAT SERPL-MCNC: 0.66 MG/DL
EGFR: 104 ML/MIN/1.73M2
ERYTHROCYTE [SEDIMENTATION RATE] IN BLOOD BY WESTERGREN METHOD: 53 MM/HR
GGT SERPL-CCNC: 82 U/L
GLUCOSE QUALITATIVE U: NEGATIVE MG/DL
GLUCOSE SERPL-MCNC: 86 MG/DL
KETONES URINE: NEGATIVE MG/DL
LEUKOCYTE ESTERASE URINE: NEGATIVE
NITRITE URINE: NEGATIVE
PH URINE: 7
POTASSIUM SERPL-SCNC: 4.5 MMOL/L
PROT SERPL-MCNC: 6.7 G/DL
PROTEIN URINE: NEGATIVE MG/DL
SODIUM SERPL-SCNC: 142 MMOL/L
SPECIFIC GRAVITY URINE: 1.02
UROBILINOGEN URINE: 0.2 MG/DL

## 2023-05-26 LAB — DSDNA AB SER-ACNC: <12 IU/ML

## 2023-07-10 ENCOUNTER — APPOINTMENT (OUTPATIENT)
Dept: OBGYN | Facility: CLINIC | Age: 53
End: 2023-07-10
Payer: COMMERCIAL

## 2023-07-10 VITALS
SYSTOLIC BLOOD PRESSURE: 119 MMHG | BODY MASS INDEX: 42.51 KG/M2 | HEIGHT: 62 IN | DIASTOLIC BLOOD PRESSURE: 79 MMHG | WEIGHT: 231 LBS

## 2023-07-10 DIAGNOSIS — Z01.419 ENCOUNTER FOR GYNECOLOGICAL EXAMINATION (GENERAL) (ROUTINE) W/OUT ABNORMAL FINDINGS: ICD-10-CM

## 2023-07-10 PROCEDURE — 82270 OCCULT BLOOD FECES: CPT

## 2023-07-10 PROCEDURE — 99396 PREV VISIT EST AGE 40-64: CPT

## 2023-07-11 LAB — HPV HIGH+LOW RISK DNA PNL CVX: NOT DETECTED

## 2023-07-12 ENCOUNTER — APPOINTMENT (OUTPATIENT)
Dept: RHEUMATOLOGY | Facility: CLINIC | Age: 53
End: 2023-07-12

## 2023-07-12 VITALS
OXYGEN SATURATION: 98 % | WEIGHT: 231 LBS | HEART RATE: 68 BPM | TEMPERATURE: 97.8 F | SYSTOLIC BLOOD PRESSURE: 118 MMHG | RESPIRATION RATE: 16 BRPM | DIASTOLIC BLOOD PRESSURE: 77 MMHG | BODY MASS INDEX: 42.25 KG/M2

## 2023-07-12 LAB
ALBUMIN SERPL ELPH-MCNC: 4.4 G/DL
ALP BLD-CCNC: 187 U/L
ALT SERPL-CCNC: 28 U/L
ANION GAP SERPL CALC-SCNC: 16 MMOL/L
AST SERPL-CCNC: 36 U/L
BILIRUB SERPL-MCNC: 0.2 MG/DL
BUN SERPL-MCNC: 14 MG/DL
CALCIUM SERPL-MCNC: 9.3 MG/DL
CHLORIDE SERPL-SCNC: 99 MMOL/L
CO2 SERPL-SCNC: 25 MMOL/L
CREAT SERPL-MCNC: 0.64 MG/DL
EGFR: 105 ML/MIN/1.73M2
GGT SERPL-CCNC: 87 U/L
GLUCOSE SERPL-MCNC: 57 MG/DL
POTASSIUM SERPL-SCNC: 4.2 MMOL/L
PROT SERPL-MCNC: 7.4 G/DL
SODIUM SERPL-SCNC: 140 MMOL/L

## 2023-07-12 NOTE — PHYSICAL EXAM
[General Appearance - Alert] : alert [General Appearance - Well Nourished] : well nourished [General Appearance - Well Developed] : well developed [Extraocular Movements] : extraocular movements were intact [Outer Ear] : the ears and nose were normal in appearance [Examination Of The Oral Cavity] : the lips and gums were normal [Nasal Cavity] : the nasal mucosa and septum were normal [Neck Appearance] : the appearance of the neck was normal [] : no respiratory distress [Exaggerated Use Of Accessory Muscles For Inspiration] : no accessory muscle use [Auscultation Breath Sounds / Voice Sounds] : lungs were clear to auscultation bilaterally [Heart Rate And Rhythm] : heart rate was normal and rhythm regular [Heart Sounds] : normal S1 and S2 [Heart Sounds Gallop] : no gallops [Murmurs] : no murmurs [Bowel Sounds] : normal bowel sounds [Heart Sounds Pericardial Friction Rub] : no pericardial rub [Abdomen Soft] : soft [Abdomen Tenderness] : non-tender [Cervical Lymph Nodes Enlarged Posterior Bilaterally] : posterior cervical [Cervical Lymph Nodes Enlarged Anterior Bilaterally] : anterior cervical [Supraclavicular Lymph Nodes Enlarged Bilaterally] : supraclavicular [No CVA Tenderness] : no ~M costovertebral angle tenderness [No Spinal Tenderness] : no spinal tenderness [Abnormal Walk] : normal gait [Nail Clubbing] : no clubbing  or cyanosis of the fingernails [Musculoskeletal - Swelling] : no joint swelling seen [Motor Tone] : muscle strength and tone were normal [Skin Color & Pigmentation] : normal skin color and pigmentation [No Focal Deficits] : no focal deficits [FreeTextEntry1] : see hpi

## 2023-07-12 NOTE — HISTORY OF PRESENT ILLNESS
[FreeTextEntry1] : 52 year old female with SLE diagnosed 2016 with features that include malar rash, oral ulcers, arthritis, pericarditis, DNA+, alopecia, fever.  Of note, she is ROSE-.  Her serologies include - Sm/RNP/Ro/La/aPL; she has never been hypocomplementemic.  Her ESR has been persistently elevated. She has had a skin biopsy with interface dermatitis; her most pressing complaints have been of joint pain and stiffness, malar rash, fatigue.  She is historically anti-Ro negative (which can be associated with SjS and PBC).\par \par \par Febrary 8, 2021\par f/u SLE, on Methotrexate 12.5mg x4 weeks.  She continues on plaquenil and colchicine; MMF was tapered over 3 weeks since her last visit on January 12.  She notes marked improvement and has no pain.  Her previous stiffness has lessoned to ~1 hour (and there have been recent days when she had no stiffness).  She additionally feels markedly more energetic.  There is increased alopecia (?medication related), no rash, nausea, vomiting, abdominal pain, edema, ulcers, lymphadenopathy, fever, anorexia, weight loss, chest pain, shortness of breath, nausea, vomiting, edema, anosmia or ageusia.  She had a UTI the week of Jan 25--received antibiotics from pcp; also with tooth extraction 2/3 (and given amox for 5 days). She notes dryness of her skin (particularly of her hands)\par \par April 19, 2019  PtGA 1.1\par f/u SLE; continues to feel stiff over this past month with occasional swelling of left ankle. However, feels that she may be slightly improved compared to last visit. Methotrexate increased last visit from 12.5 to 15mg/week. Has had nausea following methotrexate on one of her recent doses. Notes that her dip's have developed swelling and cysts (which was exacerbated post-vaccine). Of note,she has a family history of hand oa in females. States that hair continues to be thin. No rash, alopecia, fever, anorexia, weight loss, lymphadenopathy, fatigue, dry eyes, chest pain, shortness of breath, cough, anosmia, ageusia, nausea, vomiting, diarrhea, abdominal pain. She will be visiting her sons in [place] for a college graduation, both sons have received the first dose of the Moderna vaccine.\par \par May 17, 2021  PtGA 1.2\par f/u SLE, feels well--has been taking increased MTX (17.5) for 3 weeks.  Feels fatigue the following day.  Doing well until a few days ago when she notes some facial erythema and some arthralgia of ankles and elbows.  No am stiffness or joint swelling. Noted an oral ulcer on palatte ~2 weeks ago. Went to [place] for sons' graduation. Otherwise, no fever, other rash, headache, anorexia, weight loss, alopecia (hair remains thin), lymphadenopathy, chills, jdry eyes, chest pain, shortness of breath, cough, anosmia, ageusia, nausea, vomiting, diarrhea, abdominal pain.  Of note, ESR decrease to 43 last visit.\par \par June 28, 2021 PtGA 0.3\par f/u SLE--feeling very well on MTX 17.5.  She has tolerated the dose without nausea for the last 2 weeks by holding the colchicine and plaquenil on the day she takes her methotrexae. She was taken off her oral contraeptive by gyn ~4 weeks ago--she had one break-through bleeding (followed by a UTI) for which she received macrobid x 4 days.  Her joints have been "really good" with resolution off tenderness of  right 3rd MCP and elbow; but persistent (but less) mild pain of ankles, there has been no swelling, warmth or stiffness.  She has noticed erythema of her face (she has not used any steroid cream)--the previously seen exematous rash on dorsum of bilateral hands has cleared with steroid cream application.  No fever, rash, anorexia, weight loss, alopecia, lymphadenopathy, chills, oral ulcers (previous oral ulcer has resolved), fatigue, dry eyes, chest pain, shortness of breath, cough, anosmia, ageusia, nausea, vomiting, diarrhea, abdominal pain. \par \par Aug 9, 2021 PtGA 2.0\par f/u SLE, was doing well until 2 weeks ago when she noted onset of stiffness and pain in bilateral wrists and shoulders.  She did not have associated swelling in these joints, nor in her ankles (previously affected).  She states that she has been experiencing many hot flashes since the discontinuation of her estrogen and is sleeping poorly.  Her previous fatigue has returned.  Alopecia continues but no rash (she has used steroid cream intermittently, fever, anorexia, weight loss, lymphadenopathy, chills, oral ulcers, dry eyes, chest pain, shortness of breath, cough, anosmia, ageusia, nausea, vomiting, diarrhea, abdominal pain. She notes that she is fatigued most of Sunday (she takes her methotrexate on Saturday night).\par \par Sept 20, 2021 PtGA 1.8\par f/u SLE; doing well with marked improvement of joint pains, stiffness, alopecia.  No longer with paresthesias down her legs.She still has pains in joints affected by OA. Still with fatigue. Her labs showed increased liver enzymes including GGT; her ALK phos was fractionated with a "normal" percentage of bone and liver.  Her last methotrexate dose was 8/7.  She is additionally taking supplements (including collagen, magnesium, tumeric) and has been meditating.  She received her third dose of the covid vaccine this month (with fatigue and achyness).  She has not been using flexeril (it has made her feel groggy upon awakening)\par \par October 22, 2021  PtGA 1.0\par f/u SLE continuing off methotrexate, with minimal signs/symptoms of "active SLE", i.e. no rash, alopecia (hair remains thin but is not falling out), joint pains/swelling, although she is beginning to note the onset of mild stiffness since off of methotrexate.  Fatigue is about the same (Still with hot flashes have resulted in poor sleep)..  No jaudice, fever, rash, anorexia, weight loss, lymphadenopathy, chills, oral ulcers, dry eyes, chest pain, shortness of breath, cough, anosmia, ageusia, nausea, vomiting, diarrhea, abdominal pain. Since last seen, she has had an abdominal ultrasound showing a fatty liver as well as a hypodense non-vascular intrahepatic structure with recommendation for a MRI with and without contrast.  The MRI showed multiple subcentimeter hepatic cysts as well as a 1.6x1.3 lesion, most likely a focal nodular hyperplasia and a second similarly appearing lesion in the subcapsular region of the right hepatic lobe.  I discussed these findings with the radiologist who recommended monitoring in 3-6 months.  The transaminases have returned to normal, her alk phos is lower and her ESR remains elevated.doing well (and now off methotrexate). \par \par November 15, 2021 PtGA 1.4\par f/u SLE--she saw her pcp last week (Nov 8) and had blood obtained for HgA1c, lipids, thyroid as well as cbc and chem.  She had a painful right occipital lymphnode (last week) which is now not tender and ?diminishing.  The AST from the PCP's labs is minimally elevated, and her ALK phos is greater than previously.  She has no abdominal symptoms.  She has no joint pain or swelling but stiffness is persisting; she additionally notes a small palatal ulcer for ~1 week.  No fever, rash, anorexia, unintentional weight loss (she is dieting and losing weight slowly), alopecia, chills, nasal ulcers, dry eyes, chest pain, shortness of breath, cough, anosmia, ageusia, nausea, vomiting, diarrhea, abdominal pain. She has an appointment with Dr Bar (hepatology) on Dec 6.\par  \par Dec 13, 2021  PtGA  2.2\par F/u SLE--notes increased stiffness in small joints of her hands, additionally with right trochanteric bursa tenderness with difficulty lieing on her right side and with pain extending down lateral leg. The palatal ulcer and occipital (rt) lymph node seen last month have resolved. Otherwise, her symptoms are unchanged without fever, rash, anorexia, weight loss, alopecia, lymphadenopathy, chills, joint pain/swelling/stiffness, oral ulcers, fatigue, chest pain, shortness of breath, cough, nausea, vomiting, diarrhea, abdominal pain. She saw Dr Bar (hepatoloty) last week who has initiated a work-up which includes a liver biopsy (scheduled on 12/28) as well as MRI focusing on the biliary ducts (?PBC).  Of note, he raises the possibility that PBC may be related to her previous long term use of estrogen.  She additionally adds that she was asked if she has pruritus and said no, but that she responded "incorrectly" as she has mild pruritus of her lower extremities at night.\par \par Jan 19, 2022 PtGA 0.6\par f/u SLE--has had mild stiffness in her hands, otherwise without fever, rash, anorexia, weight loss, alopecia, lymphadenopathy, chills, joint pain/swelling/stiffness, oral ulcers, fatigue, dry eyes, chest pain, shortness of breath, cough, anosmia, ageusia, nausea, vomiting, diarrhea, abdominal pain. She has had a liver biopsy showing changes most consistent with a drug reaction.  Methotrexate may be a likely culprit although her long-standing birthcontrol (estrogen/progesterone) may have contributed.  Dr Bar is planning to monitor for 3 months.  She was offerred urosodiol (but has decided not to take it).  She was encouraged to lose weight. Of interest, her esr was 46 when checked last week.\par \par Mar 9, 2021 PtGA 1.3\par f/u SLE, continues with stiffness in her hands, lasting almost all days at times, as well as in knees and ankles (rt>left).  She notes that she has been more active as she is trying to lose weight. She has been on a collagen supplement for several months, missed a few days while travelling ~2 weeks ago and noted increased alopecia as well as the development of a malar rash.  No fever, rash, anorexia, weight loss, lymphadenopathy, chills, joint pain/swelling/stiffness, oral ulcers, fatigue, dry eyes, chest pain, shortness of breath, cough, anosmia, ageusia, nausea, vomiting, diarrhea, abdominal pain. No longer on folic acid (stopped when mtx was discontinued).\par \par May 23, 2022 PtGA 1.5\par f/u SLE, continues to feel about the same, i.e. she still notes stiffness in her hands and ankles.  However, there is no fever, rash, anorexia, alopecia, lymphadenopathy, chills, joint pain/swelling/stiffness, oral ulcers, fatigue, dry eyes, chest pain, shortness of breath, cough, anosmia, ageusia, nausea, vomiting, diarrhea, abdominal pain. She has been actively trying to lose weight (and has successfully lost 7 lbs).  She has continued with GI/liver follow-up and has started ursodiol treatment to facilitate recovery of lft abnormalities. Colchicine d/c'd by GI, but no significant clinical change.  Noting right hip/sciatic type pain which is improved upon taking cyclobenzaprine.\par \par July 11, 2022 PtGA 2.2\par f/u SLE-- over the past few weeks has noted reoccurrance of erythematous skin lesions that are similar to the lesion that was biopsied (and was consistent w SLE).  Still with hand achiness, particularly affecting the joints; still with fatigue.  She received the COVID booster on June 31st which was followed by a fever the next day and achiness and malaise 3 days later-- she tested negative for COVID, although her  tested positive.\par She has had pain in her lateral hip (trocanteric bursa) which is lessened by cyclobenzaprine.  She's noticed that her right ankle pain seems to correlate with the "hip" pain. She is considering cucurmin for it's anti-inflammatory effects.  She continues to lose weight slowly; her liver tests are improved on the urosodiol--she has a follow-up appointment with Dr Bar, hepatology next week.  No fever, anorexia, lymphadenopathy, chills, oral ulcers, fatigue, dry eyes, chest pain, shortness of breath, cough, headaches, nausea, vomiting, diarrhea, abdominal pain. \par \par Sept 12, 2022 PtGA 0.4\par f/u SLE, she is here for the ACEi screening visit.  In general, she is feeling better with no ankle stiffness or swelling of her ankles and with decreased (minimal) symptoms of right trocanteric bursa tenderness.  She has been receiving weekly acupuncture.  She continues on ursodiol and notes that she has had alopecia and intermittent pruritic rashes since initiating the drug which she attributes to the medication.  Otherwise, doing well, the previously seen ~1cm annular erynthematous lesion on right shin, pailer lesion on left shin and a hyperpigmented lesion in mid back have resolved.  She has no fever, rash, anorexia, weight loss, lymphadenopathy, chills, joint pain/swelling/stiffness, oral ulcers, fatigue, dry eyes, chest pain, shortness of breath, cough, anosmia, ageusia, nausea, vomiting, diarrhea, abdominal pain.  \par \par Oct 24, 2022  PtGA 1.5\par f/u SLE-- not able to enroll in ACEi given insufficient uptake on PET scan, now returning for routine visit.  She has been under increased stress with her schedule being "off"-- she was in a hotel for 5 days with change in environment and diet and has missed several recent acupuncture visits.  She notes no hair growth, an oral ulcer on the roof of her mouth, joint stiffness of ankles, knees and with ~1 week right ankle swelling.  No fever, rash, anorexia, weight loss, alopecia, lymphadenopathy, chills, joint pain/swelling/stiffness, oral ulcers, fatigue, dry eyes, chest pain, shortness of breath, cough, nausea, vomiting, diarrhea, abdominal pain. \par \par Jan 11, 2023  PtGA 1.5\par f/u SLE, noted increased symptoms starting in December with pain and stiffness in right mcp's, pip's, elbow and ankle. Additionally noted malar rash. Her symptoms have improved but she still has significant stiffness for several hours. She saw hepatology and advised that she can use ibuprofen prn, and that the likelihood of colchicine contributing to the etiology of her elevated transaminases is low. (Of interest, her oral collagen supplementation may be playing a role). She has been receiving acupuncture since early September and notes that her right hip pain has resolved. No fever, rash, anorexia, weight loss, alopecia (continues to be stable since stopping urosodiol), lymphadenopathy, chills, oral ulcers, fatigue, dry eyes, chest pain, shortness of breath, cough, anosmia, ageusia, nausea, vomiting, diarrhea, abdominal pain. \par \par Feb 8, 2023 PtGA 0.3\par f/u SLE, feeling very well; used colchicine daily with relief of her stiffness and joint tenderness.  She had minor GI related symptoms and stopped after 2 weeks, however, her musculoskeletal symptoms continue to be resolved.  She continues with acupuncture which has helped her right hip pain "tremendously", also has helped her right lower back/paraspinal pain (which may have orginiated from "protecting" the right hip as she moved and walked).  No fever, anorexia, weight loss, alopecia, lymphadenopathy, chills, joint pain/swelling/stiffness, oral ulcers, fatigue, dry eyes, chest pain, shortness of breath, cough, anosmia, ageusia, nausea, vomiting, diarrhea, abdominal pain.  Her labs were checked by GI on Jan 31st and her GGT and AlkP although elevated are declining; her ESR has risen slightly from last month but is still lower than previously.  She has had facial erythema.  Previously, was not clear if her alopecia and pruritic macular rash are consistent with urosodiol.  \par \par April 26, 2026  0.5\par f/u SLE, states that she took colchicine for 2 weeks and that her ankles and hip are better; she continues to have stiffness (not sure how long it is at its worse, but stiffness continues during the day). She continues with acupunture and attributes her reduction in musculoskeletal symptoms to this intervention; she has not even needed to use voltaren gel.  No fever, rash, anorexia, weight loss, alopecia, lymphadenopathy, chills, joint pain/swelling/stiffness, oral ulcers, fatigue, dry eyes, chest pain, shortness of breath, cough, headache, nausea, vomiting, diarrhea, abdominal pain. She saw hepatology last week and had bloods drawn-- her ALT, AST continue to be normal and her ALP and GGT have increased.  She will attempt to lose weight (25lbs) over next 6 months as this may be contributing to her abnormal liver function.\par \par July 12, 2023 PtGA 0.5\par f/u SLE, feels very well, with increased activity (she has bought a [employer]) and watching her diet, she has lost 9 lbs since her last visit.  She continues with acupuncture.  Her joint stiffness has improved significantly and her ankles are additionally better-- she is no longer on colchicine and notes no pain in her hips.  She has had no fever, rash, anorexia, weight loss, alopecia, lymphadenopathy, chills, joint pain/swelling/stiffness, oral ulcers, fatigue, dry eyes, chest pain, shortness of breath, cough, anosmia, ageusia, nausea, vomiting, diarrhea, abdominal pain. She is scheduled to see hepatology in October (if no significant change in her LFTs she will likely receive another liver biopsy).  \par \par Meds: Plaquenil 400mg (last optho exam Jan 2023), cyclobenzaprine 5mg po prn\par \par PE:  118/77  231\par  Skin: she has mild erythema on her arms with tan lines, no malar rash; hair thin (but unchanged), no tenderness, no synovitis (previous fullness of right 2nd mcp and left 3rd mcp, tender and full right ankle, tenderness of knees not present).  No lymphadenopathy, periungal erythema, or edema.  Lungs: Clear, Cor: RRR w/o m/r/g, Abd: soft, NT, BS+, Ext: no CCE, Neuro-gait normal\par \par Imp\par SLE --Stable clinical disease; will check labs including GGT.   Will continue plaquenil.,\par \par COVID-Pt is s/p vaccination (full) with high antibody titers.   s/p second booster, and, s/p likely infection; received the bivalent booster on 9/18/22\par HM:  She received the influenza vaccine 9/18/22

## 2023-07-13 LAB
APPEARANCE: CLEAR
BILIRUBIN URINE: NEGATIVE
BLOOD URINE: NEGATIVE
C3 SERPL-MCNC: 154 MG/DL
C4 SERPL-MCNC: 26 MG/DL
COLOR: YELLOW
CYTOLOGY CVX/VAG DOC THIN PREP: NORMAL
DSDNA AB SER-ACNC: <12 IU/ML
ERYTHROCYTE [SEDIMENTATION RATE] IN BLOOD BY WESTERGREN METHOD: 68 MM/HR
GLUCOSE QUALITATIVE U: NEGATIVE MG/DL
KETONES URINE: NEGATIVE MG/DL
LEUKOCYTE ESTERASE URINE: NEGATIVE
NITRITE URINE: NEGATIVE
PH URINE: 7
PROTEIN URINE: NEGATIVE MG/DL
SPECIFIC GRAVITY URINE: 1.01
UROBILINOGEN URINE: 0.2 MG/DL

## 2023-10-11 ENCOUNTER — APPOINTMENT (OUTPATIENT)
Dept: RHEUMATOLOGY | Facility: CLINIC | Age: 53
End: 2023-10-11

## 2023-10-11 ENCOUNTER — LABORATORY RESULT (OUTPATIENT)
Age: 53
End: 2023-10-11

## 2023-10-11 VITALS
BODY MASS INDEX: 42.25 KG/M2 | WEIGHT: 231 LBS | HEART RATE: 74 BPM | TEMPERATURE: 97.8 F | OXYGEN SATURATION: 97 % | DIASTOLIC BLOOD PRESSURE: 77 MMHG | SYSTOLIC BLOOD PRESSURE: 110 MMHG | RESPIRATION RATE: 16 BRPM

## 2023-10-12 LAB
ALBUMIN SERPL ELPH-MCNC: 4.3 G/DL
ALP BLD-CCNC: 176 U/L
ALT SERPL-CCNC: 25 U/L
ANION GAP SERPL CALC-SCNC: 17 MMOL/L
APPEARANCE: ABNORMAL
AST SERPL-CCNC: 29 U/L
BILIRUB SERPL-MCNC: 0.2 MG/DL
BILIRUBIN URINE: NEGATIVE
BLOOD URINE: NEGATIVE
BUN SERPL-MCNC: 16 MG/DL
C3 SERPL-MCNC: 160 MG/DL
C4 SERPL-MCNC: 26 MG/DL
CALCIUM SERPL-MCNC: 9.2 MG/DL
CHLORIDE SERPL-SCNC: 99 MMOL/L
CO2 SERPL-SCNC: 25 MMOL/L
COLOR: YELLOW
CREAT SERPL-MCNC: 0.69 MG/DL
EGFR: 103 ML/MIN/1.73M2
ERYTHROCYTE [SEDIMENTATION RATE] IN BLOOD BY WESTERGREN METHOD: 60 MM/HR
GGT SERPL-CCNC: 78 U/L
GLUCOSE QUALITATIVE U: NEGATIVE MG/DL
GLUCOSE SERPL-MCNC: 60 MG/DL
HCT VFR BLD CALC: 40.6 %
HGB BLD-MCNC: 12.5 G/DL
KETONES URINE: NEGATIVE MG/DL
LEUKOCYTE ESTERASE URINE: NEGATIVE
MCHC RBC-ENTMCNC: 28.2 PG
MCHC RBC-ENTMCNC: 30.8 GM/DL
MCV RBC AUTO: 91.4 FL
NITRITE URINE: NEGATIVE
PH URINE: 7.5
PLATELET # BLD AUTO: 290 K/UL
POTASSIUM SERPL-SCNC: 4.6 MMOL/L
PROT SERPL-MCNC: 7.3 G/DL
PROTEIN URINE: NEGATIVE MG/DL
RBC # BLD: 4.44 M/UL
RBC # FLD: 14 %
SODIUM SERPL-SCNC: 141 MMOL/L
SPECIFIC GRAVITY URINE: 1.01
UROBILINOGEN URINE: 0.2 MG/DL
WBC # FLD AUTO: 5.64 K/UL

## 2023-10-13 LAB — DSDNA AB SER-ACNC: <12 IU/ML

## 2023-10-16 ENCOUNTER — APPOINTMENT (OUTPATIENT)
Dept: HEPATOLOGY | Facility: CLINIC | Age: 53
End: 2023-10-16
Payer: COMMERCIAL

## 2023-10-16 VITALS
OXYGEN SATURATION: 100 % | SYSTOLIC BLOOD PRESSURE: 129 MMHG | DIASTOLIC BLOOD PRESSURE: 82 MMHG | BODY MASS INDEX: 42.95 KG/M2 | WEIGHT: 233.38 LBS | HEART RATE: 56 BPM | HEIGHT: 62 IN | TEMPERATURE: 98.2 F

## 2023-10-16 DIAGNOSIS — K76.0 FATTY (CHANGE OF) LIVER, NOT ELSEWHERE CLASSIFIED: ICD-10-CM

## 2023-10-16 DIAGNOSIS — K75.89 OTHER SPECIFIED INFLAMMATORY LIVER DISEASES: ICD-10-CM

## 2023-10-16 DIAGNOSIS — R74.8 ABNORMAL LEVELS OF OTHER SERUM ENZYMES: ICD-10-CM

## 2023-10-16 PROCEDURE — 99214 OFFICE O/P EST MOD 30 MIN: CPT

## 2023-10-16 PROCEDURE — 76981 USE PARENCHYMA: CPT

## 2023-10-23 PROBLEM — K76.0 NAFLD (NONALCOHOLIC FATTY LIVER DISEASE): Status: ACTIVE | Noted: 2023-01-05

## 2024-01-08 ENCOUNTER — APPOINTMENT (OUTPATIENT)
Dept: RHEUMATOLOGY | Facility: CLINIC | Age: 54
End: 2024-01-08

## 2024-01-08 VITALS
SYSTOLIC BLOOD PRESSURE: 124 MMHG | WEIGHT: 235 LBS | DIASTOLIC BLOOD PRESSURE: 78 MMHG | OXYGEN SATURATION: 97 % | BODY MASS INDEX: 42.98 KG/M2 | RESPIRATION RATE: 16 BRPM | HEART RATE: 74 BPM | TEMPERATURE: 97.8 F

## 2024-01-08 NOTE — PHYSICAL EXAM
[General Appearance - Alert] : alert [General Appearance - Well Nourished] : well nourished [General Appearance - Well Developed] : well developed [Extraocular Movements] : extraocular movements were intact [Outer Ear] : the ears and nose were normal in appearance [Examination Of The Oral Cavity] : the lips and gums were normal [Nasal Cavity] : the nasal mucosa and septum were normal [Neck Appearance] : the appearance of the neck was normal [] : no respiratory distress [Exaggerated Use Of Accessory Muscles For Inspiration] : no accessory muscle use [Auscultation Breath Sounds / Voice Sounds] : lungs were clear to auscultation bilaterally [Heart Rate And Rhythm] : heart rate was normal and rhythm regular [Heart Sounds] : normal S1 and S2 [Heart Sounds Gallop] : no gallops [Murmurs] : no murmurs [Heart Sounds Pericardial Friction Rub] : no pericardial rub [Bowel Sounds] : normal bowel sounds [Abdomen Soft] : soft [Abdomen Tenderness] : non-tender [Cervical Lymph Nodes Enlarged Posterior Bilaterally] : posterior cervical [Cervical Lymph Nodes Enlarged Anterior Bilaterally] : anterior cervical [Supraclavicular Lymph Nodes Enlarged Bilaterally] : supraclavicular [No CVA Tenderness] : no ~M costovertebral angle tenderness [No Spinal Tenderness] : no spinal tenderness [Abnormal Walk] : normal gait [Nail Clubbing] : no clubbing  or cyanosis of the fingernails [Motor Tone] : muscle strength and tone were normal [Skin Color & Pigmentation] : normal skin color and pigmentation [FreeTextEntry1] : see hpi [No Focal Deficits] : no focal deficits

## 2024-01-08 NOTE — HISTORY OF PRESENT ILLNESS
[FreeTextEntry1] : 52 year old female with SLE diagnosed 2016 with features that include malar rash, oral ulcers, arthritis, pericarditis, DNA+, alopecia, fever.  Of note, she is ROSE-.  Her serologies include - Sm/RNP/Ro/La/aPL; she has never been hypocomplementemic.  Her ESR has been persistently elevated. She has had a skin biopsy with interface dermatitis; her most pressing complaints have been of joint pain and stiffness, malar rash, fatigue.  She is historically anti-Ro negative (which can be associated with SjS and PBC).   Febrary 8, 2021 f/u SLE, on Methotrexate 12.5mg x4 weeks.  She continues on plaquenil and colchicine; MMF was tapered over 3 weeks since her last visit on January 12.  She notes marked improvement and has no pain.  Her previous stiffness has lessoned to ~1 hour (and there have been recent days when she had no stiffness).  She additionally feels markedly more energetic.  There is increased alopecia (?medication related), no rash, nausea, vomiting, abdominal pain, edema, ulcers, lymphadenopathy, fever, anorexia, weight loss, chest pain, shortness of breath, nausea, vomiting, edema, anosmia or ageusia.  She had a UTI the week of Jan 25--received antibiotics from pcp; also with tooth extraction 2/3 (and given amox for 5 days). She notes dryness of her skin (particularly of her hands)  April 19, 2019  PtGA 1.1 f/u SLE; continues to feel stiff over this past month with occasional swelling of left ankle. However, feels that she may be slightly improved compared to last visit. Methotrexate increased last visit from 12.5 to 15mg/week. Has had nausea following methotrexate on one of her recent doses. Notes that her dip's have developed swelling and cysts (which was exacerbated post-vaccine). Of note,she has a family history of hand oa in females. States that hair continues to be thin. No rash, alopecia, fever, anorexia, weight loss, lymphadenopathy, fatigue, dry eyes, chest pain, shortness of breath, cough, anosmia, ageusia, nausea, vomiting, diarrhea, abdominal pain. She will be visiting her sons in [place] for a college graduation, both sons have received the first dose of the Moderna vaccine.  May 17, 2021  PtGA 1.2 f/u SLE, feels well--has been taking increased MTX (17.5) for 3 weeks.  Feels fatigue the following day.  Doing well until a few days ago when she notes some facial erythema and some arthralgia of ankles and elbows.  No am stiffness or joint swelling. Noted an oral ulcer on palatte ~2 weeks ago. Went to [place] for sons' graduation. Otherwise, no fever, other rash, headache, anorexia, weight loss, alopecia (hair remains thin), lymphadenopathy, chills, jdry eyes, chest pain, shortness of breath, cough, anosmia, ageusia, nausea, vomiting, diarrhea, abdominal pain.  Of note, ESR decrease to 43 last visit.  June 28, 2021 PtGA 0.3 f/u SLE--feeling very well on MTX 17.5.  She has tolerated the dose without nausea for the last 2 weeks by holding the colchicine and plaquenil on the day she takes her methotrexae. She was taken off her oral contraeptive by gyn ~4 weeks ago--she had one break-through bleeding (followed by a UTI) for which she received macrobid x 4 days.  Her joints have been "really good" with resolution off tenderness of  right 3rd MCP and elbow; but persistent (but less) mild pain of ankles, there has been no swelling, warmth or stiffness.  She has noticed erythema of her face (she has not used any steroid cream)--the previously seen exematous rash on dorsum of bilateral hands has cleared with steroid cream application.  No fever, rash, anorexia, weight loss, alopecia, lymphadenopathy, chills, oral ulcers (previous oral ulcer has resolved), fatigue, dry eyes, chest pain, shortness of breath, cough, anosmia, ageusia, nausea, vomiting, diarrhea, abdominal pain.   Aug 9, 2021 PtGA 2.0 f/u SLE, was doing well until 2 weeks ago when she noted onset of stiffness and pain in bilateral wrists and shoulders.  She did not have associated swelling in these joints, nor in her ankles (previously affected).  She states that she has been experiencing many hot flashes since the discontinuation of her estrogen and is sleeping poorly.  Her previous fatigue has returned.  Alopecia continues but no rash (she has used steroid cream intermittently, fever, anorexia, weight loss, lymphadenopathy, chills, oral ulcers, dry eyes, chest pain, shortness of breath, cough, anosmia, ageusia, nausea, vomiting, diarrhea, abdominal pain. She notes that she is fatigued most of Sunday (she takes her methotrexate on Saturday night).  Sept 20, 2021 PtGA 1.8 f/u SLE; doing well with marked improvement of joint pains, stiffness, alopecia.  No longer with paresthesias down her legs.She still has pains in joints affected by OA. Still with fatigue. Her labs showed increased liver enzymes including GGT; her ALK phos was fractionated with a "normal" percentage of bone and liver.  Her last methotrexate dose was 8/7.  She is additionally taking supplements (including collagen, magnesium, tumeric) and has been meditating.  She received her third dose of the covid vaccine this month (with fatigue and achyness).  She has not been using flexeril (it has made her feel groggy upon awakening)  October 22, 2021  PtGA 1.0 f/u SLE continuing off methotrexate, with minimal signs/symptoms of "active SLE", i.e. no rash, alopecia (hair remains thin but is not falling out), joint pains/swelling, although she is beginning to note the onset of mild stiffness since off of methotrexate.  Fatigue is about the same (Still with hot flashes have resulted in poor sleep)..  No jaudice, fever, rash, anorexia, weight loss, lymphadenopathy, chills, oral ulcers, dry eyes, chest pain, shortness of breath, cough, anosmia, ageusia, nausea, vomiting, diarrhea, abdominal pain. Since last seen, she has had an abdominal ultrasound showing a fatty liver as well as a hypodense non-vascular intrahepatic structure with recommendation for a MRI with and without contrast.  The MRI showed multiple subcentimeter hepatic cysts as well as a 1.6x1.3 lesion, most likely a focal nodular hyperplasia and a second similarly appearing lesion in the subcapsular region of the right hepatic lobe.  I discussed these findings with the radiologist who recommended monitoring in 3-6 months.  The transaminases have returned to normal, her alk phos is lower and her ESR remains elevated.doing well (and now off methotrexate).   November 15, 2021 PtGA 1.4 f/u SLE--she saw her pcp last week (Nov 8) and had blood obtained for HgA1c, lipids, thyroid as well as cbc and chem.  She had a painful right occipital lymphnode (last week) which is now not tender and ?diminishing.  The AST from the PCP's labs is minimally elevated, and her ALK phos is greater than previously.  She has no abdominal symptoms.  She has no joint pain or swelling but stiffness is persisting; she additionally notes a small palatal ulcer for ~1 week.  No fever, rash, anorexia, unintentional weight loss (she is dieting and losing weight slowly), alopecia, chills, nasal ulcers, dry eyes, chest pain, shortness of breath, cough, anosmia, ageusia, nausea, vomiting, diarrhea, abdominal pain. She has an appointment with Dr Bar (hepatology) on Dec 6.   Dec 13, 2021  PtGA  2.2 F/u SLE--notes increased stiffness in small joints of her hands, additionally with right trochanteric bursa tenderness with difficulty lieing on her right side and with pain extending down lateral leg. The palatal ulcer and occipital (rt) lymph node seen last month have resolved. Otherwise, her symptoms are unchanged without fever, rash, anorexia, weight loss, alopecia, lymphadenopathy, chills, joint pain/swelling/stiffness, oral ulcers, fatigue, chest pain, shortness of breath, cough, nausea, vomiting, diarrhea, abdominal pain. She saw Dr Bar (hepatoloty) last week who has initiated a work-up which includes a liver biopsy (scheduled on 12/28) as well as MRI focusing on the biliary ducts (?PBC).  Of note, he raises the possibility that PBC may be related to her previous long term use of estrogen.  She additionally adds that she was asked if she has pruritus and said no, but that she responded "incorrectly" as she has mild pruritus of her lower extremities at night.  Jan 19, 2022 PtGA 0.6 f/u SLE--has had mild stiffness in her hands, otherwise without fever, rash, anorexia, weight loss, alopecia, lymphadenopathy, chills, joint pain/swelling/stiffness, oral ulcers, fatigue, dry eyes, chest pain, shortness of breath, cough, anosmia, ageusia, nausea, vomiting, diarrhea, abdominal pain. She has had a liver biopsy showing changes most consistent with a drug reaction.  Methotrexate may be a likely culprit although her long-standing birthcontrol (estrogen/progesterone) may have contributed.  Dr Bar is planning to monitor for 3 months.  She was offerred urosodiol (but has decided not to take it).  She was encouraged to lose weight. Of interest, her esr was 46 when checked last week.  Mar 9, 2021 PtGA 1.3 f/u SLE, continues with stiffness in her hands, lasting almost all days at times, as well as in knees and ankles (rt>left).  She notes that she has been more active as she is trying to lose weight. She has been on a collagen supplement for several months, missed a few days while travelling ~2 weeks ago and noted increased alopecia as well as the development of a malar rash.  No fever, rash, anorexia, weight loss, lymphadenopathy, chills, joint pain/swelling/stiffness, oral ulcers, fatigue, dry eyes, chest pain, shortness of breath, cough, anosmia, ageusia, nausea, vomiting, diarrhea, abdominal pain. No longer on folic acid (stopped when mtx was discontinued).  May 23, 2022 PtGA 1.5 f/u SLE, continues to feel about the same, i.e. she still notes stiffness in her hands and ankles.  However, there is no fever, rash, anorexia, alopecia, lymphadenopathy, chills, joint pain/swelling/stiffness, oral ulcers, fatigue, dry eyes, chest pain, shortness of breath, cough, anosmia, ageusia, nausea, vomiting, diarrhea, abdominal pain. She has been actively trying to lose weight (and has successfully lost 7 lbs).  She has continued with GI/liver follow-up and has started ursodiol treatment to facilitate recovery of lft abnormalities. Colchicine d/c'd by GI, but no significant clinical change.  Noting right hip/sciatic type pain which is improved upon taking cyclobenzaprine.  July 11, 2022 PtGA 2.2 f/u SLE-- over the past few weeks has noted reoccurrance of erythematous skin lesions that are similar to the lesion that was biopsied (and was consistent w SLE).  Still with hand achiness, particularly affecting the joints; still with fatigue.  She received the COVID booster on June 31st which was followed by a fever the next day and achiness and malaise 3 days later-- she tested negative for COVID, although her  tested positive. She has had pain in her lateral hip (trocanteric bursa) which is lessened by cyclobenzaprine.  She's noticed that her right ankle pain seems to correlate with the "hip" pain. She is considering cucurmin for it's anti-inflammatory effects.  She continues to lose weight slowly; her liver tests are improved on the urosodiol--she has a follow-up appointment with Dr Bar, hepatology next week.  No fever, anorexia, lymphadenopathy, chills, oral ulcers, fatigue, dry eyes, chest pain, shortness of breath, cough, headaches, nausea, vomiting, diarrhea, abdominal pain.   Sept 12, 2022 PtGA 0.4 f/u SLE, she is here for the ACEi screening visit.  In general, she is feeling better with no ankle stiffness or swelling of her ankles and with decreased (minimal) symptoms of right trocanteric bursa tenderness.  She has been receiving weekly acupuncture.  She continues on ursodiol and notes that she has had alopecia and intermittent pruritic rashes since initiating the drug which she attributes to the medication.  Otherwise, doing well, the previously seen ~1cm annular erynthematous lesion on right shin, pailer lesion on left shin and a hyperpigmented lesion in mid back have resolved.  She has no fever, rash, anorexia, weight loss, lymphadenopathy, chills, joint pain/swelling/stiffness, oral ulcers, fatigue, dry eyes, chest pain, shortness of breath, cough, anosmia, ageusia, nausea, vomiting, diarrhea, abdominal pain.    Oct 24, 2022  PtGA 1.5 f/u SLE-- not able to enroll in ACEi given insufficient uptake on PET scan, now returning for routine visit.  She has been under increased stress with her schedule being "off"-- she was in a hotel for 5 days with change in environment and diet and has missed several recent acupuncture visits.  She notes no hair growth, an oral ulcer on the roof of her mouth, joint stiffness of ankles, knees and with ~1 week right ankle swelling.  No fever, rash, anorexia, weight loss, alopecia, lymphadenopathy, chills, joint pain/swelling/stiffness, oral ulcers, fatigue, dry eyes, chest pain, shortness of breath, cough, nausea, vomiting, diarrhea, abdominal pain.   Jan 11, 2023  PtGA 1.5 f/u SLE, noted increased symptoms starting in December with pain and stiffness in right mcp's, pip's, elbow and ankle. Additionally noted malar rash. Her symptoms have improved but she still has significant stiffness for several hours. She saw hepatology and advised that she can use ibuprofen prn, and that the likelihood of colchicine contributing to the etiology of her elevated transaminases is low. (Of interest, her oral collagen supplementation may be playing a role). She has been receiving acupuncture since early September and notes that her right hip pain has resolved. No fever, rash, anorexia, weight loss, alopecia (continues to be stable since stopping urosodiol), lymphadenopathy, chills, oral ulcers, fatigue, dry eyes, chest pain, shortness of breath, cough, anosmia, ageusia, nausea, vomiting, diarrhea, abdominal pain.   Feb 8, 2023 PtGA 0.3 f/u SLE, feeling very well; used colchicine daily with relief of her stiffness and joint tenderness.  She had minor GI related symptoms and stopped after 2 weeks, however, her musculoskeletal symptoms continue to be resolved.  She continues with acupuncture which has helped her right hip pain "tremendously", also has helped her right lower back/paraspinal pain (which may have orginiated from "protecting" the right hip as she moved and walked).  No fever, anorexia, weight loss, alopecia, lymphadenopathy, chills, joint pain/swelling/stiffness, oral ulcers, fatigue, dry eyes, chest pain, shortness of breath, cough, anosmia, ageusia, nausea, vomiting, diarrhea, abdominal pain.  Her labs were checked by GI on Jan 31st and her GGT and AlkP although elevated are declining; her ESR has risen slightly from last month but is still lower than previously.  She has had facial erythema.  Previously, was not clear if her alopecia and pruritic macular rash are consistent with urosodiol.    April 26, 2026  0.5 f/u SLE, states that she took colchicine for 2 weeks and that her ankles and hip are better; she continues to have stiffness (not sure how long it is at its worse, but stiffness continues during the day). She continues with acupunture and attributes her reduction in musculoskeletal symptoms to this intervention; she has not even needed to use voltaren gel.  No fever, rash, anorexia, weight loss, alopecia, lymphadenopathy, chills, joint pain/swelling/stiffness, oral ulcers, fatigue, dry eyes, chest pain, shortness of breath, cough, headache, nausea, vomiting, diarrhea, abdominal pain. She saw hepatology last week and had bloods drawn-- her ALT, AST continue to be normal and her ALP and GGT have increased.  She will attempt to lose weight (25lbs) over next 6 months as this may be contributing to her abnormal liver function.  July 12, 2023 PtGA 0.5 f/u SLE, feels very well, with increased activity (she has bought a [employer]) and watching her diet, she has lost 9 lbs since her last visit.  She continues with acupuncture.  Her joint stiffness has improved significantly and her ankles are additionally better-- she is no longer on colchicine and notes no pain in her hips.  She has had no fever, rash, anorexia, weight loss, alopecia, lymphadenopathy, chills, joint pain/swelling/stiffness, oral ulcers, fatigue, dry eyes, chest pain, shortness of breath, cough, anosmia, ageusia, nausea, vomiting, diarrhea, abdominal pain. She is scheduled to see hepatology in October (if no significant change in her LFTs she will likely receive another liver biopsy).   Oct 11, 2023 PtGA 0.5 f/u SLE, feeling well without fever, rash, anorexia, weight loss, alopecia, lymphadenopathy, chills, joint pain/swelling/stiffness, oral ulcers, fatigue, dry eyes, chest pain, shortness of breath, cough, headache, nausea, vomiting, diarrhea, abdominal pain. Of note, she has not lost significant weight (she has been traveling and therefore eating out).  She has an appointment with hepatology later this month.  January 8, 2024 PtGA 1.1 f/u SLE, recently returned from trip to [place]; while there she noted erythema of arms and legs (despite wearing long sleeved tops and pants); she has also had a palatal ulcer (lasting ~1 week which resolved a week ago.  While away, she was standing for prolonged periods of time and her right knee became tender and swollen.  It has improved with use of diclofenac gel (as well as capsacin) and prn advil.  She has not had fever, anorexia, weight loss, alopecia, lymphadenopathy, chills, fatigue, dry eyes, chest pain, cough, nausea, vomiting, diarrhea, abdominal pain.   Meds: Plaquenil 400mg (last optho exam Jan 2023), cyclobenzaprine 5mg po prn  PE:  124/78  235 NAD-- baslicaly unchanged:  Skin: she has mild erythema on her arms with tan lines, no malar rash; hair thin (but unchanged), tenderness of rt knee with fullness, no synovitis (previous fullness of right 2nd mcp and left 3rd mcp, tender and full right ankle).  No lymphadenopathy, periungal erythema, or edema.  Lungs: Clear, Cor: RRR w/o m/r/g, Abd: soft, NT, BS+, Ext: no CCE, Neuro-gait normal  Imp SLE --Uncertan if there is a significant increase in her clinical disease activity-- cutaneous erythema is somewhat worse, but is improving;  her right knee pain/swelling is improving and is mechanical; will check labs including GGT.   Will continue plaquenil.,

## 2024-01-09 LAB
ALBUMIN SERPL ELPH-MCNC: 4.4 G/DL
ALP BLD-CCNC: 200 U/L
ALT SERPL-CCNC: 28 U/L
ANION GAP SERPL CALC-SCNC: 15 MMOL/L
APPEARANCE: CLEAR
AST SERPL-CCNC: 30 U/L
BASOPHILS # BLD AUTO: 0.06 K/UL
BASOPHILS NFR BLD AUTO: 1.1 %
BILIRUB SERPL-MCNC: 0.3 MG/DL
BILIRUBIN URINE: NEGATIVE
BLOOD URINE: NEGATIVE
BUN SERPL-MCNC: 17 MG/DL
C3 SERPL-MCNC: 168 MG/DL
C4 SERPL-MCNC: 28 MG/DL
CALCIUM SERPL-MCNC: 9.6 MG/DL
CHLORIDE SERPL-SCNC: 98 MMOL/L
CO2 SERPL-SCNC: 27 MMOL/L
COLOR: YELLOW
CREAT SERPL-MCNC: 0.65 MG/DL
DSDNA AB SER-ACNC: <12 IU/ML
EGFR: 104 ML/MIN/1.73M2
EOSINOPHIL # BLD AUTO: 0.26 K/UL
EOSINOPHIL NFR BLD AUTO: 4.9 %
ERYTHROCYTE [SEDIMENTATION RATE] IN BLOOD BY WESTERGREN METHOD: 76 MM/HR
GGT SERPL-CCNC: 92 U/L
GLUCOSE QUALITATIVE U: NEGATIVE MG/DL
GLUCOSE SERPL-MCNC: 37 MG/DL
HCT VFR BLD CALC: 39.8 %
HGB BLD-MCNC: 12.3 G/DL
IMM GRANULOCYTES NFR BLD AUTO: 0.4 %
KETONES URINE: NEGATIVE MG/DL
LEUKOCYTE ESTERASE URINE: NEGATIVE
LYMPHOCYTES # BLD AUTO: 1.65 K/UL
LYMPHOCYTES NFR BLD AUTO: 31.2 %
MAN DIFF?: NORMAL
MCHC RBC-ENTMCNC: 27.4 PG
MCHC RBC-ENTMCNC: 30.9 GM/DL
MCV RBC AUTO: 88.6 FL
MONOCYTES # BLD AUTO: 0.44 K/UL
MONOCYTES NFR BLD AUTO: 8.3 %
NEUTROPHILS # BLD AUTO: 2.86 K/UL
NEUTROPHILS NFR BLD AUTO: 54.1 %
NITRITE URINE: NEGATIVE
PH URINE: 7.5
PLATELET # BLD AUTO: 280 K/UL
POTASSIUM SERPL-SCNC: 4.6 MMOL/L
PROT SERPL-MCNC: 7.1 G/DL
PROTEIN URINE: NEGATIVE MG/DL
RBC # BLD: 4.49 M/UL
RBC # FLD: 13.8 %
SODIUM SERPL-SCNC: 140 MMOL/L
SPECIFIC GRAVITY URINE: 1.01
UROBILINOGEN URINE: 0.2 MG/DL
WBC # FLD AUTO: 5.29 K/UL

## 2024-01-10 ENCOUNTER — TRANSCRIPTION ENCOUNTER (OUTPATIENT)
Age: 54
End: 2024-01-10

## 2024-01-12 RX ORDER — PREDNISONE 5 MG/1
5 TABLET ORAL DAILY
Qty: 30 | Refills: 0 | Status: ACTIVE | COMMUNITY
Start: 2022-03-22 | End: 1900-01-01

## 2024-01-16 ENCOUNTER — TRANSCRIPTION ENCOUNTER (OUTPATIENT)
Age: 54
End: 2024-01-16

## 2024-01-18 ENCOUNTER — TRANSCRIPTION ENCOUNTER (OUTPATIENT)
Age: 54
End: 2024-01-18

## 2024-01-26 ENCOUNTER — NON-APPOINTMENT (OUTPATIENT)
Age: 54
End: 2024-01-26

## 2024-02-09 ENCOUNTER — APPOINTMENT (OUTPATIENT)
Dept: RHEUMATOLOGY | Facility: CLINIC | Age: 54
End: 2024-02-09

## 2024-03-11 ENCOUNTER — APPOINTMENT (OUTPATIENT)
Dept: RHEUMATOLOGY | Facility: CLINIC | Age: 54
End: 2024-03-11

## 2024-03-11 ENCOUNTER — LABORATORY RESULT (OUTPATIENT)
Age: 54
End: 2024-03-11

## 2024-03-11 VITALS
RESPIRATION RATE: 16 BRPM | TEMPERATURE: 97.7 F | BODY MASS INDEX: 44.45 KG/M2 | DIASTOLIC BLOOD PRESSURE: 85 MMHG | WEIGHT: 243 LBS | SYSTOLIC BLOOD PRESSURE: 132 MMHG | OXYGEN SATURATION: 97 % | HEART RATE: 74 BPM

## 2024-03-11 DIAGNOSIS — Z00.00 ENCOUNTER FOR GENERAL ADULT MEDICAL EXAMINATION W/OUT ABNORMAL FINDINGS: ICD-10-CM

## 2024-03-11 LAB
ALBUMIN SERPL ELPH-MCNC: 4.4 G/DL
ALP BLD-CCNC: 175 U/L
ALT SERPL-CCNC: 27 U/L
ANION GAP SERPL CALC-SCNC: 14 MMOL/L
APPEARANCE: CLEAR
AST SERPL-CCNC: 25 U/L
BASOPHILS # BLD AUTO: 0 K/UL
BASOPHILS NFR BLD AUTO: 0 %
BILIRUB SERPL-MCNC: 0.2 MG/DL
BILIRUBIN URINE: NEGATIVE
BLOOD URINE: NEGATIVE
BUN SERPL-MCNC: 18 MG/DL
CALCIUM SERPL-MCNC: 9.3 MG/DL
CHLORIDE SERPL-SCNC: 101 MMOL/L
CHOLEST SERPL-MCNC: 214 MG/DL
CO2 SERPL-SCNC: 26 MMOL/L
COLOR: YELLOW
CREAT SERPL-MCNC: 0.61 MG/DL
EGFR: 106 ML/MIN/1.73M2
EOSINOPHIL # BLD AUTO: 0.26 K/UL
EOSINOPHIL NFR BLD AUTO: 5.8 %
ERYTHROCYTE [SEDIMENTATION RATE] IN BLOOD BY WESTERGREN METHOD: 70 MM/HR
GLUCOSE QUALITATIVE U: NEGATIVE MG/DL
GLUCOSE SERPL-MCNC: 58 MG/DL
HCT VFR BLD CALC: 37.3 %
HDLC SERPL-MCNC: 81 MG/DL
HGB BLD-MCNC: 11.9 G/DL
IMM GRANULOCYTES NFR BLD AUTO: 0.4 %
KETONES URINE: NEGATIVE MG/DL
LDLC SERPL CALC-MCNC: 118 MG/DL
LEUKOCYTE ESTERASE URINE: NEGATIVE
LYMPHOCYTES # BLD AUTO: 1.45 K/UL
LYMPHOCYTES NFR BLD AUTO: 32.6 %
MAN DIFF?: NORMAL
MCHC RBC-ENTMCNC: 28.3 PG
MCHC RBC-ENTMCNC: 31.9 GM/DL
MCV RBC AUTO: 88.8 FL
MONOCYTES # BLD AUTO: 0.47 K/UL
MONOCYTES NFR BLD AUTO: 10.6 %
NEUTROPHILS # BLD AUTO: 2.25 K/UL
NEUTROPHILS NFR BLD AUTO: 50.6 %
NITRITE URINE: NEGATIVE
NONHDLC SERPL-MCNC: 133 MG/DL
PH URINE: 7
PLATELET # BLD AUTO: 231 K/UL
POTASSIUM SERPL-SCNC: 4.6 MMOL/L
PROT SERPL-MCNC: 7.5 G/DL
PROTEIN URINE: NEGATIVE MG/DL
RBC # BLD: 4.2 M/UL
RBC # FLD: 13.8 %
SODIUM SERPL-SCNC: 141 MMOL/L
SPECIFIC GRAVITY URINE: 1.02
TRIGL SERPL-MCNC: 88 MG/DL
UROBILINOGEN URINE: 0.2 MG/DL
WBC # FLD AUTO: 4.45 K/UL

## 2024-03-11 RX ORDER — HYDROXYCHLOROQUINE SULFATE 200 MG/1
200 TABLET, FILM COATED ORAL
Qty: 180 | Refills: 0 | Status: ACTIVE | COMMUNITY
Start: 1900-01-01 | End: 1900-01-01

## 2024-03-11 NOTE — HISTORY OF PRESENT ILLNESS
[FreeTextEntry1] : 53 year old female with SLE diagnosed 2016 with features that include malar rash, oral ulcers, arthritis, pericarditis, DNA+, alopecia, fever.  Of note, she is ROSE-.  Her serologies include - Sm/RNP/Ro/La/aPL; she has never been hypocomplementemic.  Her ESR has been persistently elevated. She has had a skin biopsy with interface dermatitis; her most pressing complaints have been of joint pain and stiffness, malar rash, fatigue.  She is historically anti-Ro negative (which can be associated with SjS and PBC).   Febrary 8, 2021 f/u SLE, on Methotrexate 12.5mg x4 weeks.  She continues on plaquenil and colchicine; MMF was tapered over 3 weeks since her last visit on January 12.  She notes marked improvement and has no pain.  Her previous stiffness has lessoned to ~1 hour (and there have been recent days when she had no stiffness).  She additionally feels markedly more energetic.  There is increased alopecia (?medication related), no rash, nausea, vomiting, abdominal pain, edema, ulcers, lymphadenopathy, fever, anorexia, weight loss, chest pain, shortness of breath, nausea, vomiting, edema, anosmia or ageusia.  She had a UTI the week of Jan 25--received antibiotics from pcp; also with tooth extraction 2/3 (and given amox for 5 days). She notes dryness of her skin (particularly of her hands)  April 19, 2019  PtGA 1.1 f/u SLE; continues to feel stiff over this past month with occasional swelling of left ankle. However, feels that she may be slightly improved compared to last visit. Methotrexate increased last visit from 12.5 to 15mg/week. Has had nausea following methotrexate on one of her recent doses. Notes that her dip's have developed swelling and cysts (which was exacerbated post-vaccine). Of note,she has a family history of hand oa in females. States that hair continues to be thin. No rash, alopecia, fever, anorexia, weight loss, lymphadenopathy, fatigue, dry eyes, chest pain, shortness of breath, cough, anosmia, ageusia, nausea, vomiting, diarrhea, abdominal pain. She will be visiting her sons in [place] for a college graduation, both sons have received the first dose of the Moderna vaccine.  May 17, 2021  PtGA 1.2 f/u SLE, feels well--has been taking increased MTX (17.5) for 3 weeks.  Feels fatigue the following day.  Doing well until a few days ago when she notes some facial erythema and some arthralgia of ankles and elbows.  No am stiffness or joint swelling. Noted an oral ulcer on palatte ~2 weeks ago. Went to [place] for sons' graduation. Otherwise, no fever, other rash, headache, anorexia, weight loss, alopecia (hair remains thin), lymphadenopathy, chills, jdry eyes, chest pain, shortness of breath, cough, anosmia, ageusia, nausea, vomiting, diarrhea, abdominal pain.  Of note, ESR decrease to 43 last visit.  June 28, 2021 PtGA 0.3 f/u SLE--feeling very well on MTX 17.5.  She has tolerated the dose without nausea for the last 2 weeks by holding the colchicine and plaquenil on the day she takes her methotrexae. She was taken off her oral contraeptive by gyn ~4 weeks ago--she had one break-through bleeding (followed by a UTI) for which she received macrobid x 4 days.  Her joints have been "really good" with resolution off tenderness of  right 3rd MCP and elbow; but persistent (but less) mild pain of ankles, there has been no swelling, warmth or stiffness.  She has noticed erythema of her face (she has not used any steroid cream)--the previously seen exematous rash on dorsum of bilateral hands has cleared with steroid cream application.  No fever, rash, anorexia, weight loss, alopecia, lymphadenopathy, chills, oral ulcers (previous oral ulcer has resolved), fatigue, dry eyes, chest pain, shortness of breath, cough, anosmia, ageusia, nausea, vomiting, diarrhea, abdominal pain.   Aug 9, 2021 PtGA 2.0 f/u SLE, was doing well until 2 weeks ago when she noted onset of stiffness and pain in bilateral wrists and shoulders.  She did not have associated swelling in these joints, nor in her ankles (previously affected).  She states that she has been experiencing many hot flashes since the discontinuation of her estrogen and is sleeping poorly.  Her previous fatigue has returned.  Alopecia continues but no rash (she has used steroid cream intermittently, fever, anorexia, weight loss, lymphadenopathy, chills, oral ulcers, dry eyes, chest pain, shortness of breath, cough, anosmia, ageusia, nausea, vomiting, diarrhea, abdominal pain. She notes that she is fatigued most of Sunday (she takes her methotrexate on Saturday night).  Sept 20, 2021 PtGA 1.8 f/u SLE; doing well with marked improvement of joint pains, stiffness, alopecia.  No longer with paresthesias down her legs.She still has pains in joints affected by OA. Still with fatigue. Her labs showed increased liver enzymes including GGT; her ALK phos was fractionated with a "normal" percentage of bone and liver.  Her last methotrexate dose was 8/7.  She is additionally taking supplements (including collagen, magnesium, tumeric) and has been meditating.  She received her third dose of the covid vaccine this month (with fatigue and achyness).  She has not been using flexeril (it has made her feel groggy upon awakening)  October 22, 2021  PtGA 1.0 f/u SLE continuing off methotrexate, with minimal signs/symptoms of "active SLE", i.e. no rash, alopecia (hair remains thin but is not falling out), joint pains/swelling, although she is beginning to note the onset of mild stiffness since off of methotrexate.  Fatigue is about the same (Still with hot flashes have resulted in poor sleep)..  No jaudice, fever, rash, anorexia, weight loss, lymphadenopathy, chills, oral ulcers, dry eyes, chest pain, shortness of breath, cough, anosmia, ageusia, nausea, vomiting, diarrhea, abdominal pain. Since last seen, she has had an abdominal ultrasound showing a fatty liver as well as a hypodense non-vascular intrahepatic structure with recommendation for a MRI with and without contrast.  The MRI showed multiple subcentimeter hepatic cysts as well as a 1.6x1.3 lesion, most likely a focal nodular hyperplasia and a second similarly appearing lesion in the subcapsular region of the right hepatic lobe.  I discussed these findings with the radiologist who recommended monitoring in 3-6 months.  The transaminases have returned to normal, her alk phos is lower and her ESR remains elevated.doing well (and now off methotrexate).   November 15, 2021 PtGA 1.4 f/u SLE--she saw her pcp last week (Nov 8) and had blood obtained for HgA1c, lipids, thyroid as well as cbc and chem.  She had a painful right occipital lymphnode (last week) which is now not tender and ?diminishing.  The AST from the PCP's labs is minimally elevated, and her ALK phos is greater than previously.  She has no abdominal symptoms.  She has no joint pain or swelling but stiffness is persisting; she additionally notes a small palatal ulcer for ~1 week.  No fever, rash, anorexia, unintentional weight loss (she is dieting and losing weight slowly), alopecia, chills, nasal ulcers, dry eyes, chest pain, shortness of breath, cough, anosmia, ageusia, nausea, vomiting, diarrhea, abdominal pain. She has an appointment with Dr Bar (hepatology) on Dec 6.   Dec 13, 2021  PtGA  2.2 F/u SLE--notes increased stiffness in small joints of her hands, additionally with right trochanteric bursa tenderness with difficulty lieing on her right side and with pain extending down lateral leg. The palatal ulcer and occipital (rt) lymph node seen last month have resolved. Otherwise, her symptoms are unchanged without fever, rash, anorexia, weight loss, alopecia, lymphadenopathy, chills, joint pain/swelling/stiffness, oral ulcers, fatigue, chest pain, shortness of breath, cough, nausea, vomiting, diarrhea, abdominal pain. She saw Dr Bar (hepatoloty) last week who has initiated a work-up which includes a liver biopsy (scheduled on 12/28) as well as MRI focusing on the biliary ducts (?PBC).  Of note, he raises the possibility that PBC may be related to her previous long term use of estrogen.  She additionally adds that she was asked if she has pruritus and said no, but that she responded "incorrectly" as she has mild pruritus of her lower extremities at night.  Jan 19, 2022 PtGA 0.6 f/u SLE--has had mild stiffness in her hands, otherwise without fever, rash, anorexia, weight loss, alopecia, lymphadenopathy, chills, joint pain/swelling/stiffness, oral ulcers, fatigue, dry eyes, chest pain, shortness of breath, cough, anosmia, ageusia, nausea, vomiting, diarrhea, abdominal pain. She has had a liver biopsy showing changes most consistent with a drug reaction.  Methotrexate may be a likely culprit although her long-standing birthcontrol (estrogen/progesterone) may have contributed.  Dr Bar is planning to monitor for 3 months.  She was offerred urosodiol (but has decided not to take it).  She was encouraged to lose weight. Of interest, her esr was 46 when checked last week.  Mar 9, 2021 PtGA 1.3 f/u SLE, continues with stiffness in her hands, lasting almost all days at times, as well as in knees and ankles (rt>left).  She notes that she has been more active as she is trying to lose weight. She has been on a collagen supplement for several months, missed a few days while travelling ~2 weeks ago and noted increased alopecia as well as the development of a malar rash.  No fever, rash, anorexia, weight loss, lymphadenopathy, chills, joint pain/swelling/stiffness, oral ulcers, fatigue, dry eyes, chest pain, shortness of breath, cough, anosmia, ageusia, nausea, vomiting, diarrhea, abdominal pain. No longer on folic acid (stopped when mtx was discontinued).  May 23, 2022 PtGA 1.5 f/u SLE, continues to feel about the same, i.e. she still notes stiffness in her hands and ankles.  However, there is no fever, rash, anorexia, alopecia, lymphadenopathy, chills, joint pain/swelling/stiffness, oral ulcers, fatigue, dry eyes, chest pain, shortness of breath, cough, anosmia, ageusia, nausea, vomiting, diarrhea, abdominal pain. She has been actively trying to lose weight (and has successfully lost 7 lbs).  She has continued with GI/liver follow-up and has started ursodiol treatment to facilitate recovery of lft abnormalities. Colchicine d/c'd by GI, but no significant clinical change.  Noting right hip/sciatic type pain which is improved upon taking cyclobenzaprine.  July 11, 2022 PtGA 2.2 f/u SLE-- over the past few weeks has noted reoccurrance of erythematous skin lesions that are similar to the lesion that was biopsied (and was consistent w SLE).  Still with hand achiness, particularly affecting the joints; still with fatigue.  She received the COVID booster on June 31st which was followed by a fever the next day and achiness and malaise 3 days later-- she tested negative for COVID, although her  tested positive. She has had pain in her lateral hip (trocanteric bursa) which is lessened by cyclobenzaprine.  She's noticed that her right ankle pain seems to correlate with the "hip" pain. She is considering cucurmin for it's anti-inflammatory effects.  She continues to lose weight slowly; her liver tests are improved on the urosodiol--she has a follow-up appointment with Dr Bar, hepatology next week.  No fever, anorexia, lymphadenopathy, chills, oral ulcers, fatigue, dry eyes, chest pain, shortness of breath, cough, headaches, nausea, vomiting, diarrhea, abdominal pain.   Sept 12, 2022 PtGA 0.4 f/u SLE, she is here for the ACEi screening visit.  In general, she is feeling better with no ankle stiffness or swelling of her ankles and with decreased (minimal) symptoms of right trocanteric bursa tenderness.  She has been receiving weekly acupuncture.  She continues on ursodiol and notes that she has had alopecia and intermittent pruritic rashes since initiating the drug which she attributes to the medication.  Otherwise, doing well, the previously seen ~1cm annular erynthematous lesion on right shin, pailer lesion on left shin and a hyperpigmented lesion in mid back have resolved.  She has no fever, rash, anorexia, weight loss, lymphadenopathy, chills, joint pain/swelling/stiffness, oral ulcers, fatigue, dry eyes, chest pain, shortness of breath, cough, anosmia, ageusia, nausea, vomiting, diarrhea, abdominal pain.    Oct 24, 2022  PtGA 1.5 f/u SLE-- not able to enroll in ACEi given insufficient uptake on PET scan, now returning for routine visit.  She has been under increased stress with her schedule being "off"-- she was in a hotel for 5 days with change in environment and diet and has missed several recent acupuncture visits.  She notes no hair growth, an oral ulcer on the roof of her mouth, joint stiffness of ankles, knees and with ~1 week right ankle swelling.  No fever, rash, anorexia, weight loss, alopecia, lymphadenopathy, chills, joint pain/swelling/stiffness, oral ulcers, fatigue, dry eyes, chest pain, shortness of breath, cough, nausea, vomiting, diarrhea, abdominal pain.   Jan 11, 2023  PtGA 1.5 f/u SLE, noted increased symptoms starting in December with pain and stiffness in right mcp's, pip's, elbow and ankle. Additionally noted malar rash. Her symptoms have improved but she still has significant stiffness for several hours. She saw hepatology and advised that she can use ibuprofen prn, and that the likelihood of colchicine contributing to the etiology of her elevated transaminases is low. (Of interest, her oral collagen supplementation may be playing a role). She has been receiving acupuncture since early September and notes that her right hip pain has resolved. No fever, rash, anorexia, weight loss, alopecia (continues to be stable since stopping urosodiol), lymphadenopathy, chills, oral ulcers, fatigue, dry eyes, chest pain, shortness of breath, cough, anosmia, ageusia, nausea, vomiting, diarrhea, abdominal pain.   Feb 8, 2023 PtGA 0.3 f/u SLE, feeling very well; used colchicine daily with relief of her stiffness and joint tenderness.  She had minor GI related symptoms and stopped after 2 weeks, however, her musculoskeletal symptoms continue to be resolved.  She continues with acupuncture which has helped her right hip pain "tremendously", also has helped her right lower back/paraspinal pain (which may have orginiated from "protecting" the right hip as she moved and walked).  No fever, anorexia, weight loss, alopecia, lymphadenopathy, chills, joint pain/swelling/stiffness, oral ulcers, fatigue, dry eyes, chest pain, shortness of breath, cough, anosmia, ageusia, nausea, vomiting, diarrhea, abdominal pain.  Her labs were checked by GI on Jan 31st and her GGT and AlkP although elevated are declining; her ESR has risen slightly from last month but is still lower than previously.  She has had facial erythema.  Previously, was not clear if her alopecia and pruritic macular rash are consistent with urosodiol.    April 26, 2026  0.5 f/u SLE, states that she took colchicine for 2 weeks and that her ankles and hip are better; she continues to have stiffness (not sure how long it is at its worse, but stiffness continues during the day). She continues with acupunture and attributes her reduction in musculoskeletal symptoms to this intervention; she has not even needed to use voltaren gel.  No fever, rash, anorexia, weight loss, alopecia, lymphadenopathy, chills, joint pain/swelling/stiffness, oral ulcers, fatigue, dry eyes, chest pain, shortness of breath, cough, headache, nausea, vomiting, diarrhea, abdominal pain. She saw hepatology last week and had bloods drawn-- her ALT, AST continue to be normal and her ALP and GGT have increased.  She will attempt to lose weight (25lbs) over next 6 months as this may be contributing to her abnormal liver function.  July 12, 2023 PtGA 0.5 f/u SLE, feels very well, with increased activity (she has bought a [employer]) and watching her diet, she has lost 9 lbs since her last visit.  She continues with acupuncture.  Her joint stiffness has improved significantly and her ankles are additionally better-- she is no longer on colchicine and notes no pain in her hips.  She has had no fever, rash, anorexia, weight loss, alopecia, lymphadenopathy, chills, joint pain/swelling/stiffness, oral ulcers, fatigue, dry eyes, chest pain, shortness of breath, cough, anosmia, ageusia, nausea, vomiting, diarrhea, abdominal pain. She is scheduled to see hepatology in October (if no significant change in her LFTs she will likely receive another liver biopsy).   Oct 11, 2023 PtGA 0.5 f/u SLE, feeling well without fever, rash, anorexia, weight loss, alopecia, lymphadenopathy, chills, joint pain/swelling/stiffness, oral ulcers, fatigue, dry eyes, chest pain, shortness of breath, cough, headache, nausea, vomiting, diarrhea, abdominal pain. Of note, she has not lost significant weight (she has been traveling and therefore eating out).  She has an appointment with hepatology later this month.  January 8, 2024 PtGA 1.1 f/u SLE, recently returned from trip to [place]; while there she noted erythema of arms and legs (despite wearing long sleeved tops and pants); she has also had a palatal ulcer (lasting ~1 week which resolved a week ago.  While away, she was standing for prolonged periods of time and her right knee became tender and swollen.  It has improved with use of diclofenac gel (as well as capsacin) and prn advil.  She has not had fever, anorexia, weight loss, alopecia, lymphadenopathy, chills, fatigue, dry eyes, chest pain, cough, nausea, vomiting, diarrhea, abdominal pain.   Mar 11, 2024 f/u SLE; in general feeling better-- no oral ulcers, right knee pain is improved (she was seen by [facility] and referred for physical therapy).  Has noted viral canker sores on interior of lips and gums.  She still has stiffness and tenderness of her PIPs and the erythema of her extremities is improved.  No fever, rash, anorexia, weight loss, alopecia, lymphadenopathy, chills,  fatigue, dry eyes, chest pain, shortness of breath, cough, headache, edema nausea, vomiting, diarrhea, abdominal pain.  Meds: Plaquenil 400mg (last optho exam Jan 2023), cyclobenzaprine 5mg po prn  PE:  132/85  241 NAD-- baslicaly unchanged:  Skin: she has mild erythema on her arms with tan lines,+canker sores on interior bottom lip and right inside gum, no malar rash; hair thin (but unchanged), tenderness of rt knee with fullness, no synovitis (previous fullness of right 2nd mcp and left 3rd mcp, tender and full right ankle have resolved), but +tenderness of bilateral PIPs.  No lymphadenopathy, periungal erythema, or edema.  Lungs: Clear, Cor: RRR w/o m/r/g, Abd: soft, NT, BS+, Ext: no CCE, Neuro-gait normal  Imp SLE --Improved clinical disease activity, but continued tenderness/stiffness of joints.  Will check labs;including GGT and lipids (she has eaten, but has requested).   Will continue plaquenil.,

## 2024-03-11 NOTE — PHYSICAL EXAM
[General Appearance - Alert] : alert [General Appearance - Well Nourished] : well nourished [General Appearance - Well Developed] : well developed [Extraocular Movements] : extraocular movements were intact [Outer Ear] : the ears and nose were normal in appearance [Examination Of The Oral Cavity] : the lips and gums were normal [Nasal Cavity] : the nasal mucosa and septum were normal [Neck Appearance] : the appearance of the neck was normal [] : no respiratory distress [Auscultation Breath Sounds / Voice Sounds] : lungs were clear to auscultation bilaterally [Exaggerated Use Of Accessory Muscles For Inspiration] : no accessory muscle use [Heart Rate And Rhythm] : heart rate was normal and rhythm regular [Heart Sounds] : normal S1 and S2 [Heart Sounds Gallop] : no gallops [Murmurs] : no murmurs [Heart Sounds Pericardial Friction Rub] : no pericardial rub [Bowel Sounds] : normal bowel sounds [Abdomen Tenderness] : non-tender [Abdomen Soft] : soft [Cervical Lymph Nodes Enlarged Posterior Bilaterally] : posterior cervical [Cervical Lymph Nodes Enlarged Anterior Bilaterally] : anterior cervical [Supraclavicular Lymph Nodes Enlarged Bilaterally] : supraclavicular [No Spinal Tenderness] : no spinal tenderness [No CVA Tenderness] : no ~M costovertebral angle tenderness [Abnormal Walk] : normal gait [Nail Clubbing] : no clubbing  or cyanosis of the fingernails [Motor Tone] : muscle strength and tone were normal [Skin Color & Pigmentation] : normal skin color and pigmentation [FreeTextEntry1] : see hpi

## 2024-03-12 LAB
B2 GLYCOPROT1 IGA SERPL IA-ACNC: 6.9 SAU
B2 GLYCOPROT1 IGG SER-ACNC: <5 SGU
B2 GLYCOPROT1 IGM SER-ACNC: <5 SMU
C3 SERPL-MCNC: 178 MG/DL
C4 SERPL-MCNC: 27 MG/DL
CARDIOLIPIN IGM SER-MCNC: <5 GPL
CARDIOLIPIN IGM SER-MCNC: <5 MPL
CREAT SPEC-SCNC: 73 MG/DL
CREAT/PROT UR: 0.1 RATIO
DEPRECATED CARDIOLIPIN IGA SER: <5 APL
DSDNA AB SER-ACNC: <12 IU/ML
PROT UR-MCNC: 6 MG/DL

## 2024-03-13 VITALS
BODY MASS INDEX: 44.3 KG/M2 | RESPIRATION RATE: 16 BRPM | TEMPERATURE: 97.8 F | DIASTOLIC BLOOD PRESSURE: 70 MMHG | HEART RATE: 74 BPM | WEIGHT: 242.2 LBS | OXYGEN SATURATION: 97 % | SYSTOLIC BLOOD PRESSURE: 129 MMHG

## 2024-04-10 ENCOUNTER — NON-APPOINTMENT (OUTPATIENT)
Age: 54
End: 2024-04-10

## 2024-04-29 ENCOUNTER — APPOINTMENT (OUTPATIENT)
Dept: RHEUMATOLOGY | Facility: CLINIC | Age: 54
End: 2024-04-29

## 2024-04-29 VITALS
SYSTOLIC BLOOD PRESSURE: 118 MMHG | BODY MASS INDEX: 44.45 KG/M2 | RESPIRATION RATE: 14 BRPM | OXYGEN SATURATION: 97 % | TEMPERATURE: 98 F | WEIGHT: 243 LBS | DIASTOLIC BLOOD PRESSURE: 81 MMHG | HEART RATE: 74 BPM

## 2024-04-29 LAB
BASOPHILS # BLD AUTO: 0.04 K/UL
BASOPHILS NFR BLD AUTO: 0.8 %
EOSINOPHIL # BLD AUTO: 0.3 K/UL
EOSINOPHIL NFR BLD AUTO: 6.3 %
ERYTHROCYTE [SEDIMENTATION RATE] IN BLOOD BY WESTERGREN METHOD: 88 MM/HR
HCT VFR BLD CALC: 37.8 %
HGB BLD-MCNC: 11.8 G/DL
IMM GRANULOCYTES NFR BLD AUTO: 0.4 %
LYMPHOCYTES # BLD AUTO: 1.44 K/UL
LYMPHOCYTES NFR BLD AUTO: 30.4 %
MAN DIFF?: NORMAL
MCHC RBC-ENTMCNC: 27.5 PG
MCHC RBC-ENTMCNC: 31.2 GM/DL
MCV RBC AUTO: 88.1 FL
MONOCYTES # BLD AUTO: 0.45 K/UL
MONOCYTES NFR BLD AUTO: 9.5 %
NEUTROPHILS # BLD AUTO: 2.49 K/UL
NEUTROPHILS NFR BLD AUTO: 52.6 %
PLATELET # BLD AUTO: 262 K/UL
RBC # BLD: 4.29 M/UL
RBC # FLD: 14.5 %
WBC # FLD AUTO: 4.74 K/UL

## 2024-04-30 ENCOUNTER — TRANSCRIPTION ENCOUNTER (OUTPATIENT)
Age: 54
End: 2024-04-30

## 2024-04-30 LAB
ALBUMIN SERPL ELPH-MCNC: 4.3 G/DL
ALP BLD-CCNC: 143 U/L
ALT SERPL-CCNC: 29 U/L
ANION GAP SERPL CALC-SCNC: 20 MMOL/L
APPEARANCE: CLEAR
AST SERPL-CCNC: 35 U/L
BILIRUB SERPL-MCNC: 0.2 MG/DL
BILIRUBIN URINE: NEGATIVE
BLOOD URINE: NEGATIVE
BUN SERPL-MCNC: 14 MG/DL
C3 SERPL-MCNC: 163 MG/DL
C4 SERPL-MCNC: 27 MG/DL
CALCIUM SERPL-MCNC: 9.2 MG/DL
CHLORIDE SERPL-SCNC: 100 MMOL/L
CO2 SERPL-SCNC: 23 MMOL/L
COLOR: YELLOW
CREAT SERPL-MCNC: 0.64 MG/DL
DSDNA AB SER-ACNC: <1 IU/ML
EGFR: 104 ML/MIN/1.73M2
GGT SERPL-CCNC: 62 U/L
GLUCOSE QUALITATIVE U: NEGATIVE MG/DL
GLUCOSE SERPL-MCNC: 35 MG/DL
KETONES URINE: NEGATIVE MG/DL
LEUKOCYTE ESTERASE URINE: NEGATIVE
NITRITE URINE: NEGATIVE
PH URINE: 7
POTASSIUM SERPL-SCNC: 4.4 MMOL/L
PROT SERPL-MCNC: 7.2 G/DL
PROTEIN URINE: NEGATIVE MG/DL
SODIUM SERPL-SCNC: 143 MMOL/L
SPECIFIC GRAVITY URINE: 1.01
UROBILINOGEN URINE: 0.2 MG/DL

## 2024-05-02 NOTE — HISTORY OF PRESENT ILLNESS
[FreeTextEntry1] : 53 year old female with SLE diagnosed 2016 with features that include malar rash, oral ulcers, arthritis, pericarditis, DNA+, alopecia, fever.  Of note, she is ROSE-.  Her serologies include - Sm/RNP/Ro/La/aPL; she has never been hypocomplementemic.  Her ESR has been persistently elevated. She has had a skin biopsy with interface dermatitis; her most pressing complaints have been of joint pain and stiffness, malar rash, fatigue.  She is historically anti-Ro negative (which can be associated with SjS and PBC).   Febrary 8, 2021 f/u SLE, on Methotrexate 12.5mg x4 weeks.  She continues on plaquenil and colchicine; MMF was tapered over 3 weeks since her last visit on January 12.  She notes marked improvement and has no pain.  Her previous stiffness has lessoned to ~1 hour (and there have been recent days when she had no stiffness).  She additionally feels markedly more energetic.  There is increased alopecia (?medication related), no rash, nausea, vomiting, abdominal pain, edema, ulcers, lymphadenopathy, fever, anorexia, weight loss, chest pain, shortness of breath, nausea, vomiting, edema, anosmia or ageusia.  She had a UTI the week of Jan 25--received antibiotics from pcp; also with tooth extraction 2/3 (and given amox for 5 days). She notes dryness of her skin (particularly of her hands)  April 19, 2019  PtGA 1.1 f/u SLE; continues to feel stiff over this past month with occasional swelling of left ankle. However, feels that she may be slightly improved compared to last visit. Methotrexate increased last visit from 12.5 to 15mg/week. Has had nausea following methotrexate on one of her recent doses. Notes that her dip's have developed swelling and cysts (which was exacerbated post-vaccine). Of note,she has a family history of hand oa in females. States that hair continues to be thin. No rash, alopecia, fever, anorexia, weight loss, lymphadenopathy, fatigue, dry eyes, chest pain, shortness of breath, cough, anosmia, ageusia, nausea, vomiting, diarrhea, abdominal pain. She will be visiting her sons in [place] for a college graduation, both sons have received the first dose of the Moderna vaccine.  May 17, 2021  PtGA 1.2 f/u SLE, feels well--has been taking increased MTX (17.5) for 3 weeks.  Feels fatigue the following day.  Doing well until a few days ago when she notes some facial erythema and some arthralgia of ankles and elbows.  No am stiffness or joint swelling. Noted an oral ulcer on palatte ~2 weeks ago. Went to [place] for sons' graduation. Otherwise, no fever, other rash, headache, anorexia, weight loss, alopecia (hair remains thin), lymphadenopathy, chills, jdry eyes, chest pain, shortness of breath, cough, anosmia, ageusia, nausea, vomiting, diarrhea, abdominal pain.  Of note, ESR decrease to 43 last visit.  June 28, 2021 PtGA 0.3 f/u SLE--feeling very well on MTX 17.5.  She has tolerated the dose without nausea for the last 2 weeks by holding the colchicine and plaquenil on the day she takes her methotrexae. She was taken off her oral contraeptive by gyn ~4 weeks ago--she had one break-through bleeding (followed by a UTI) for which she received macrobid x 4 days.  Her joints have been "really good" with resolution off tenderness of  right 3rd MCP and elbow; but persistent (but less) mild pain of ankles, there has been no swelling, warmth or stiffness.  She has noticed erythema of her face (she has not used any steroid cream)--the previously seen exematous rash on dorsum of bilateral hands has cleared with steroid cream application.  No fever, rash, anorexia, weight loss, alopecia, lymphadenopathy, chills, oral ulcers (previous oral ulcer has resolved), fatigue, dry eyes, chest pain, shortness of breath, cough, anosmia, ageusia, nausea, vomiting, diarrhea, abdominal pain.   Aug 9, 2021 PtGA 2.0 f/u SLE, was doing well until 2 weeks ago when she noted onset of stiffness and pain in bilateral wrists and shoulders.  She did not have associated swelling in these joints, nor in her ankles (previously affected).  She states that she has been experiencing many hot flashes since the discontinuation of her estrogen and is sleeping poorly.  Her previous fatigue has returned.  Alopecia continues but no rash (she has used steroid cream intermittently, fever, anorexia, weight loss, lymphadenopathy, chills, oral ulcers, dry eyes, chest pain, shortness of breath, cough, anosmia, ageusia, nausea, vomiting, diarrhea, abdominal pain. She notes that she is fatigued most of Sunday (she takes her methotrexate on Saturday night).  Sept 20, 2021 PtGA 1.8 f/u SLE; doing well with marked improvement of joint pains, stiffness, alopecia.  No longer with paresthesias down her legs.She still has pains in joints affected by OA. Still with fatigue. Her labs showed increased liver enzymes including GGT; her ALK phos was fractionated with a "normal" percentage of bone and liver.  Her last methotrexate dose was 8/7.  She is additionally taking supplements (including collagen, magnesium, tumeric) and has been meditating.  She received her third dose of the covid vaccine this month (with fatigue and achyness).  She has not been using flexeril (it has made her feel groggy upon awakening)  October 22, 2021  PtGA 1.0 f/u SLE continuing off methotrexate, with minimal signs/symptoms of "active SLE", i.e. no rash, alopecia (hair remains thin but is not falling out), joint pains/swelling, although she is beginning to note the onset of mild stiffness since off of methotrexate.  Fatigue is about the same (Still with hot flashes have resulted in poor sleep)..  No jaudice, fever, rash, anorexia, weight loss, lymphadenopathy, chills, oral ulcers, dry eyes, chest pain, shortness of breath, cough, anosmia, ageusia, nausea, vomiting, diarrhea, abdominal pain. Since last seen, she has had an abdominal ultrasound showing a fatty liver as well as a hypodense non-vascular intrahepatic structure with recommendation for a MRI with and without contrast.  The MRI showed multiple subcentimeter hepatic cysts as well as a 1.6x1.3 lesion, most likely a focal nodular hyperplasia and a second similarly appearing lesion in the subcapsular region of the right hepatic lobe.  I discussed these findings with the radiologist who recommended monitoring in 3-6 months.  The transaminases have returned to normal, her alk phos is lower and her ESR remains elevated.doing well (and now off methotrexate).   November 15, 2021 PtGA 1.4 f/u SLE--she saw her pcp last week (Nov 8) and had blood obtained for HgA1c, lipids, thyroid as well as cbc and chem.  She had a painful right occipital lymphnode (last week) which is now not tender and ?diminishing.  The AST from the PCP's labs is minimally elevated, and her ALK phos is greater than previously.  She has no abdominal symptoms.  She has no joint pain or swelling but stiffness is persisting; she additionally notes a small palatal ulcer for ~1 week.  No fever, rash, anorexia, unintentional weight loss (she is dieting and losing weight slowly), alopecia, chills, nasal ulcers, dry eyes, chest pain, shortness of breath, cough, anosmia, ageusia, nausea, vomiting, diarrhea, abdominal pain. She has an appointment with Dr Bar (hepatology) on Dec 6.   Dec 13, 2021  PtGA  2.2 F/u SLE--notes increased stiffness in small joints of her hands, additionally with right trochanteric bursa tenderness with difficulty lieing on her right side and with pain extending down lateral leg. The palatal ulcer and occipital (rt) lymph node seen last month have resolved. Otherwise, her symptoms are unchanged without fever, rash, anorexia, weight loss, alopecia, lymphadenopathy, chills, joint pain/swelling/stiffness, oral ulcers, fatigue, chest pain, shortness of breath, cough, nausea, vomiting, diarrhea, abdominal pain. She saw Dr Bar (hepatoloty) last week who has initiated a work-up which includes a liver biopsy (scheduled on 12/28) as well as MRI focusing on the biliary ducts (?PBC).  Of note, he raises the possibility that PBC may be related to her previous long term use of estrogen.  She additionally adds that she was asked if she has pruritus and said no, but that she responded "incorrectly" as she has mild pruritus of her lower extremities at night.  Jan 19, 2022 PtGA 0.6 f/u SLE--has had mild stiffness in her hands, otherwise without fever, rash, anorexia, weight loss, alopecia, lymphadenopathy, chills, joint pain/swelling/stiffness, oral ulcers, fatigue, dry eyes, chest pain, shortness of breath, cough, anosmia, ageusia, nausea, vomiting, diarrhea, abdominal pain. She has had a liver biopsy showing changes most consistent with a drug reaction.  Methotrexate may be a likely culprit although her long-standing birthcontrol (estrogen/progesterone) may have contributed.  Dr Bar is planning to monitor for 3 months.  She was offerred urosodiol (but has decided not to take it).  She was encouraged to lose weight. Of interest, her esr was 46 when checked last week.  Mar 9, 2021 PtGA 1.3 f/u SLE, continues with stiffness in her hands, lasting almost all days at times, as well as in knees and ankles (rt>left).  She notes that she has been more active as she is trying to lose weight. She has been on a collagen supplement for several months, missed a few days while travelling ~2 weeks ago and noted increased alopecia as well as the development of a malar rash.  No fever, rash, anorexia, weight loss, lymphadenopathy, chills, joint pain/swelling/stiffness, oral ulcers, fatigue, dry eyes, chest pain, shortness of breath, cough, anosmia, ageusia, nausea, vomiting, diarrhea, abdominal pain. No longer on folic acid (stopped when mtx was discontinued).  May 23, 2022 PtGA 1.5 f/u SLE, continues to feel about the same, i.e. she still notes stiffness in her hands and ankles.  However, there is no fever, rash, anorexia, alopecia, lymphadenopathy, chills, joint pain/swelling/stiffness, oral ulcers, fatigue, dry eyes, chest pain, shortness of breath, cough, anosmia, ageusia, nausea, vomiting, diarrhea, abdominal pain. She has been actively trying to lose weight (and has successfully lost 7 lbs).  She has continued with GI/liver follow-up and has started ursodiol treatment to facilitate recovery of lft abnormalities. Colchicine d/c'd by GI, but no significant clinical change.  Noting right hip/sciatic type pain which is improved upon taking cyclobenzaprine.  July 11, 2022 PtGA 2.2 f/u SLE-- over the past few weeks has noted reoccurrance of erythematous skin lesions that are similar to the lesion that was biopsied (and was consistent w SLE).  Still with hand achiness, particularly affecting the joints; still with fatigue.  She received the COVID booster on June 31st which was followed by a fever the next day and achiness and malaise 3 days later-- she tested negative for COVID, although her  tested positive. She has had pain in her lateral hip (trocanteric bursa) which is lessened by cyclobenzaprine.  She's noticed that her right ankle pain seems to correlate with the "hip" pain. She is considering cucurmin for it's anti-inflammatory effects.  She continues to lose weight slowly; her liver tests are improved on the urosodiol--she has a follow-up appointment with Dr Bar, hepatology next week.  No fever, anorexia, lymphadenopathy, chills, oral ulcers, fatigue, dry eyes, chest pain, shortness of breath, cough, headaches, nausea, vomiting, diarrhea, abdominal pain.   Sept 12, 2022 PtGA 0.4 f/u SLE, she is here for the ACEi screening visit.  In general, she is feeling better with no ankle stiffness or swelling of her ankles and with decreased (minimal) symptoms of right trocanteric bursa tenderness.  She has been receiving weekly acupuncture.  She continues on ursodiol and notes that she has had alopecia and intermittent pruritic rashes since initiating the drug which she attributes to the medication.  Otherwise, doing well, the previously seen ~1cm annular erynthematous lesion on right shin, pailer lesion on left shin and a hyperpigmented lesion in mid back have resolved.  She has no fever, rash, anorexia, weight loss, lymphadenopathy, chills, joint pain/swelling/stiffness, oral ulcers, fatigue, dry eyes, chest pain, shortness of breath, cough, anosmia, ageusia, nausea, vomiting, diarrhea, abdominal pain.    Oct 24, 2022  PtGA 1.5 f/u SLE-- not able to enroll in ACEi given insufficient uptake on PET scan, now returning for routine visit.  She has been under increased stress with her schedule being "off"-- she was in a hotel for 5 days with change in environment and diet and has missed several recent acupuncture visits.  She notes no hair growth, an oral ulcer on the roof of her mouth, joint stiffness of ankles, knees and with ~1 week right ankle swelling.  No fever, rash, anorexia, weight loss, alopecia, lymphadenopathy, chills, joint pain/swelling/stiffness, oral ulcers, fatigue, dry eyes, chest pain, shortness of breath, cough, nausea, vomiting, diarrhea, abdominal pain.   Jan 11, 2023  PtGA 1.5 f/u SLE, noted increased symptoms starting in December with pain and stiffness in right mcp's, pip's, elbow and ankle. Additionally noted malar rash. Her symptoms have improved but she still has significant stiffness for several hours. She saw hepatology and advised that she can use ibuprofen prn, and that the likelihood of colchicine contributing to the etiology of her elevated transaminases is low. (Of interest, her oral collagen supplementation may be playing a role). She has been receiving acupuncture since early September and notes that her right hip pain has resolved. No fever, rash, anorexia, weight loss, alopecia (continues to be stable since stopping urosodiol), lymphadenopathy, chills, oral ulcers, fatigue, dry eyes, chest pain, shortness of breath, cough, anosmia, ageusia, nausea, vomiting, diarrhea, abdominal pain.   Feb 8, 2023 PtGA 0.3 f/u SLE, feeling very well; used colchicine daily with relief of her stiffness and joint tenderness.  She had minor GI related symptoms and stopped after 2 weeks, however, her musculoskeletal symptoms continue to be resolved.  She continues with acupuncture which has helped her right hip pain "tremendously", also has helped her right lower back/paraspinal pain (which may have orginiated from "protecting" the right hip as she moved and walked).  No fever, anorexia, weight loss, alopecia, lymphadenopathy, chills, joint pain/swelling/stiffness, oral ulcers, fatigue, dry eyes, chest pain, shortness of breath, cough, anosmia, ageusia, nausea, vomiting, diarrhea, abdominal pain.  Her labs were checked by GI on Jan 31st and her GGT and AlkP although elevated are declining; her ESR has risen slightly from last month but is still lower than previously.  She has had facial erythema.  Previously, was not clear if her alopecia and pruritic macular rash are consistent with urosodiol.    April 26, 2026  0.5 f/u SLE, states that she took colchicine for 2 weeks and that her ankles and hip are better; she continues to have stiffness (not sure how long it is at its worse, but stiffness continues during the day). She continues with acupunture and attributes her reduction in musculoskeletal symptoms to this intervention; she has not even needed to use voltaren gel.  No fever, rash, anorexia, weight loss, alopecia, lymphadenopathy, chills, joint pain/swelling/stiffness, oral ulcers, fatigue, dry eyes, chest pain, shortness of breath, cough, headache, nausea, vomiting, diarrhea, abdominal pain. She saw hepatology last week and had bloods drawn-- her ALT, AST continue to be normal and her ALP and GGT have increased.  She will attempt to lose weight (25lbs) over next 6 months as this may be contributing to her abnormal liver function.  July 12, 2023 PtGA 0.5 f/u SLE, feels very well, with increased activity (she has bought a Sway) and watching her diet, she has lost 9 lbs since her last visit.  She continues with acupuncture.  Her joint stiffness has improved significantly and her ankles are additionally better-- she is no longer on colchicine and notes no pain in her hips.  She has had no fever, rash, anorexia, weight loss, alopecia, lymphadenopathy, chills, joint pain/swelling/stiffness, oral ulcers, fatigue, dry eyes, chest pain, shortness of breath, cough, anosmia, ageusia, nausea, vomiting, diarrhea, abdominal pain. She is scheduled to see hepatology in October (if no significant change in her LFTs she will likely receive another liver biopsy).   Oct 11, 2023 PtGA 0.5 f/u SLE, feeling well without fever, rash, anorexia, weight loss, alopecia, lymphadenopathy, chills, joint pain/swelling/stiffness, oral ulcers, fatigue, dry eyes, chest pain, shortness of breath, cough, headache, nausea, vomiting, diarrhea, abdominal pain. Of note, she has not lost significant weight (she has been traveling and therefore eating out).  She has an appointment with hepatology later this month.  January 8, 2024 PtGA 1.1 f/u SLE, recently returned from trip to [place]; while there she noted erythema of arms and legs (despite wearing long sleeved tops and pants); she has also had a palatal ulcer (lasting ~1 week which resolved a week ago.  While away, she was standing for prolonged periods of time and her right knee became tender and swollen.  It has improved with use of diclofenac gel (as well as capsacin) and prn advil.  She has not had fever, anorexia, weight loss, alopecia, lymphadenopathy, chills, fatigue, dry eyes, chest pain, cough, nausea, vomiting, diarrhea, abdominal pain.   Mar 11, 2024 f/u SLE; in general feeling better-- no oral ulcers, right knee pain is improved (she was seen by [facility] and referred for physical therapy).  Has noted viral canker sores on interior of lips and gums.  She still has stiffness and tenderness of her PIPs and the erythema of her extremities is improved.  No fever, rash, anorexia, weight loss, alopecia, lymphadenopathy, chills,  fatigue, dry eyes, chest pain, shortness of breath, cough, headache, edema nausea, vomiting, diarrhea, abdominal pain.  Apr 29, 2024  PtGA 1.2 f/u SLE,  over the last few weeks has noted increased in her joint stiffness, now lasting several hours and fullness/swelling of her knuckles and right ankle.  Additionally with an erythematous macular papular rash on upper extremities (upper arm), as well as new alopecia with hair on the pillow.  No fever, rash, anorexia, weight loss, lymphadenopathy, chills, oral ulcers, fatigue, dry eyes, chest pain, shortness of breath, cough, headache, edema, nausea, vomiting, diarrhea, abdominal pain.  She has been going to physcial therapy for her right knee for 2 months/15 sessions and notes that it does feel better.  Meds: Plaquenil 400mg (last optho exam Jan 2023), cyclobenzaprine 5mg po prn  PE:  [id number]  243 NAD--   Skin: macular eythema on bilateral upper arms and neck/upper chest ,  hair diffusely thin; fullness and  tenderness of bilateral MCPs, PIPs and wrists with inability to make a fist left worse than right, particularly of  right 2nd mcp and left 3rd mcp,; right ankle synovitis; rt knee with fullness,  +tenderness of bilateral PIPs.  No lymphadenopathy, periungal erythema, or edema.  Lungs: Clear, Cor: RRR w/o m/r/g, Abd: soft, NT, BS+, Ext: no CCE, Neuro-gait normal  Imp SLE --increased musculoskeltal activity and mucocutaneous (alopecia) alopecia; .  Will check labs;including GGT; have discussed clinical trial,

## 2024-05-02 NOTE — PHYSICAL EXAM
[General Appearance - Alert] : alert [General Appearance - Well Nourished] : well nourished [General Appearance - Well Developed] : well developed [Extraocular Movements] : extraocular movements were intact [Outer Ear] : the ears and nose were normal in appearance [Examination Of The Oral Cavity] : the lips and gums were normal [Nasal Cavity] : the nasal mucosa and septum were normal [Neck Appearance] : the appearance of the neck was normal [] : no respiratory distress [Exaggerated Use Of Accessory Muscles For Inspiration] : no accessory muscle use [Auscultation Breath Sounds / Voice Sounds] : lungs were clear to auscultation bilaterally [Heart Rate And Rhythm] : heart rate was normal and rhythm regular [Heart Sounds] : normal S1 and S2 [Heart Sounds Gallop] : no gallops [Murmurs] : no murmurs [Heart Sounds Pericardial Friction Rub] : no pericardial rub [Bowel Sounds] : normal bowel sounds [Abdomen Soft] : soft [Abdomen Tenderness] : non-tender [Cervical Lymph Nodes Enlarged Posterior Bilaterally] : posterior cervical [Cervical Lymph Nodes Enlarged Anterior Bilaterally] : anterior cervical [Supraclavicular Lymph Nodes Enlarged Bilaterally] : supraclavicular [No CVA Tenderness] : no ~M costovertebral angle tenderness [No Spinal Tenderness] : no spinal tenderness [FreeTextEntry1] : see hpi

## 2024-06-19 ENCOUNTER — LABORATORY RESULT (OUTPATIENT)
Age: 54
End: 2024-06-19

## 2024-06-19 ENCOUNTER — APPOINTMENT (OUTPATIENT)
Dept: RHEUMATOLOGY | Facility: CLINIC | Age: 54
End: 2024-06-19

## 2024-06-19 VITALS
TEMPERATURE: 97.8 F | BODY MASS INDEX: 44.08 KG/M2 | HEART RATE: 76 BPM | WEIGHT: 241 LBS | RESPIRATION RATE: 16 BRPM | OXYGEN SATURATION: 97 %

## 2024-06-19 DIAGNOSIS — M32.9 SYSTEMIC LUPUS ERYTHEMATOSUS, UNSPECIFIED: ICD-10-CM

## 2024-06-19 LAB
APPEARANCE: CLEAR
BASOPHILS # BLD AUTO: 0.06 K/UL
BASOPHILS NFR BLD AUTO: 1 %
BILIRUBIN URINE: NEGATIVE
BLOOD URINE: NEGATIVE
COLOR: YELLOW
EOSINOPHIL # BLD AUTO: 0.28 K/UL
EOSINOPHIL NFR BLD AUTO: 4.8 %
ERYTHROCYTE [SEDIMENTATION RATE] IN BLOOD BY WESTERGREN METHOD: 61 MM/HR
GLUCOSE QUALITATIVE U: NEGATIVE MG/DL
HCT VFR BLD CALC: 36.4 %
HGB BLD-MCNC: 11.6 G/DL
IMM GRANULOCYTES NFR BLD AUTO: 0.3 %
KETONES URINE: NEGATIVE MG/DL
LEUKOCYTE ESTERASE URINE: ABNORMAL
LYMPHOCYTES # BLD AUTO: 1.9 K/UL
LYMPHOCYTES NFR BLD AUTO: 32.8 %
MAN DIFF?: NORMAL
MCHC RBC-ENTMCNC: 27.9 PG
MCHC RBC-ENTMCNC: 31.9 GM/DL
MCV RBC AUTO: 87.5 FL
MONOCYTES # BLD AUTO: 0.47 K/UL
MONOCYTES NFR BLD AUTO: 8.1 %
NEUTROPHILS # BLD AUTO: 3.06 K/UL
NEUTROPHILS NFR BLD AUTO: 53 %
NITRITE URINE: NEGATIVE
PH URINE: 6.5
PLATELET # BLD AUTO: 279 K/UL
PROTEIN URINE: NEGATIVE MG/DL
RBC # BLD: 4.16 M/UL
RBC # FLD: 13.7 %
SPECIFIC GRAVITY URINE: 1.01
UROBILINOGEN URINE: 0.2 MG/DL
WBC # FLD AUTO: 5.79 K/UL

## 2024-06-19 RX ORDER — COLCHICINE 0.6 MG/1
0.6 TABLET ORAL
Qty: 30 | Refills: 2 | Status: ACTIVE | COMMUNITY
Start: 1900-01-01 | End: 1900-01-01

## 2024-06-19 RX ORDER — CYCLOBENZAPRINE HYDROCHLORIDE 5 MG/1
5 TABLET, FILM COATED ORAL DAILY
Qty: 90 | Refills: 0 | Status: ACTIVE | COMMUNITY
Start: 2021-02-02 | End: 1900-01-01

## 2024-06-19 NOTE — PHYSICAL EXAM
[General Appearance - Alert] : alert [General Appearance - Well Nourished] : well nourished [General Appearance - Well Developed] : well developed [Extraocular Movements] : extraocular movements were intact [Outer Ear] : the ears and nose were normal in appearance [Examination Of The Oral Cavity] : the lips and gums were normal [Nasal Cavity] : the nasal mucosa and septum were normal [Neck Appearance] : the appearance of the neck was normal [] : no respiratory distress [Exaggerated Use Of Accessory Muscles For Inspiration] : no accessory muscle use [Auscultation Breath Sounds / Voice Sounds] : lungs were clear to auscultation bilaterally [Heart Rate And Rhythm] : heart rate was normal and rhythm regular [Heart Sounds] : normal S1 and S2 [Heart Sounds Gallop] : no gallops [Murmurs] : no murmurs [Heart Sounds Pericardial Friction Rub] : no pericardial rub [Bowel Sounds] : normal bowel sounds [Abdomen Soft] : soft [Abdomen Tenderness] : non-tender [Cervical Lymph Nodes Enlarged Posterior Bilaterally] : posterior cervical [Cervical Lymph Nodes Enlarged Anterior Bilaterally] : anterior cervical [Supraclavicular Lymph Nodes Enlarged Bilaterally] : supraclavicular [No CVA Tenderness] : no ~M costovertebral angle tenderness [No Spinal Tenderness] : no spinal tenderness [FreeTextEntry1] : see hpi [No Focal Deficits] : no focal deficits

## 2024-06-19 NOTE — HISTORY OF PRESENT ILLNESS
[FreeTextEntry1] : 53 year old female with SLE diagnosed 2016 with features that include malar rash, oral ulcers, arthritis, pericarditis, DNA+, alopecia, fever.  Of note, she is ROSE-.  Her serologies include - Sm/RNP/Ro/La/aPL; she has never been hypocomplementemic.  Her ESR has been persistently elevated. She has had a skin biopsy with interface dermatitis; her most pressing complaints have been of joint pain and stiffness, malar rash, fatigue.  She is historically anti-Ro negative (which can be associated with SjS and PBC).   Febrary 8, 2021 f/u SLE, on Methotrexate 12.5mg x4 weeks.  She continues on plaquenil and colchicine; MMF was tapered over 3 weeks since her last visit on January 12.  She notes marked improvement and has no pain.  Her previous stiffness has lessoned to ~1 hour (and there have been recent days when she had no stiffness).  She additionally feels markedly more energetic.  There is increased alopecia (?medication related), no rash, nausea, vomiting, abdominal pain, edema, ulcers, lymphadenopathy, fever, anorexia, weight loss, chest pain, shortness of breath, nausea, vomiting, edema, anosmia or ageusia.  She had a UTI the week of Jan 25--received antibiotics from pcp; also with tooth extraction 2/3 (and given amox for 5 days). She notes dryness of her skin (particularly of her hands)  April 19, 2019  PtGA 1.1 f/u SLE; continues to feel stiff over this past month with occasional swelling of left ankle. However, feels that she may be slightly improved compared to last visit. Methotrexate increased last visit from 12.5 to 15mg/week. Has had nausea following methotrexate on one of her recent doses. Notes that her dip's have developed swelling and cysts (which was exacerbated post-vaccine). Of note,she has a family history of hand oa in females. States that hair continues to be thin. No rash, alopecia, fever, anorexia, weight loss, lymphadenopathy, fatigue, dry eyes, chest pain, shortness of breath, cough, anosmia, ageusia, nausea, vomiting, diarrhea, abdominal pain. She will be visiting her sons in [place] for a college graduation, both sons have received the first dose of the Moderna vaccine.  May 17, 2021  PtGA 1.2 f/u SLE, feels well--has been taking increased MTX (17.5) for 3 weeks.  Feels fatigue the following day.  Doing well until a few days ago when she notes some facial erythema and some arthralgia of ankles and elbows.  No am stiffness or joint swelling. Noted an oral ulcer on palatte ~2 weeks ago. Went to [place] for sons' graduation. Otherwise, no fever, other rash, headache, anorexia, weight loss, alopecia (hair remains thin), lymphadenopathy, chills, jdry eyes, chest pain, shortness of breath, cough, anosmia, ageusia, nausea, vomiting, diarrhea, abdominal pain.  Of note, ESR decrease to 43 last visit.  June 28, 2021 PtGA 0.3 f/u SLE--feeling very well on MTX 17.5.  She has tolerated the dose without nausea for the last 2 weeks by holding the colchicine and plaquenil on the day she takes her methotrexae. She was taken off her oral contraeptive by gyn ~4 weeks ago--she had one break-through bleeding (followed by a UTI) for which she received macrobid x 4 days.  Her joints have been "really good" with resolution off tenderness of  right 3rd MCP and elbow; but persistent (but less) mild pain of ankles, there has been no swelling, warmth or stiffness.  She has noticed erythema of her face (she has not used any steroid cream)--the previously seen exematous rash on dorsum of bilateral hands has cleared with steroid cream application.  No fever, rash, anorexia, weight loss, alopecia, lymphadenopathy, chills, oral ulcers (previous oral ulcer has resolved), fatigue, dry eyes, chest pain, shortness of breath, cough, anosmia, ageusia, nausea, vomiting, diarrhea, abdominal pain.   Aug 9, 2021 PtGA 2.0 f/u SLE, was doing well until 2 weeks ago when she noted onset of stiffness and pain in bilateral wrists and shoulders.  She did not have associated swelling in these joints, nor in her ankles (previously affected).  She states that she has been experiencing many hot flashes since the discontinuation of her estrogen and is sleeping poorly.  Her previous fatigue has returned.  Alopecia continues but no rash (she has used steroid cream intermittently, fever, anorexia, weight loss, lymphadenopathy, chills, oral ulcers, dry eyes, chest pain, shortness of breath, cough, anosmia, ageusia, nausea, vomiting, diarrhea, abdominal pain. She notes that she is fatigued most of Sunday (she takes her methotrexate on Saturday night).  Sept 20, 2021 PtGA 1.8 f/u SLE; doing well with marked improvement of joint pains, stiffness, alopecia.  No longer with paresthesias down her legs.She still has pains in joints affected by OA. Still with fatigue. Her labs showed increased liver enzymes including GGT; her ALK phos was fractionated with a "normal" percentage of bone and liver.  Her last methotrexate dose was 8/7.  She is additionally taking supplements (including collagen, magnesium, tumeric) and has been meditating.  She received her third dose of the covid vaccine this month (with fatigue and achyness).  She has not been using flexeril (it has made her feel groggy upon awakening)  October 22, 2021  PtGA 1.0 f/u SLE continuing off methotrexate, with minimal signs/symptoms of "active SLE", i.e. no rash, alopecia (hair remains thin but is not falling out), joint pains/swelling, although she is beginning to note the onset of mild stiffness since off of methotrexate.  Fatigue is about the same (Still with hot flashes have resulted in poor sleep)..  No jaudice, fever, rash, anorexia, weight loss, lymphadenopathy, chills, oral ulcers, dry eyes, chest pain, shortness of breath, cough, anosmia, ageusia, nausea, vomiting, diarrhea, abdominal pain. Since last seen, she has had an abdominal ultrasound showing a fatty liver as well as a hypodense non-vascular intrahepatic structure with recommendation for a MRI with and without contrast.  The MRI showed multiple subcentimeter hepatic cysts as well as a 1.6x1.3 lesion, most likely a focal nodular hyperplasia and a second similarly appearing lesion in the subcapsular region of the right hepatic lobe.  I discussed these findings with the radiologist who recommended monitoring in 3-6 months.  The transaminases have returned to normal, her alk phos is lower and her ESR remains elevated.doing well (and now off methotrexate).   November 15, 2021 PtGA 1.4 f/u SLE--she saw her pcp last week (Nov 8) and had blood obtained for HgA1c, lipids, thyroid as well as cbc and chem.  She had a painful right occipital lymphnode (last week) which is now not tender and ?diminishing.  The AST from the PCP's labs is minimally elevated, and her ALK phos is greater than previously.  She has no abdominal symptoms.  She has no joint pain or swelling but stiffness is persisting; she additionally notes a small palatal ulcer for ~1 week.  No fever, rash, anorexia, unintentional weight loss (she is dieting and losing weight slowly), alopecia, chills, nasal ulcers, dry eyes, chest pain, shortness of breath, cough, anosmia, ageusia, nausea, vomiting, diarrhea, abdominal pain. She has an appointment with Dr Bar (hepatology) on Dec 6.   Dec 13, 2021  PtGA  2.2 F/u SLE--notes increased stiffness in small joints of her hands, additionally with right trochanteric bursa tenderness with difficulty lieing on her right side and with pain extending down lateral leg. The palatal ulcer and occipital (rt) lymph node seen last month have resolved. Otherwise, her symptoms are unchanged without fever, rash, anorexia, weight loss, alopecia, lymphadenopathy, chills, joint pain/swelling/stiffness, oral ulcers, fatigue, chest pain, shortness of breath, cough, nausea, vomiting, diarrhea, abdominal pain. She saw Dr Bar (hepatoloty) last week who has initiated a work-up which includes a liver biopsy (scheduled on 12/28) as well as MRI focusing on the biliary ducts (?PBC).  Of note, he raises the possibility that PBC may be related to her previous long term use of estrogen.  She additionally adds that she was asked if she has pruritus and said no, but that she responded "incorrectly" as she has mild pruritus of her lower extremities at night.  Jan 19, 2022 PtGA 0.6 f/u SLE--has had mild stiffness in her hands, otherwise without fever, rash, anorexia, weight loss, alopecia, lymphadenopathy, chills, joint pain/swelling/stiffness, oral ulcers, fatigue, dry eyes, chest pain, shortness of breath, cough, anosmia, ageusia, nausea, vomiting, diarrhea, abdominal pain. She has had a liver biopsy showing changes most consistent with a drug reaction.  Methotrexate may be a likely culprit although her long-standing birthcontrol (estrogen/progesterone) may have contributed.  Dr Bar is planning to monitor for 3 months.  She was offerred urosodiol (but has decided not to take it).  She was encouraged to lose weight. Of interest, her esr was 46 when checked last week.  Mar 9, 2021 PtGA 1.3 f/u SLE, continues with stiffness in her hands, lasting almost all days at times, as well as in knees and ankles (rt>left).  She notes that she has been more active as she is trying to lose weight. She has been on a collagen supplement for several months, missed a few days while travelling ~2 weeks ago and noted increased alopecia as well as the development of a malar rash.  No fever, rash, anorexia, weight loss, lymphadenopathy, chills, joint pain/swelling/stiffness, oral ulcers, fatigue, dry eyes, chest pain, shortness of breath, cough, anosmia, ageusia, nausea, vomiting, diarrhea, abdominal pain. No longer on folic acid (stopped when mtx was discontinued).  May 23, 2022 PtGA 1.5 f/u SLE, continues to feel about the same, i.e. she still notes stiffness in her hands and ankles.  However, there is no fever, rash, anorexia, alopecia, lymphadenopathy, chills, joint pain/swelling/stiffness, oral ulcers, fatigue, dry eyes, chest pain, shortness of breath, cough, anosmia, ageusia, nausea, vomiting, diarrhea, abdominal pain. She has been actively trying to lose weight (and has successfully lost 7 lbs).  She has continued with GI/liver follow-up and has started ursodiol treatment to facilitate recovery of lft abnormalities. Colchicine d/c'd by GI, but no significant clinical change.  Noting right hip/sciatic type pain which is improved upon taking cyclobenzaprine.  July 11, 2022 PtGA 2.2 f/u SLE-- over the past few weeks has noted reoccurrance of erythematous skin lesions that are similar to the lesion that was biopsied (and was consistent w SLE).  Still with hand achiness, particularly affecting the joints; still with fatigue.  She received the COVID booster on June 31st which was followed by a fever the next day and achiness and malaise 3 days later-- she tested negative for COVID, although her  tested positive. She has had pain in her lateral hip (trocanteric bursa) which is lessened by cyclobenzaprine.  She's noticed that her right ankle pain seems to correlate with the "hip" pain. She is considering cucurmin for it's anti-inflammatory effects.  She continues to lose weight slowly; her liver tests are improved on the urosodiol--she has a follow-up appointment with Dr Bar, hepatology next week.  No fever, anorexia, lymphadenopathy, chills, oral ulcers, fatigue, dry eyes, chest pain, shortness of breath, cough, headaches, nausea, vomiting, diarrhea, abdominal pain.   Sept 12, 2022 PtGA 0.4 f/u SLE, she is here for the ACEi screening visit.  In general, she is feeling better with no ankle stiffness or swelling of her ankles and with decreased (minimal) symptoms of right trocanteric bursa tenderness.  She has been receiving weekly acupuncture.  She continues on ursodiol and notes that she has had alopecia and intermittent pruritic rashes since initiating the drug which she attributes to the medication.  Otherwise, doing well, the previously seen ~1cm annular erynthematous lesion on right shin, pailer lesion on left shin and a hyperpigmented lesion in mid back have resolved.  She has no fever, rash, anorexia, weight loss, lymphadenopathy, chills, joint pain/swelling/stiffness, oral ulcers, fatigue, dry eyes, chest pain, shortness of breath, cough, anosmia, ageusia, nausea, vomiting, diarrhea, abdominal pain.    Oct 24, 2022  PtGA 1.5 f/u SLE-- not able to enroll in ACEi given insufficient uptake on PET scan, now returning for routine visit.  She has been under increased stress with her schedule being "off"-- she was in a hotel for 5 days with change in environment and diet and has missed several recent acupuncture visits.  She notes no hair growth, an oral ulcer on the roof of her mouth, joint stiffness of ankles, knees and with ~1 week right ankle swelling.  No fever, rash, anorexia, weight loss, alopecia, lymphadenopathy, chills, joint pain/swelling/stiffness, oral ulcers, fatigue, dry eyes, chest pain, shortness of breath, cough, nausea, vomiting, diarrhea, abdominal pain.   Jan 11, 2023  PtGA 1.5 f/u SLE, noted increased symptoms starting in December with pain and stiffness in right mcp's, pip's, elbow and ankle. Additionally noted malar rash. Her symptoms have improved but she still has significant stiffness for several hours. She saw hepatology and advised that she can use ibuprofen prn, and that the likelihood of colchicine contributing to the etiology of her elevated transaminases is low. (Of interest, her oral collagen supplementation may be playing a role). She has been receiving acupuncture since early September and notes that her right hip pain has resolved. No fever, rash, anorexia, weight loss, alopecia (continues to be stable since stopping urosodiol), lymphadenopathy, chills, oral ulcers, fatigue, dry eyes, chest pain, shortness of breath, cough, anosmia, ageusia, nausea, vomiting, diarrhea, abdominal pain.   Feb 8, 2023 PtGA 0.3 f/u SLE, feeling very well; used colchicine daily with relief of her stiffness and joint tenderness.  She had minor GI related symptoms and stopped after 2 weeks, however, her musculoskeletal symptoms continue to be resolved.  She continues with acupuncture which has helped her right hip pain "tremendously", also has helped her right lower back/paraspinal pain (which may have orginiated from "protecting" the right hip as she moved and walked).  No fever, anorexia, weight loss, alopecia, lymphadenopathy, chills, joint pain/swelling/stiffness, oral ulcers, fatigue, dry eyes, chest pain, shortness of breath, cough, anosmia, ageusia, nausea, vomiting, diarrhea, abdominal pain.  Her labs were checked by GI on Jan 31st and her GGT and AlkP although elevated are declining; her ESR has risen slightly from last month but is still lower than previously.  She has had facial erythema.  Previously, was not clear if her alopecia and pruritic macular rash are consistent with urosodiol.    April 26, 2026  0.5 f/u SLE, states that she took colchicine for 2 weeks and that her ankles and hip are better; she continues to have stiffness (not sure how long it is at its worse, but stiffness continues during the day). She continues with acupunture and attributes her reduction in musculoskeletal symptoms to this intervention; she has not even needed to use voltaren gel.  No fever, rash, anorexia, weight loss, alopecia, lymphadenopathy, chills, joint pain/swelling/stiffness, oral ulcers, fatigue, dry eyes, chest pain, shortness of breath, cough, headache, nausea, vomiting, diarrhea, abdominal pain. She saw hepatology last week and had bloods drawn-- her ALT, AST continue to be normal and her ALP and GGT have increased.  She will attempt to lose weight (25lbs) over next 6 months as this may be contributing to her abnormal liver function.  July 12, 2023 PtGA 0.5 f/u SLE, feels very well, with increased activity (she has bought a [employer]) and watching her diet, she has lost 9 lbs since her last visit.  She continues with acupuncture.  Her joint stiffness has improved significantly and her ankles are additionally better-- she is no longer on colchicine and notes no pain in her hips.  She has had no fever, rash, anorexia, weight loss, alopecia, lymphadenopathy, chills, joint pain/swelling/stiffness, oral ulcers, fatigue, dry eyes, chest pain, shortness of breath, cough, anosmia, ageusia, nausea, vomiting, diarrhea, abdominal pain. She is scheduled to see hepatology in October (if no significant change in her LFTs she will likely receive another liver biopsy).   Oct 11, 2023 PtGA 0.5 f/u SLE, feeling well without fever, rash, anorexia, weight loss, alopecia, lymphadenopathy, chills, joint pain/swelling/stiffness, oral ulcers, fatigue, dry eyes, chest pain, shortness of breath, cough, headache, nausea, vomiting, diarrhea, abdominal pain. Of note, she has not lost significant weight (she has been traveling and therefore eating out).  She has an appointment with hepatology later this month.  January 8, 2024 PtGA 1.1 f/u SLE, recently returned from trip to [place]; while there she noted erythema of arms and legs (despite wearing long sleeved tops and pants); she has also had a palatal ulcer (lasting ~1 week which resolved a week ago.  While away, she was standing for prolonged periods of time and her right knee became tender and swollen.  It has improved with use of diclofenac gel (as well as capsacin) and prn advil.  She has not had fever, anorexia, weight loss, alopecia, lymphadenopathy, chills, fatigue, dry eyes, chest pain, cough, nausea, vomiting, diarrhea, abdominal pain.   Mar 11, 2024 f/u SLE; in general feeling better-- no oral ulcers, right knee pain is improved (she was seen by [facility] and referred for physical therapy).  Has noted viral canker sores on interior of lips and gums.  She still has stiffness and tenderness of her PIPs and the erythema of her extremities is improved.  No fever, rash, anorexia, weight loss, alopecia, lymphadenopathy, chills,  fatigue, dry eyes, chest pain, shortness of breath, cough, headache, edema nausea, vomiting, diarrhea, abdominal pain.  Apr 29, 2024  PtGA 1.2 f/u SLE,  over the last few weeks has noted increased in her joint stiffness, now lasting several hours and fullness/swelling of her knuckles and right ankle.  Additionally with an erythematous macular papular rash on upper extremities (upper arm), as well as new alopecia with hair on the pillow.  No fever, rash, anorexia, weight loss, lymphadenopathy, chills, oral ulcers, fatigue, dry eyes, chest pain, shortness of breath, cough, headache, edema, nausea, vomiting, diarrhea, abdominal pain.  She has been going to physcial therapy for her right knee for 2 months/15 sessions and notes that it does feel better.  June 19, 2024 PtGA 0.5 f/u SLE, colchicine 0.6mg/d was initiated following her last visit-- she feels markedly improved with resolution of joint swelling-- her right ankle with tr tenderness and with minimal stiffness (her right knee with known mechanical pain is also improved); additionally, she has minimum fatigue, and notes that her energy has improved and that her alopecia has also improved.  She continues with a faint (but improved) pink rash on her arms (she is wearing a sleevless dress today).  Continues on hcq (no eye exam since Jan 2023). No fever, rash, anorexia, weight loss, alopecia, lymphadenopathy, chills, joint pain/swelling/stiffness, oral ulcers, fatigue, dry eyes, chest pain, shortness of breath, cough, headache, edema, nausea, vomiting, diarrhea, abdominal pain.   Previously observed MS findings on last exam have resolved (fullness and  tenderness of bilateral MCPs, PIPs and wrists with inability to make a fist left worse than right, particularly of  right 2nd mcp and left 3rd mcp,; right ankle synovitis; rt knee with fullness,  +tenderness of bilateral PIPs.)  Meds: Plaquenil 400mg (last optho exam Jan 2023), cyclobenzaprine 5mg po prn  PE:  121/70  241 NAD--   Skin: faint macular eythema on bilateral upper arms,  hair diffusely thin;  no oral/nasal ulcers;-- tr tenderness of right ankle   No lymphadenopathy, periungal erythema, or edema.  Lungs: Clear, Cor: RRR w/o m/r/g, Abd: soft, NT, BS+, Ext: no CCE, Neuro-gait normal  Imp SLE --markedly improved mucocutaneous and musculoskeletal symptoms;  Will check labs;including GGT;  will continue hcq (she will schedule an ophthalmology follow-up, and colchicine (would consider increasing dose to 2 daily or 2 tiw--  although increased hepatic transaminases were felt to be unlikely related to colchicine, will check labs before increasing the colchicine).

## 2024-06-20 LAB
ALBUMIN SERPL ELPH-MCNC: 4.3 G/DL
ALP BLD-CCNC: 129 U/L
ALT SERPL-CCNC: 21 U/L
ANION GAP SERPL CALC-SCNC: 18 MMOL/L
AST SERPL-CCNC: 24 U/L
BILIRUB SERPL-MCNC: <0.2 MG/DL
BUN SERPL-MCNC: 16 MG/DL
C3 SERPL-MCNC: 158 MG/DL
C4 SERPL-MCNC: 26 MG/DL
CALCIUM SERPL-MCNC: 9.3 MG/DL
CHLORIDE SERPL-SCNC: 101 MMOL/L
CO2 SERPL-SCNC: 24 MMOL/L
CREAT SERPL-MCNC: 0.7 MG/DL
DSDNA AB SER-ACNC: <1 IU/ML
EGFR: 102 ML/MIN/1.73M2
GGT SERPL-CCNC: 52 U/L
GLUCOSE SERPL-MCNC: 74 MG/DL
POTASSIUM SERPL-SCNC: 4.5 MMOL/L
PROT SERPL-MCNC: 7.1 G/DL
SODIUM SERPL-SCNC: 142 MMOL/L

## 2024-06-21 ENCOUNTER — TRANSCRIPTION ENCOUNTER (OUTPATIENT)
Age: 54
End: 2024-06-21

## 2024-06-24 ENCOUNTER — TRANSCRIPTION ENCOUNTER (OUTPATIENT)
Age: 54
End: 2024-06-24

## 2024-06-24 RX ORDER — HYDROXYCHLOROQUINE SULFATE 200 MG/1
200 TABLET, FILM COATED ORAL
Qty: 180 | Refills: 0 | Status: ACTIVE | COMMUNITY
Start: 1900-01-01 | End: 1900-01-01

## 2024-08-12 ENCOUNTER — APPOINTMENT (OUTPATIENT)
Dept: RHEUMATOLOGY | Facility: CLINIC | Age: 54
End: 2024-08-12

## 2024-08-12 ENCOUNTER — NON-APPOINTMENT (OUTPATIENT)
Age: 54
End: 2024-08-12

## 2024-08-12 DIAGNOSIS — M32.9 SYSTEMIC LUPUS ERYTHEMATOSUS, UNSPECIFIED: ICD-10-CM

## 2024-08-13 ENCOUNTER — LABORATORY RESULT (OUTPATIENT)
Age: 54
End: 2024-08-13

## 2024-08-13 LAB
ALBUMIN SERPL ELPH-MCNC: 4.7 G/DL
ALP BLD-CCNC: 153 U/L
ALT SERPL-CCNC: 32 U/L
ANION GAP SERPL CALC-SCNC: 13 MMOL/L
AST SERPL-CCNC: 31 U/L
BASOPHILS # BLD AUTO: 0.06 K/UL
BASOPHILS NFR BLD AUTO: 1.1 %
BILIRUB SERPL-MCNC: 0.2 MG/DL
BUN SERPL-MCNC: 15 MG/DL
C3 SERPL-MCNC: 159 MG/DL
C4 SERPL-MCNC: 28 MG/DL
CALCIUM SERPL-MCNC: 9.8 MG/DL
CHLORIDE SERPL-SCNC: 99 MMOL/L
CO2 SERPL-SCNC: 28 MMOL/L
CREAT SERPL-MCNC: 0.65 MG/DL
DSDNA AB SER-ACNC: <1 IU/ML
EGFR: 104 ML/MIN/1.73M2
EOSINOPHIL # BLD AUTO: 0.31 K/UL
EOSINOPHIL NFR BLD AUTO: 5.6 %
ERYTHROCYTE [SEDIMENTATION RATE] IN BLOOD BY WESTERGREN METHOD: 23 MM/HR
GLUCOSE SERPL-MCNC: 94 MG/DL
HCT VFR BLD CALC: 40.7 %
HGB BLD-MCNC: 12.5 G/DL
IMM GRANULOCYTES NFR BLD AUTO: 0.2 %
LYMPHOCYTES # BLD AUTO: 1.84 K/UL
LYMPHOCYTES NFR BLD AUTO: 33.5 %
MAN DIFF?: NORMAL
MCHC RBC-ENTMCNC: 27.8 PG
MCHC RBC-ENTMCNC: 30.7 GM/DL
MCV RBC AUTO: 90.6 FL
MONOCYTES # BLD AUTO: 0.5 K/UL
MONOCYTES NFR BLD AUTO: 9.1 %
NEUTROPHILS # BLD AUTO: 2.77 K/UL
NEUTROPHILS NFR BLD AUTO: 50.5 %
PLATELET # BLD AUTO: 276 K/UL
POTASSIUM SERPL-SCNC: 4.3 MMOL/L
PROT SERPL-MCNC: 7.6 G/DL
RBC # BLD: 4.49 M/UL
RBC # FLD: 13.8 %
SODIUM SERPL-SCNC: 140 MMOL/L
WBC # FLD AUTO: 5.49 K/UL

## 2024-08-15 LAB
APPEARANCE: CLEAR
BILIRUBIN URINE: NEGATIVE
BLOOD URINE: NEGATIVE
COLOR: YELLOW
GGT SERPL-CCNC: 55 U/L
GLUCOSE QUALITATIVE U: NEGATIVE MG/DL
KETONES URINE: NEGATIVE MG/DL
LEUKOCYTE ESTERASE URINE: ABNORMAL
NITRITE URINE: NEGATIVE
PH URINE: 7
PROTEIN URINE: NEGATIVE MG/DL
SPECIFIC GRAVITY URINE: 1.01
UROBILINOGEN URINE: 0.2 MG/DL

## 2024-10-08 ENCOUNTER — APPOINTMENT (OUTPATIENT)
Dept: OBGYN | Facility: CLINIC | Age: 54
End: 2024-10-08
Payer: COMMERCIAL

## 2024-10-08 VITALS
SYSTOLIC BLOOD PRESSURE: 138 MMHG | DIASTOLIC BLOOD PRESSURE: 85 MMHG | BODY MASS INDEX: 44.16 KG/M2 | WEIGHT: 240 LBS | HEIGHT: 62 IN

## 2024-10-08 PROCEDURE — 99396 PREV VISIT EST AGE 40-64: CPT

## 2024-10-08 PROCEDURE — 99459 PELVIC EXAMINATION: CPT

## 2024-10-08 PROCEDURE — 82270 OCCULT BLOOD FECES: CPT

## 2024-10-08 RX ORDER — CLOTRIMAZOLE AND BETAMETHASONE DIPROPIONATE 10; .5 MG/G; MG/G
1-0.05 CREAM TOPICAL TWICE DAILY
Qty: 1 | Refills: 0 | Status: ACTIVE | COMMUNITY
Start: 2024-10-08 | End: 1900-01-01

## 2024-10-09 ENCOUNTER — LABORATORY RESULT (OUTPATIENT)
Age: 54
End: 2024-10-09

## 2024-10-09 ENCOUNTER — APPOINTMENT (OUTPATIENT)
Dept: RHEUMATOLOGY | Facility: CLINIC | Age: 54
End: 2024-10-09

## 2024-10-09 VITALS
TEMPERATURE: 97.7 F | HEIGHT: 62 IN | OXYGEN SATURATION: 100 % | WEIGHT: 248 LBS | RESPIRATION RATE: 16 BRPM | HEART RATE: 80 BPM | SYSTOLIC BLOOD PRESSURE: 133 MMHG | DIASTOLIC BLOOD PRESSURE: 81 MMHG | BODY MASS INDEX: 45.64 KG/M2

## 2024-10-09 DIAGNOSIS — M32.9 SYSTEMIC LUPUS ERYTHEMATOSUS, UNSPECIFIED: ICD-10-CM

## 2024-10-09 LAB
APPEARANCE: CLEAR
BASOPHILS # BLD AUTO: 0.04 K/UL
BASOPHILS NFR BLD AUTO: 0.7 %
BILIRUBIN URINE: NEGATIVE
BLOOD URINE: NEGATIVE
COLOR: YELLOW
EOSINOPHIL # BLD AUTO: 0.16 K/UL
EOSINOPHIL NFR BLD AUTO: 2.7 %
ERYTHROCYTE [SEDIMENTATION RATE] IN BLOOD BY WESTERGREN METHOD: 64 MM/HR
GLUCOSE QUALITATIVE U: NEGATIVE MG/DL
HCT VFR BLD CALC: 38.7 %
HGB BLD-MCNC: 12.2 G/DL
HPV HIGH+LOW RISK DNA PNL CVX: NOT DETECTED
IMM GRANULOCYTES NFR BLD AUTO: 0.5 %
KETONES URINE: NEGATIVE MG/DL
LEUKOCYTE ESTERASE URINE: ABNORMAL
LYMPHOCYTES # BLD AUTO: 2.02 K/UL
LYMPHOCYTES NFR BLD AUTO: 34.1 %
MAN DIFF?: NORMAL
MCHC RBC-ENTMCNC: 28.4 PG
MCHC RBC-ENTMCNC: 31.5 GM/DL
MCV RBC AUTO: 90.2 FL
MONOCYTES # BLD AUTO: 0.47 K/UL
MONOCYTES NFR BLD AUTO: 7.9 %
NEUTROPHILS # BLD AUTO: 3.2 K/UL
NEUTROPHILS NFR BLD AUTO: 54.1 %
NITRITE URINE: NEGATIVE
PH URINE: 6.5
PLATELET # BLD AUTO: 263 K/UL
PROTEIN URINE: NEGATIVE MG/DL
RBC # BLD: 4.29 M/UL
RBC # FLD: 14.3 %
SPECIFIC GRAVITY URINE: 1.01
UROBILINOGEN URINE: 0.2 MG/DL
WBC # FLD AUTO: 5.92 K/UL

## 2024-10-10 LAB
ALBUMIN SERPL ELPH-MCNC: 4.3 G/DL
ALP BLD-CCNC: 151 U/L
ALT SERPL-CCNC: 30 U/L
ANION GAP SERPL CALC-SCNC: 17 MMOL/L
AST SERPL-CCNC: 30 U/L
BILIRUB SERPL-MCNC: 0.2 MG/DL
BUN SERPL-MCNC: 16 MG/DL
C3 SERPL-MCNC: 162 MG/DL
C4 SERPL-MCNC: 24 MG/DL
CALCIUM SERPL-MCNC: 9.2 MG/DL
CHLORIDE SERPL-SCNC: 99 MMOL/L
CO2 SERPL-SCNC: 24 MMOL/L
CREAT SERPL-MCNC: 0.69 MG/DL
CREAT SPEC-SCNC: 26 MG/DL
CREAT/PROT UR: 0.2 RATIO
DSDNA AB SER-ACNC: <1 IU/ML
EGFR: 102 ML/MIN/1.73M2
GGT SERPL-CCNC: 68 U/L
GLUCOSE SERPL-MCNC: 68 MG/DL
POTASSIUM SERPL-SCNC: 4.3 MMOL/L
PROT SERPL-MCNC: 7.3 G/DL
PROT UR-MCNC: 6 MG/DL
SODIUM SERPL-SCNC: 141 MMOL/L

## 2024-10-14 LAB — CYTOLOGY CVX/VAG DOC THIN PREP: NORMAL

## 2024-12-16 ENCOUNTER — TRANSCRIPTION ENCOUNTER (OUTPATIENT)
Age: 54
End: 2024-12-16

## 2024-12-16 DIAGNOSIS — Z12.31 ENCOUNTER FOR SCREENING MAMMOGRAM FOR MALIGNANT NEOPLASM OF BREAST: ICD-10-CM

## 2024-12-16 DIAGNOSIS — R92.30 DENSE BREASTS, UNSPECIFIED: ICD-10-CM

## 2025-01-15 ENCOUNTER — LABORATORY RESULT (OUTPATIENT)
Age: 55
End: 2025-01-15

## 2025-01-15 ENCOUNTER — APPOINTMENT (OUTPATIENT)
Dept: RHEUMATOLOGY | Facility: CLINIC | Age: 55
End: 2025-01-15

## 2025-01-15 VITALS
HEART RATE: 82 BPM | DIASTOLIC BLOOD PRESSURE: 85 MMHG | OXYGEN SATURATION: 97 % | HEIGHT: 62 IN | SYSTOLIC BLOOD PRESSURE: 146 MMHG | RESPIRATION RATE: 16 BRPM | TEMPERATURE: 97.7 F | BODY MASS INDEX: 46.38 KG/M2 | WEIGHT: 252 LBS

## 2025-01-15 VITALS — SYSTOLIC BLOOD PRESSURE: 132 MMHG | DIASTOLIC BLOOD PRESSURE: 81 MMHG

## 2025-01-15 DIAGNOSIS — M32.9 SYSTEMIC LUPUS ERYTHEMATOSUS, UNSPECIFIED: ICD-10-CM

## 2025-01-16 LAB
ALBUMIN SERPL ELPH-MCNC: 4.3 G/DL
ALP BLD-CCNC: 154 U/L
ALT SERPL-CCNC: 23 U/L
ANION GAP SERPL CALC-SCNC: 20 MMOL/L
APPEARANCE: CLEAR
AST SERPL-CCNC: 27 U/L
BASOPHILS # BLD AUTO: 0.06 K/UL
BASOPHILS NFR BLD AUTO: 1 %
BILIRUB SERPL-MCNC: <0.2 MG/DL
BILIRUBIN URINE: NEGATIVE
BLOOD URINE: NEGATIVE
BUN SERPL-MCNC: 15 MG/DL
C3 SERPL-MCNC: 168 MG/DL
C4 SERPL-MCNC: 26 MG/DL
CALCIUM SERPL-MCNC: 9.3 MG/DL
CHLORIDE SERPL-SCNC: 98 MMOL/L
CO2 SERPL-SCNC: 22 MMOL/L
COLOR: YELLOW
CREAT SERPL-MCNC: 0.66 MG/DL
DSDNA AB SER-ACNC: <1 IU/ML
EGFR: 103 ML/MIN/1.73M2
EOSINOPHIL # BLD AUTO: 0.29 K/UL
EOSINOPHIL NFR BLD AUTO: 4.6 %
ERYTHROCYTE [SEDIMENTATION RATE] IN BLOOD BY WESTERGREN METHOD: 51 MM/HR
GGT SERPL-CCNC: 56 U/L
GLUCOSE QUALITATIVE U: NEGATIVE MG/DL
GLUCOSE SERPL-MCNC: 58 MG/DL
HCT VFR BLD CALC: 38.8 %
HGB BLD-MCNC: 12.1 G/DL
IMM GRANULOCYTES NFR BLD AUTO: 0.2 %
KETONES URINE: NEGATIVE MG/DL
LEUKOCYTE ESTERASE URINE: ABNORMAL
LYMPHOCYTES # BLD AUTO: 1.95 K/UL
LYMPHOCYTES NFR BLD AUTO: 31 %
MAN DIFF?: NORMAL
MCHC RBC-ENTMCNC: 28.4 PG
MCHC RBC-ENTMCNC: 31.2 G/DL
MCV RBC AUTO: 91.1 FL
MONOCYTES # BLD AUTO: 0.58 K/UL
MONOCYTES NFR BLD AUTO: 9.2 %
NEUTROPHILS # BLD AUTO: 3.41 K/UL
NEUTROPHILS NFR BLD AUTO: 54 %
NITRITE URINE: NEGATIVE
PH URINE: 7.5
PLATELET # BLD AUTO: 275 K/UL
POTASSIUM SERPL-SCNC: 4.5 MMOL/L
PROT SERPL-MCNC: 7.1 G/DL
PROTEIN URINE: NEGATIVE MG/DL
RBC # BLD: 4.26 M/UL
RBC # FLD: 13.6 %
SODIUM SERPL-SCNC: 141 MMOL/L
SPECIFIC GRAVITY URINE: 1.01
UROBILINOGEN URINE: 0.2 MG/DL
WBC # FLD AUTO: 6.3 K/UL

## 2025-03-19 ENCOUNTER — LABORATORY RESULT (OUTPATIENT)
Age: 55
End: 2025-03-19

## 2025-03-20 LAB
ALBUMIN SERPL ELPH-MCNC: 4 G/DL
ALP BLD-CCNC: 258 U/L
ALT SERPL-CCNC: 58 U/L
ANION GAP SERPL CALC-SCNC: 11 MMOL/L
APPEARANCE: CLEAR
AST SERPL-CCNC: 54 U/L
BASOPHILS # BLD AUTO: 0.04 K/UL
BASOPHILS NFR BLD AUTO: 0.9 %
BILIRUB SERPL-MCNC: 0.3 MG/DL
BILIRUBIN URINE: NEGATIVE
BLOOD URINE: NEGATIVE
BUN SERPL-MCNC: 12 MG/DL
C3 SERPL-MCNC: 195 MG/DL
C4 SERPL-MCNC: 32 MG/DL
CALCIUM SERPL-MCNC: 9.2 MG/DL
CHLORIDE SERPL-SCNC: 102 MMOL/L
CO2 SERPL-SCNC: 26 MMOL/L
COLOR: YELLOW
CREAT SERPL-MCNC: 0.7 MG/DL
DSDNA AB SER-ACNC: <1 IU/ML
EGFRCR SERPLBLD CKD-EPI 2021: 101 ML/MIN/1.73M2
EOSINOPHIL # BLD AUTO: 0.24 K/UL
EOSINOPHIL NFR BLD AUTO: 5.2 %
ERYTHROCYTE [SEDIMENTATION RATE] IN BLOOD BY WESTERGREN METHOD: 51 MM/HR
GGT SERPL-CCNC: 97 U/L
GLUCOSE QUALITATIVE U: NEGATIVE MG/DL
GLUCOSE SERPL-MCNC: 82 MG/DL
HCT VFR BLD CALC: 37.9 %
HGB BLD-MCNC: 11.9 G/DL
KETONES URINE: NEGATIVE MG/DL
LEUKOCYTE ESTERASE URINE: ABNORMAL
LYMPHOCYTES # BLD AUTO: 1.64 K/UL
LYMPHOCYTES NFR BLD AUTO: 35.6 %
MAN DIFF?: NORMAL
MCHC RBC-ENTMCNC: 27.5 PG
MCHC RBC-ENTMCNC: 31.4 G/DL
MCV RBC AUTO: 87.7 FL
MONOCYTES # BLD AUTO: 0.64 K/UL
MONOCYTES NFR BLD AUTO: 13.9 %
NEUTROPHILS # BLD AUTO: 2.01 K/UL
NEUTROPHILS NFR BLD AUTO: 43.5 %
NITRITE URINE: NEGATIVE
PH URINE: 6.5
PLATELET # BLD AUTO: 187 K/UL
POTASSIUM SERPL-SCNC: 4.4 MMOL/L
PROT SERPL-MCNC: 6.9 G/DL
PROTEIN URINE: NEGATIVE MG/DL
RBC # BLD: 4.32 M/UL
RBC # FLD: 13.4 %
SODIUM SERPL-SCNC: 140 MMOL/L
SPECIFIC GRAVITY URINE: 1.01
UROBILINOGEN URINE: 0.2 MG/DL
WBC # FLD AUTO: 4.61 K/UL

## 2025-03-22 ENCOUNTER — NON-APPOINTMENT (OUTPATIENT)
Age: 55
End: 2025-03-22

## 2025-03-23 ENCOUNTER — EMERGENCY (EMERGENCY)
Facility: HOSPITAL | Age: 55
LOS: 1 days | Discharge: HOME | End: 2025-03-23
Attending: EMERGENCY MEDICINE | Admitting: INTERNAL MEDICINE
Payer: COMMERCIAL

## 2025-03-23 VITALS
DIASTOLIC BLOOD PRESSURE: 76 MMHG | TEMPERATURE: 101 F | SYSTOLIC BLOOD PRESSURE: 118 MMHG | WEIGHT: 253.31 LBS | HEIGHT: 62 IN | OXYGEN SATURATION: 94 % | HEART RATE: 112 BPM | RESPIRATION RATE: 18 BRPM

## 2025-03-23 LAB
ALBUMIN SERPL ELPH-MCNC: 3.1 G/DL — LOW (ref 3.3–5)
ALP SERPL-CCNC: 340 U/L — HIGH (ref 40–120)
ALT FLD-CCNC: 109 U/L — HIGH (ref 12–78)
ANION GAP SERPL CALC-SCNC: 6 MMOL/L — SIGNIFICANT CHANGE UP (ref 5–17)
APPEARANCE UR: CLEAR — SIGNIFICANT CHANGE UP
APTT BLD: 35.2 SEC — SIGNIFICANT CHANGE UP (ref 24.5–35.6)
AST SERPL-CCNC: 109 U/L — HIGH (ref 15–37)
BASOPHILS # BLD AUTO: 0.06 K/UL — SIGNIFICANT CHANGE UP (ref 0–0.2)
BASOPHILS NFR BLD AUTO: 1 % — SIGNIFICANT CHANGE UP (ref 0–2)
BILIRUB SERPL-MCNC: 0.6 MG/DL — SIGNIFICANT CHANGE UP (ref 0.2–1.2)
BILIRUB UR-MCNC: NEGATIVE — SIGNIFICANT CHANGE UP
BUN SERPL-MCNC: 10 MG/DL — SIGNIFICANT CHANGE UP (ref 7–23)
CALCIUM SERPL-MCNC: 8.6 MG/DL — SIGNIFICANT CHANGE UP (ref 8.5–10.1)
CHLORIDE SERPL-SCNC: 103 MMOL/L — SIGNIFICANT CHANGE UP (ref 96–108)
CO2 SERPL-SCNC: 29 MMOL/L — SIGNIFICANT CHANGE UP (ref 22–31)
COLOR SPEC: SIGNIFICANT CHANGE UP
CREAT SERPL-MCNC: 0.73 MG/DL — SIGNIFICANT CHANGE UP (ref 0.5–1.3)
DIFF PNL FLD: NEGATIVE — SIGNIFICANT CHANGE UP
EGFR: 96 ML/MIN/1.73M2 — SIGNIFICANT CHANGE UP
EGFR: 96 ML/MIN/1.73M2 — SIGNIFICANT CHANGE UP
EOSINOPHIL # BLD AUTO: 0.06 K/UL — SIGNIFICANT CHANGE UP (ref 0–0.5)
EOSINOPHIL NFR BLD AUTO: 1 % — SIGNIFICANT CHANGE UP (ref 0–6)
GLUCOSE SERPL-MCNC: 108 MG/DL — HIGH (ref 70–99)
GLUCOSE UR QL: NEGATIVE MG/DL — SIGNIFICANT CHANGE UP
HCT VFR BLD CALC: 35.9 % — SIGNIFICANT CHANGE UP (ref 34.5–45)
HGB BLD-MCNC: 11.9 G/DL — SIGNIFICANT CHANGE UP (ref 11.5–15.5)
INR BLD: 0.99 RATIO — SIGNIFICANT CHANGE UP (ref 0.85–1.16)
KETONES UR-MCNC: ABNORMAL MG/DL
LACTATE SERPL-SCNC: 1 MMOL/L — SIGNIFICANT CHANGE UP (ref 0.7–2)
LEUKOCYTE ESTERASE UR-ACNC: ABNORMAL
LYMPHOCYTES # BLD AUTO: 2.08 K/UL — SIGNIFICANT CHANGE UP (ref 1–3.3)
LYMPHOCYTES # BLD AUTO: 37 % — SIGNIFICANT CHANGE UP (ref 13–44)
MCHC RBC-ENTMCNC: 27.7 PG — SIGNIFICANT CHANGE UP (ref 27–34)
MCHC RBC-ENTMCNC: 33.1 G/DL — SIGNIFICANT CHANGE UP (ref 32–36)
MCV RBC AUTO: 83.5 FL — SIGNIFICANT CHANGE UP (ref 80–100)
MONOCYTES # BLD AUTO: 0.34 K/UL — SIGNIFICANT CHANGE UP (ref 0–0.9)
MONOCYTES NFR BLD AUTO: 6 % — SIGNIFICANT CHANGE UP (ref 2–14)
NEUTROPHILS # BLD AUTO: 2.75 K/UL — SIGNIFICANT CHANGE UP (ref 1.8–7.4)
NEUTROPHILS NFR BLD AUTO: 47 % — SIGNIFICANT CHANGE UP (ref 43–77)
NITRITE UR-MCNC: NEGATIVE — SIGNIFICANT CHANGE UP
NRBC BLD AUTO-RTO: SIGNIFICANT CHANGE UP /100 WBCS (ref 0–0)
PH UR: 6 — SIGNIFICANT CHANGE UP (ref 5–8)
PLATELET # BLD AUTO: 170 K/UL — SIGNIFICANT CHANGE UP (ref 150–400)
POTASSIUM SERPL-MCNC: 4.1 MMOL/L — SIGNIFICANT CHANGE UP (ref 3.5–5.3)
POTASSIUM SERPL-SCNC: 4.1 MMOL/L — SIGNIFICANT CHANGE UP (ref 3.5–5.3)
PROT SERPL-MCNC: 6.9 G/DL — SIGNIFICANT CHANGE UP (ref 6–8.3)
PROT UR-MCNC: 30 MG/DL
PROTHROM AB SERPL-ACNC: 11.6 SEC — SIGNIFICANT CHANGE UP (ref 9.9–13.4)
RAPID RVP RESULT: SIGNIFICANT CHANGE UP
RBC # BLD: 4.3 M/UL — SIGNIFICANT CHANGE UP (ref 3.8–5.2)
RBC # FLD: 13.8 % — SIGNIFICANT CHANGE UP (ref 10.3–14.5)
SARS-COV-2 RNA SPEC QL NAA+PROBE: SIGNIFICANT CHANGE UP
SODIUM SERPL-SCNC: 138 MMOL/L — SIGNIFICANT CHANGE UP (ref 135–145)
SP GR SPEC: 1.02 — SIGNIFICANT CHANGE UP (ref 1–1.03)
UROBILINOGEN FLD QL: 1 MG/DL — SIGNIFICANT CHANGE UP (ref 0.2–1)
WBC # BLD: 5.62 K/UL — SIGNIFICANT CHANGE UP (ref 3.8–10.5)
WBC # FLD AUTO: 5.62 K/UL — SIGNIFICANT CHANGE UP (ref 3.8–10.5)

## 2025-03-23 PROCEDURE — 99285 EMERGENCY DEPT VISIT HI MDM: CPT

## 2025-03-23 PROCEDURE — 71045 X-RAY EXAM CHEST 1 VIEW: CPT | Mod: 26

## 2025-03-23 PROCEDURE — 76705 ECHO EXAM OF ABDOMEN: CPT | Mod: 26

## 2025-03-23 RX ORDER — AMOXICILLIN AND CLAVULANATE POTASSIUM 500; 125 MG/1; MG/1
875 TABLET, FILM COATED ORAL
Qty: 14 | Refills: 0
Start: 2025-03-23 | End: 2025-03-29

## 2025-03-23 RX ORDER — AMOXICILLIN AND CLAVULANATE POTASSIUM 500; 125 MG/1; MG/1
1 TABLET, FILM COATED ORAL ONCE
Refills: 0 | Status: COMPLETED | OUTPATIENT
Start: 2025-03-23 | End: 2025-03-24

## 2025-03-23 RX ORDER — ACETAMINOPHEN 500 MG/5ML
1000 LIQUID (ML) ORAL ONCE
Refills: 0 | Status: COMPLETED | OUTPATIENT
Start: 2025-03-23 | End: 2025-03-23

## 2025-03-23 RX ORDER — ONDANSETRON HCL/PF 4 MG/2 ML
4 VIAL (ML) INJECTION ONCE
Refills: 0 | Status: COMPLETED | OUTPATIENT
Start: 2025-03-23 | End: 2025-03-23

## 2025-03-23 RX ADMIN — Medication 1550 MILLILITER(S): at 21:32

## 2025-03-23 RX ADMIN — Medication 4 MILLIGRAM(S): at 23:45

## 2025-03-23 RX ADMIN — Medication 400 MILLIGRAM(S): at 21:33

## 2025-03-23 NOTE — ED PROVIDER NOTE - CARE PROVIDER_API CALL
your PCP,   Phone: (   )    -  Fax: (   )    -  Follow Up Time:     Iraj Barker  Infectious Disease  [address] [place] [id number]  Phone: [phone]  Fax: [phone]  Follow Up Time: 1-3 Days   Iraj Barker  Infectious Disease  [address] [place] [id number]  Phone: [phone]  Fax: [phone]  Follow Up Time: 1-3 Days    your PCP,   Phone: (   )    -  Fax: (   )    -  Follow Up Time:     Elisa Bliss  Rheumatology  [address]  [place] [address]  Phone: [phone]  Fax: [phone]  Follow Up Time: 1-3 Days

## 2025-03-23 NOTE — ED PROVIDER NOTE - PATIENT PORTAL LINK FT
You can access the FollowMyHealth Patient Portal offered by [facility] by registering at the following website: [email or web address]. By joining [employer]’s FollowMyHealth portal, you will also be able to view your health information using other applications (apps) compatible with our system. You can access the FollowMyHealth Patient Portal offered by [facility] by registering at the following website: [email or web address]. By joining [employer]’s FollowMyHealth portal, you will also be able to view your health information using other applications (apps) compatible with our system.

## 2025-03-23 NOTE — ED ADULT NURSE NOTE - OBJECTIVE STATEMENT
Patient received complaining of intermittent fevers, worsening at night, x 8 days, states h/x of lupus, denies CP, SOB. States frequently travels for work, last travel about 2 weeks ago to [place]. Patient is AOx4, safety precautions in place, awaiting evaluation

## 2025-03-23 NOTE — ED PROVIDER NOTE - NSFOLLOWUPINSTRUCTIONS_ED_ALL_ED_FT
Followup with your primary care doctor. Return for persistent fever, vomiting, abdominal pain, chest pain, worsening condition. CONSIDER FOLLOW UP WITH INFECTIOUS DISEASE     Fever, Adult    A person taking their temperature by mouth.  A person holding a forehead thermometer to another person's head.  A fever is a high body temperature that is 100.4°F (38°C) or higher. Brief mild or moderate fevers generally have no lasting effects, and they often do not need treatment. Moderate or high fevers can feel uncomfortable and can sometimes be a sign of a serious problem. Fevers can also cause dehydration because the body may sweat, especially if the fever keeps coming back or lasts a long time.    You can use a thermometer to check for a fever. Body temperature can change with:  Age.  Time of day.  Where the temperature is taken, such as in the mouth, rectum, ear, under the arm, or on the forehead.  Follow these instructions at home:  Medicines    Take over-the-counter and prescription medicines only as told by your health care provider. Follow instructions on how much medicine to take and how often.  If you were prescribed antibiotics, take them as told by your provider. Do not stop using the antibiotic even if you start to feel better.  General instructions    Watch for any changes in your symptoms. Let your provider know about them.  Rest as needed.  Drink enough fluid to keep your pee (urine) pale yellow. This helps to prevent dehydration.  Bathe or sponge bathe with room-temperature water to help lower your body temperature as needed. Do not use cold water.  Do not use too many blankets or wear heavy clothes.  Stay home from work and public places for at least 24 hours after your fever is gone. Your fever should be gone without having to use medicines.  Contact a health care provider if:  You have vomiting or diarrhea that does not get better.  You cannot eat or drink without vomiting.  You have pain when you pee (urinate).  Your symptoms do not get better with treatment or you have new symptoms.  You have a skin rash.  You have signs of dehydration, such as:  Dark pee, very little pee, or no pee.  Cracked lips or dry mouth.  Sunken eyes.  Sleepiness.  Weakness.  Get help right away if:  You have shortness of breath or trouble breathing.  You feel dizzy or you faint.  You are confused and do not know the time of day, where you are, or who you are (disoriented).  You have severe pain in your abdomen.  These symptoms may be an emergency. Get help right away. Call 911.  Do not wait to see if the symptoms will go away.  Do not drive yourself to the hospital.  This information is not intended to replace advice given to you by your health care provider. Make sure you discuss any questions you have with your health care provider.    Document Revised: 09/19/2023 Document Reviewed: 09/19/2023  [employer] Patient Education © 2025 [employer]

## 2025-03-23 NOTE — ED PROVIDER NOTE - CARE PROVIDERS DIRECT ADDRESSES
,DirectAddress_Unknown,[email or web address],DirectAddress_Unknown,[email or web address]

## 2025-03-23 NOTE — ED PROVIDER NOTE - CLINICAL SUMMARY MEDICAL DECISION MAKING FREE TEXT BOX
54-year-old female with a history of lupus, on Plaquenil presents with having some fever on and off over the past 8 days.  Patient denies any cough/URI symptoms.  No abdominal pain.  No nausea or vomiting.  No acute rash.  No dysuria/hematuria.  No acute headache.  No neck stiffness or photophobia.  No sore throat.  Patient was seen by primary care this week, and was found to have some slight elevation of LFTs.  The patient has had these symptoms in the past, but her LFTs have been improved.  No acute abdominal pain.  No weakness or dizziness.  No aggravating or alleviating factors otherwise noted.  No other acute complaints.  Exam: Nontoxic, well-appearing.  Normal respiratory effort.  CTA BL, no W/R/R.  Normal cardiac exam.  Abdomen soft, nontender, nondistended.  No HSM.  No CVAT.  Normal nonfocal neurologic exam.  No C/C/E.  No other acute findings on exam.  Acute fever x 8 days without associated symptoms.  Will check labs, x-ray, flu/COVID, UA, ultrasound, IV, reeval

## 2025-03-23 NOTE — ED PROVIDER NOTE - PROVIDER TOKENS
FREE:[LAST:[your PCP],PHONE:[(   )    -],FAX:[(   )    -]],PROVIDER:[TOKEN:[[id number]],FOLLOWUP:[1-3 Days]] PROVIDER:[TOKEN:[[id number]],FOLLOWUP:[1-3 Days]],FREE:[LAST:[your PCP],PHONE:[(   )    -],FAX:[(   )    -]],PROVIDER:[TOKEN:[[id number]],FOLLOWUP:[1-3 Days]]

## 2025-03-23 NOTE — ED PROVIDER NOTE - OBJECTIVE STATEMENT
54-year-old female with past medical history of lupus presents today complaining of a fever for over a week.  Patient does report that she travels commonly in which she recently returned from [place] at a conference.  Patient did not take any Tylenol prior to arrival.  Patient denies rash, cough, vomiting, diarrhea, dysuria, hematuria, or any other complaints.  Patient does report that she ultimately test positive on urinalysis but is not a UTI

## 2025-03-23 NOTE — ED ADULT NURSE NOTE - IN ACCORDANCE WITH [place] LAW, WE OFFER EVERY PATIENT A HEPATITIS C TEST. WOULD YOU LIKE TO BE TESTED TODAY?
Opt out

## 2025-03-24 VITALS — TEMPERATURE: 98 F | RESPIRATION RATE: 17 BRPM | OXYGEN SATURATION: 99 %

## 2025-03-24 LAB
HAV IGM SER-ACNC: SIGNIFICANT CHANGE UP
HBV CORE IGM SER-ACNC: SIGNIFICANT CHANGE UP
HBV SURFACE AG SER-ACNC: SIGNIFICANT CHANGE UP
HCV AB S/CO SERPL IA: 0.09 S/CO — SIGNIFICANT CHANGE UP (ref 0–0.79)
HCV AB SERPL-IMP: SIGNIFICANT CHANGE UP

## 2025-03-24 PROCEDURE — 36415 COLL VENOUS BLD VENIPUNCTURE: CPT

## 2025-03-24 PROCEDURE — 74177 CT ABD & PELVIS W/CONTRAST: CPT | Mod: 26

## 2025-03-24 PROCEDURE — 0225U NFCT DS DNA&RNA 21 SARSCOV2: CPT

## 2025-03-24 PROCEDURE — 96375 TX/PRO/DX INJ NEW DRUG ADDON: CPT

## 2025-03-24 PROCEDURE — 85730 THROMBOPLASTIN TIME PARTIAL: CPT

## 2025-03-24 PROCEDURE — 74177 CT ABD & PELVIS W/CONTRAST: CPT | Mod: MC

## 2025-03-24 PROCEDURE — 76705 ECHO EXAM OF ABDOMEN: CPT

## 2025-03-24 PROCEDURE — 80053 COMPREHEN METABOLIC PANEL: CPT

## 2025-03-24 PROCEDURE — 71260 CT THORAX DX C+: CPT | Mod: 26

## 2025-03-24 PROCEDURE — 80074 ACUTE HEPATITIS PANEL: CPT

## 2025-03-24 PROCEDURE — 85610 PROTHROMBIN TIME: CPT

## 2025-03-24 PROCEDURE — 85025 COMPLETE CBC W/AUTO DIFF WBC: CPT

## 2025-03-24 PROCEDURE — 71260 CT THORAX DX C+: CPT | Mod: MC

## 2025-03-24 PROCEDURE — 87040 BLOOD CULTURE FOR BACTERIA: CPT

## 2025-03-24 PROCEDURE — 96374 THER/PROPH/DIAG INJ IV PUSH: CPT | Mod: XU

## 2025-03-24 PROCEDURE — 99284 EMERGENCY DEPT VISIT MOD MDM: CPT | Mod: 25

## 2025-03-24 PROCEDURE — 87086 URINE CULTURE/COLONY COUNT: CPT

## 2025-03-24 PROCEDURE — 83605 ASSAY OF LACTIC ACID: CPT

## 2025-03-24 PROCEDURE — 71045 X-RAY EXAM CHEST 1 VIEW: CPT

## 2025-03-24 PROCEDURE — 81001 URINALYSIS AUTO W/SCOPE: CPT

## 2025-03-24 RX ORDER — CEFTRIAXONE 500 MG/1
1000 INJECTION, POWDER, FOR SOLUTION INTRAMUSCULAR; INTRAVENOUS ONCE
Refills: 0 | Status: DISCONTINUED | OUTPATIENT
Start: 2025-03-24 | End: 2025-03-24

## 2025-03-24 RX ADMIN — AMOXICILLIN AND CLAVULANATE POTASSIUM 1 TABLET(S): 500; 125 TABLET, FILM COATED ORAL at 01:24

## 2025-03-24 RX ADMIN — Medication 1000 MILLILITER(S): at 01:24

## 2025-03-24 NOTE — ED ADULT NURSE REASSESSMENT NOTE - NS ED NURSE REASSESS COMMENT FT1
Pt was discharged to home today with instruct to follow up with I&D and Rheumatology, return to us if symptoms return>verbalized understanding

## 2025-03-24 NOTE — ED ADULT NURSE REASSESSMENT NOTE - NSFALLUNIVINTERV_ED_ALL_ED
Bed/Stretcher in lowest position, wheels locked, appropriate side rails in place/Call bell, personal items and telephone in reach/Instruct patient to call for assistance before getting out of bed/chair/stretcher/Non-slip footwear applied when patient is off stretcher/[place] to call system/Physically safe environment - no spills, clutter or unnecessary equipment/Purposeful proactive rounding/Room/bathroom lighting operational, light cord in reach
Bed/Stretcher in lowest position, wheels locked, appropriate side rails in place/Call bell, personal items and telephone in reach/Instruct patient to call for assistance before getting out of bed/chair/stretcher/Non-slip footwear applied when patient is off stretcher/[place] to call system/Physically safe environment - no spills, clutter or unnecessary equipment/Purposeful proactive rounding/Room/bathroom lighting operational, light cord in reach

## 2025-03-25 DIAGNOSIS — M32.9 SYSTEMIC LUPUS ERYTHEMATOSUS, UNSPECIFIED: ICD-10-CM

## 2025-03-26 ENCOUNTER — TRANSCRIPTION ENCOUNTER (OUTPATIENT)
Age: 55
End: 2025-03-26

## 2025-03-26 ENCOUNTER — LABORATORY RESULT (OUTPATIENT)
Age: 55
End: 2025-03-26

## 2025-03-26 DIAGNOSIS — R74.8 ABNORMAL LEVELS OF OTHER SERUM ENZYMES: ICD-10-CM

## 2025-03-27 ENCOUNTER — NON-APPOINTMENT (OUTPATIENT)
Age: 55
End: 2025-03-27

## 2025-03-27 ENCOUNTER — TRANSCRIPTION ENCOUNTER (OUTPATIENT)
Age: 55
End: 2025-03-27

## 2025-03-27 LAB
ALBUMIN SERPL ELPH-MCNC: 3.5 G/DL
ALP BLD-CCNC: 329 U/L
ALT SERPL-CCNC: 84 U/L
ANION GAP SERPL CALC-SCNC: 11 MMOL/L
AST SERPL-CCNC: 91 U/L
BASOPHILS # BLD AUTO: 0.05 K/UL
BASOPHILS NFR BLD AUTO: 0.9 %
BILIRUB SERPL-MCNC: 0.3 MG/DL
BUN SERPL-MCNC: 9 MG/DL
CALCIUM SERPL-MCNC: 8.8 MG/DL
CHLORIDE SERPL-SCNC: 104 MMOL/L
CMV IGG SERPL QL: 1.5 U/ML
CMV IGG SERPL-IMP: POSITIVE
CMV IGM SERPL QL: >240 AU/ML
CMV IGM SERPL QL: POSITIVE
CO2 SERPL-SCNC: 26 MMOL/L
CREAT SERPL-MCNC: 0.69 MG/DL
EBV EA AB SER IA-ACNC: 18.2 U/ML
EBV EA AB TITR SER IF: POSITIVE
EBV EA IGG SER QL IA: 94.8 U/ML
EBV EA IGG SER-ACNC: POSITIVE
EBV EA IGM SER IA-ACNC: NEGATIVE
EBV PATRN SPEC IB-IMP: NORMAL
EBV VCA IGG SER IA-ACNC: 83.5 U/ML
EBV VCA IGM SER QL IA: 23.4 U/ML
EGFRCR SERPLBLD CKD-EPI 2021: 102 ML/MIN/1.73M2
EOSINOPHIL # BLD AUTO: 0.32 K/UL
EOSINOPHIL NFR BLD AUTO: 5.4 %
EPSTEIN-BARR VIRUS CAPSID ANTIGEN IGG: POSITIVE
FERRITIN SERPL-MCNC: 336 NG/ML
GGT SERPL-CCNC: 130 U/L
HCT VFR BLD CALC: 33.3 %
HGB BLD-MCNC: 10.7 G/DL
LYMPHOCYTES # BLD AUTO: 2.05 K/UL
LYMPHOCYTES NFR BLD AUTO: 34.8 %
MAN DIFF?: NORMAL
MCHC RBC-ENTMCNC: 27.4 PG
MCHC RBC-ENTMCNC: 32.1 G/DL
MCV RBC AUTO: 85.2 FL
MONOCYTES # BLD AUTO: 0.36 K/UL
MONOCYTES NFR BLD AUTO: 6.2 %
NEUTROPHILS # BLD AUTO: 2.47 K/UL
NEUTROPHILS NFR BLD AUTO: 42 %
PLATELET # BLD AUTO: 186 K/UL
POTASSIUM SERPL-SCNC: 4.2 MMOL/L
PROT SERPL-MCNC: 6.1 G/DL
RBC # BLD: 3.91 M/UL
RBC # FLD: 14.2 %
SODIUM SERPL-SCNC: 140 MMOL/L
WBC # FLD AUTO: 5.88 K/UL

## 2025-03-29 LAB
CULTURE RESULTS: SIGNIFICANT CHANGE UP
CULTURE RESULTS: SIGNIFICANT CHANGE UP
SPECIMEN SOURCE: SIGNIFICANT CHANGE UP
SPECIMEN SOURCE: SIGNIFICANT CHANGE UP

## 2025-04-02 ENCOUNTER — LABORATORY RESULT (OUTPATIENT)
Age: 55
End: 2025-04-02

## 2025-04-02 DIAGNOSIS — K76.0 FATTY (CHANGE OF) LIVER, NOT ELSEWHERE CLASSIFIED: ICD-10-CM

## 2025-04-03 LAB
GGT SERPL-CCNC: 102 U/L
MITOCHONDRIA AB SER IF-ACNC: NORMAL
RIBOSOMAL P AB SER IA-ACNC: <0.2 AL
SMOOTH MUSCLE AB SER QL IF: ABNORMAL

## 2025-04-04 ENCOUNTER — NON-APPOINTMENT (OUTPATIENT)
Age: 55
End: 2025-04-04

## 2025-04-04 DIAGNOSIS — E66.01 MORBID (SEVERE) OBESITY DUE TO EXCESS CALORIES: ICD-10-CM

## 2025-04-04 LAB
ALBUMIN SERPL ELPH-MCNC: 3.8 G/DL
ALP BLD-CCNC: 215 U/L
ALT SERPL-CCNC: 27 U/L
ANION GAP SERPL CALC-SCNC: 9 MMOL/L
AST SERPL-CCNC: 40 U/L
BASOPHILS # BLD AUTO: 0.12 K/UL
BASOPHILS NFR BLD AUTO: 1.7 %
BILIRUB DIRECT SERPL-MCNC: 0.1 MG/DL
BILIRUB SERPL-MCNC: 0.3 MG/DL
BUN SERPL-MCNC: 11 MG/DL
CALCIUM SERPL-MCNC: 9 MG/DL
CHLORIDE SERPL-SCNC: 104 MMOL/L
CO2 SERPL-SCNC: 27 MMOL/L
CREAT SERPL-MCNC: 0.68 MG/DL
EGFRCR SERPLBLD CKD-EPI 2021: 102 ML/MIN/1.73M2
EOSINOPHIL # BLD AUTO: 0.13 K/UL
EOSINOPHIL NFR BLD AUTO: 1.8 %
GLUCOSE SERPL-MCNC: 101 MG/DL
HCT VFR BLD CALC: 34.3 %
HGB BLD-MCNC: 10.7 G/DL
INR PPP: 0.91 RATIO
LYMPHOCYTES # BLD AUTO: 2.73 K/UL
LYMPHOCYTES NFR BLD AUTO: 39.1 %
MAN DIFF?: NORMAL
MCHC RBC-ENTMCNC: 27.7 PG
MCHC RBC-ENTMCNC: 31.2 G/DL
MCV RBC AUTO: 88.9 FL
MONOCYTES # BLD AUTO: 0.18 K/UL
MONOCYTES NFR BLD AUTO: 2.6 %
NEUTROPHILS # BLD AUTO: 2.12 K/UL
NEUTROPHILS NFR BLD AUTO: 27.8 %
PLATELET # BLD AUTO: 240 K/UL
POTASSIUM SERPL-SCNC: 4.6 MMOL/L
PROT SERPL-MCNC: 6.6 G/DL
PT BLD: 10.8 SEC
RBC # BLD: 3.86 M/UL
RBC # FLD: 14.9 %
SODIUM SERPL-SCNC: 140 MMOL/L
WBC # FLD AUTO: 6.99 K/UL

## 2025-04-05 LAB — LKM AB SER QL IF: <20.1 UNITS

## 2025-04-07 LAB — SOLUBLE LIVER IGG SER IA-ACNC: 1.2

## 2025-04-14 ENCOUNTER — APPOINTMENT (OUTPATIENT)
Dept: RHEUMATOLOGY | Facility: CLINIC | Age: 55
End: 2025-04-14

## 2025-04-14 VITALS
DIASTOLIC BLOOD PRESSURE: 79 MMHG | TEMPERATURE: 97.8 F | OXYGEN SATURATION: 96 % | HEART RATE: 86 BPM | WEIGHT: 255 LBS | BODY MASS INDEX: 46.93 KG/M2 | SYSTOLIC BLOOD PRESSURE: 138 MMHG | HEIGHT: 62 IN

## 2025-04-14 DIAGNOSIS — M32.9 SYSTEMIC LUPUS ERYTHEMATOSUS, UNSPECIFIED: ICD-10-CM

## 2025-04-15 LAB
ALBUMIN SERPL ELPH-MCNC: 4 G/DL
ALP BLD-CCNC: 143 U/L
ALT SERPL-CCNC: 20 U/L
ANION GAP SERPL CALC-SCNC: 16 MMOL/L
APPEARANCE: CLEAR
AST SERPL-CCNC: 28 U/L
BASOPHILS # BLD AUTO: 0.06 K/UL
BASOPHILS NFR BLD AUTO: 1.1 %
BILIRUB SERPL-MCNC: 0.2 MG/DL
BILIRUBIN URINE: NEGATIVE
BLOOD URINE: NEGATIVE
BUN SERPL-MCNC: 14 MG/DL
C3 SERPL-MCNC: 153 MG/DL
C4 SERPL-MCNC: 19 MG/DL
CALCIUM SERPL-MCNC: 9.4 MG/DL
CHLORIDE SERPL-SCNC: 101 MMOL/L
CO2 SERPL-SCNC: 23 MMOL/L
COLOR: YELLOW
CREAT SERPL-MCNC: 0.63 MG/DL
CREAT SPEC-SCNC: 52 MG/DL
CREAT/PROT UR: 0.1 RATIO
DSDNA AB SER-ACNC: 1 IU/ML
EGFRCR SERPLBLD CKD-EPI 2021: 104 ML/MIN/1.73M2
EOSINOPHIL # BLD AUTO: 0.1 K/UL
EOSINOPHIL NFR BLD AUTO: 1.8 %
ERYTHROCYTE [SEDIMENTATION RATE] IN BLOOD BY WESTERGREN METHOD: 49 MM/HR
GGT SERPL-CCNC: 70 U/L
GLUCOSE QUALITATIVE U: NEGATIVE MG/DL
GLUCOSE SERPL-MCNC: 47 MG/DL
HCT VFR BLD CALC: 36.9 %
HGB BLD-MCNC: 11 G/DL
IMM GRANULOCYTES NFR BLD AUTO: 0.5 %
KETONES URINE: NEGATIVE MG/DL
LEUKOCYTE ESTERASE URINE: NEGATIVE
LYMPHOCYTES # BLD AUTO: 3.13 K/UL
LYMPHOCYTES NFR BLD AUTO: 57 %
MAN DIFF?: NORMAL
MCHC RBC-ENTMCNC: 27.3 PG
MCHC RBC-ENTMCNC: 29.8 G/DL
MCV RBC AUTO: 91.6 FL
MONOCYTES # BLD AUTO: 0.43 K/UL
MONOCYTES NFR BLD AUTO: 7.8 %
NEUTROPHILS # BLD AUTO: 1.74 K/UL
NEUTROPHILS NFR BLD AUTO: 31.8 %
NITRITE URINE: NEGATIVE
PH URINE: 6
PLATELET # BLD AUTO: 262 K/UL
POTASSIUM SERPL-SCNC: 4.2 MMOL/L
PROT SERPL-MCNC: 7 G/DL
PROT UR-MCNC: 4 MG/DL
PROTEIN URINE: NEGATIVE MG/DL
RBC # BLD: 4.03 M/UL
RBC # FLD: 15.2 %
SODIUM SERPL-SCNC: 140 MMOL/L
SPECIFIC GRAVITY URINE: 1.01
UROBILINOGEN URINE: 0.2 MG/DL
WBC # FLD AUTO: 5.49 K/UL

## 2025-05-05 ENCOUNTER — NON-APPOINTMENT (OUTPATIENT)
Age: 55
End: 2025-05-05

## 2025-05-16 ENCOUNTER — NON-APPOINTMENT (OUTPATIENT)
Age: 55
End: 2025-05-16

## 2025-05-19 ENCOUNTER — TRANSCRIPTION ENCOUNTER (OUTPATIENT)
Age: 55
End: 2025-05-19

## 2025-05-20 ENCOUNTER — TRANSCRIPTION ENCOUNTER (OUTPATIENT)
Age: 55
End: 2025-05-20

## 2025-05-26 ENCOUNTER — NON-APPOINTMENT (OUTPATIENT)
Age: 55
End: 2025-05-26

## 2025-05-27 ENCOUNTER — APPOINTMENT (OUTPATIENT)
Dept: INTERNAL MEDICINE | Facility: CLINIC | Age: 55
End: 2025-05-27
Payer: COMMERCIAL

## 2025-05-27 ENCOUNTER — NON-APPOINTMENT (OUTPATIENT)
Age: 55
End: 2025-05-27

## 2025-05-27 ENCOUNTER — LABORATORY RESULT (OUTPATIENT)
Age: 55
End: 2025-05-27

## 2025-05-27 VITALS
DIASTOLIC BLOOD PRESSURE: 84 MMHG | TEMPERATURE: 97.7 F | RESPIRATION RATE: 16 BRPM | HEART RATE: 73 BPM | BODY MASS INDEX: 46.93 KG/M2 | OXYGEN SATURATION: 98 % | SYSTOLIC BLOOD PRESSURE: 128 MMHG | HEIGHT: 62 IN | WEIGHT: 255 LBS

## 2025-05-27 DIAGNOSIS — E66.01 MORBID (SEVERE) OBESITY DUE TO EXCESS CALORIES: ICD-10-CM

## 2025-05-27 DIAGNOSIS — Z00.00 ENCOUNTER FOR GENERAL ADULT MEDICAL EXAMINATION W/OUT ABNORMAL FINDINGS: ICD-10-CM

## 2025-05-27 DIAGNOSIS — M32.9 SYSTEMIC LUPUS ERYTHEMATOSUS, UNSPECIFIED: ICD-10-CM

## 2025-05-27 LAB
ALBUMIN SERPL ELPH-MCNC: 4 G/DL
ALP BLD-CCNC: 157 U/L
ALT SERPL-CCNC: 32 U/L
ANION GAP SERPL CALC-SCNC: 13 MMOL/L
AST SERPL-CCNC: 33 U/L
BASOPHILS # BLD AUTO: 0.04 K/UL
BASOPHILS NFR BLD AUTO: 0.7 %
BILIRUB SERPL-MCNC: 0.2 MG/DL
BUN SERPL-MCNC: 15 MG/DL
CALCIUM SERPL-MCNC: 9.1 MG/DL
CHLORIDE SERPL-SCNC: 102 MMOL/L
CO2 SERPL-SCNC: 25 MMOL/L
CREAT SERPL-MCNC: 0.64 MG/DL
EGFRCR SERPLBLD CKD-EPI 2021: 104 ML/MIN/1.73M2
EOSINOPHIL # BLD AUTO: 0.2 K/UL
EOSINOPHIL NFR BLD AUTO: 3.6 %
GGT SERPL-CCNC: 55 U/L
GLUCOSE SERPL-MCNC: 101 MG/DL
HCT VFR BLD CALC: 35.6 %
HGB BLD-MCNC: 11 G/DL
IMM GRANULOCYTES NFR BLD AUTO: 0.4 %
LYMPHOCYTES # BLD AUTO: 2.46 K/UL
LYMPHOCYTES NFR BLD AUTO: 44.2 %
MAN DIFF?: NORMAL
MCHC RBC-ENTMCNC: 27.7 PG
MCHC RBC-ENTMCNC: 30.9 G/DL
MCV RBC AUTO: 89.7 FL
MONOCYTES # BLD AUTO: 0.46 K/UL
MONOCYTES NFR BLD AUTO: 8.3 %
NEUTROPHILS # BLD AUTO: 2.38 K/UL
NEUTROPHILS NFR BLD AUTO: 42.8 %
PLATELET # BLD AUTO: 237 K/UL
POTASSIUM SERPL-SCNC: 4.4 MMOL/L
PROT SERPL-MCNC: 6.9 G/DL
RBC # BLD: 3.97 M/UL
RBC # FLD: 13.7 %
SODIUM SERPL-SCNC: 140 MMOL/L
WBC # FLD AUTO: 5.56 K/UL

## 2025-05-27 PROCEDURE — 36415 COLL VENOUS BLD VENIPUNCTURE: CPT

## 2025-05-27 PROCEDURE — 99386 PREV VISIT NEW AGE 40-64: CPT

## 2025-05-27 RX ORDER — TIRZEPATIDE 2.5 MG/.5ML
2.5 INJECTION, SOLUTION SUBCUTANEOUS
Qty: 1 | Refills: 3 | Status: ACTIVE | COMMUNITY
Start: 2025-05-27

## 2025-05-28 ENCOUNTER — APPOINTMENT (OUTPATIENT)
Dept: RHEUMATOLOGY | Facility: CLINIC | Age: 55
End: 2025-05-28

## 2025-05-28 ENCOUNTER — TRANSCRIPTION ENCOUNTER (OUTPATIENT)
Age: 55
End: 2025-05-28

## 2025-05-28 LAB
25(OH)D3 SERPL-MCNC: 54.5 NG/ML
APPEARANCE: CLEAR
BILIRUBIN URINE: NEGATIVE
BLOOD URINE: NEGATIVE
C3 SERPL-MCNC: 148 MG/DL
C4 SERPL-MCNC: 23 MG/DL
CHOLEST SERPL-MCNC: 184 MG/DL
COLOR: YELLOW
CREAT SPEC-SCNC: 130 MG/DL
CREAT/PROT UR: 0.1 RATIO
DSDNA AB SER-ACNC: <1 IU/ML
ERYTHROCYTE [SEDIMENTATION RATE] IN BLOOD BY WESTERGREN METHOD: 41 MM/HR
ESTIMATED AVERAGE GLUCOSE: 105 MG/DL
FOLATE SERPL-MCNC: 10.4 NG/ML
GLUCOSE QUALITATIVE U: NEGATIVE MG/DL
HBA1C MFR BLD HPLC: 5.3 %
HDLC SERPL-MCNC: 66 MG/DL
INSULIN P FAST SERPL-ACNC: 17.6 UU/ML
KETONES URINE: ABNORMAL MG/DL
LDLC SERPL-MCNC: 105 MG/DL
LEUKOCYTE ESTERASE URINE: ABNORMAL
MAGNESIUM SERPL-MCNC: 2.1 MG/DL
NITRITE URINE: NEGATIVE
NONHDLC SERPL-MCNC: 118 MG/DL
PH URINE: 5.5
PROT UR-MCNC: 9 MG/DL
PROTEIN URINE: NORMAL MG/DL
SPECIFIC GRAVITY URINE: 1.03
TRIGL SERPL-MCNC: 71 MG/DL
TSH SERPL-ACNC: 2.07 UIU/ML
UROBILINOGEN URINE: 0.2 MG/DL
VIT B12 SERPL-MCNC: 914 PG/ML

## 2025-06-03 ENCOUNTER — TRANSCRIPTION ENCOUNTER (OUTPATIENT)
Age: 55
End: 2025-06-03

## 2025-06-13 ENCOUNTER — TRANSCRIPTION ENCOUNTER (OUTPATIENT)
Age: 55
End: 2025-06-13

## 2025-06-16 ENCOUNTER — TRANSCRIPTION ENCOUNTER (OUTPATIENT)
Age: 55
End: 2025-06-16

## 2025-07-11 ENCOUNTER — TRANSCRIPTION ENCOUNTER (OUTPATIENT)
Age: 55
End: 2025-07-11

## 2025-07-11 PROBLEM — N39.0 UTI (URINARY TRACT INFECTION): Status: ACTIVE | Noted: 2025-07-11 | Resolved: 2025-08-10

## 2025-07-11 RX ORDER — NITROFURANTOIN (MONOHYDRATE/MACROCRYSTALS) 25; 75 MG/1; MG/1
100 CAPSULE ORAL
Qty: 14 | Refills: 0 | Status: ACTIVE | COMMUNITY
Start: 2025-07-11 | End: 1900-01-01

## 2025-07-14 ENCOUNTER — LABORATORY RESULT (OUTPATIENT)
Age: 55
End: 2025-07-14

## 2025-07-14 ENCOUNTER — APPOINTMENT (OUTPATIENT)
Dept: RHEUMATOLOGY | Facility: CLINIC | Age: 55
End: 2025-07-14

## 2025-07-14 VITALS
RESPIRATION RATE: 16 BRPM | BODY MASS INDEX: 44.35 KG/M2 | SYSTOLIC BLOOD PRESSURE: 119 MMHG | HEART RATE: 71 BPM | WEIGHT: 241 LBS | OXYGEN SATURATION: 97 % | TEMPERATURE: 97.7 F | HEIGHT: 62 IN | DIASTOLIC BLOOD PRESSURE: 71 MMHG

## 2025-07-14 LAB
APPEARANCE: CLEAR
BASOPHILS # BLD AUTO: 0.04 K/UL
BASOPHILS NFR BLD AUTO: 0.9 %
BILIRUBIN URINE: NEGATIVE
BLOOD URINE: NEGATIVE
COLOR: YELLOW
EOSINOPHIL # BLD AUTO: 0.19 K/UL
EOSINOPHIL NFR BLD AUTO: 4.4 %
ERYTHROCYTE [SEDIMENTATION RATE] IN BLOOD BY WESTERGREN METHOD: 37 MM/HR
GLUCOSE QUALITATIVE U: NEGATIVE MG/DL
HCT VFR BLD CALC: 38.1 %
HGB BLD-MCNC: 11.9 G/DL
IMM GRANULOCYTES NFR BLD AUTO: 0.2 %
KETONES URINE: NEGATIVE MG/DL
LEUKOCYTE ESTERASE URINE: ABNORMAL
LYMPHOCYTES # BLD AUTO: 1.9 K/UL
LYMPHOCYTES NFR BLD AUTO: 43.7 %
MAN DIFF?: NORMAL
MCHC RBC-ENTMCNC: 27.5 PG
MCHC RBC-ENTMCNC: 31.2 G/DL
MCV RBC AUTO: 88.2 FL
MONOCYTES # BLD AUTO: 0.39 K/UL
MONOCYTES NFR BLD AUTO: 9 %
NEUTROPHILS # BLD AUTO: 1.82 K/UL
NEUTROPHILS NFR BLD AUTO: 41.8 %
NITRITE URINE: NEGATIVE
PH URINE: 6
PLATELET # BLD AUTO: 191 K/UL
PROTEIN URINE: NEGATIVE MG/DL
RBC # BLD: 4.32 M/UL
RBC # FLD: 14.5 %
SPECIFIC GRAVITY URINE: 1.01
UROBILINOGEN URINE: 0.2 MG/DL
WBC # FLD AUTO: 4.35 K/UL

## 2025-07-15 LAB
ALBUMIN SERPL ELPH-MCNC: 4.4 G/DL
ALP BLD-CCNC: 134 U/L
ALT SERPL-CCNC: 30 U/L
ANION GAP SERPL CALC-SCNC: 17 MMOL/L
AST SERPL-CCNC: 38 U/L
BILIRUB SERPL-MCNC: 0.2 MG/DL
BUN SERPL-MCNC: 18 MG/DL
C3 SERPL-MCNC: 135 MG/DL
C4 SERPL-MCNC: 23 MG/DL
CALCIUM SERPL-MCNC: 9.7 MG/DL
CHLORIDE SERPL-SCNC: 102 MMOL/L
CO2 SERPL-SCNC: 22 MMOL/L
CREAT SERPL-MCNC: 0.64 MG/DL
CREAT SPEC-SCNC: 40 MG/DL
CREAT/PROT UR: 0.2 RATIO
DSDNA AB SER-ACNC: <1 IU/ML
EGFRCR SERPLBLD CKD-EPI 2021: 104 ML/MIN/1.73M2
GGT SERPL-CCNC: 36 U/L
GLUCOSE SERPL-MCNC: 56 MG/DL
POTASSIUM SERPL-SCNC: 4.4 MMOL/L
PROT SERPL-MCNC: 7.1 G/DL
PROT UR-MCNC: 6 MG/DL
SODIUM SERPL-SCNC: 141 MMOL/L

## 2025-07-29 ENCOUNTER — APPOINTMENT (OUTPATIENT)
Dept: INTERNAL MEDICINE | Facility: CLINIC | Age: 55
End: 2025-07-29
Payer: COMMERCIAL

## 2025-07-29 VITALS — HEIGHT: 62 IN | BODY MASS INDEX: 43.66 KG/M2 | WEIGHT: 237.25 LBS

## 2025-07-29 DIAGNOSIS — E66.01 MORBID (SEVERE) OBESITY DUE TO EXCESS CALORIES: ICD-10-CM

## 2025-07-29 PROCEDURE — 99212 OFFICE O/P EST SF 10 MIN: CPT | Mod: 95

## 2025-09-11 ENCOUNTER — TRANSCRIPTION ENCOUNTER (OUTPATIENT)
Age: 55
End: 2025-09-11

## 2025-09-19 ENCOUNTER — APPOINTMENT (OUTPATIENT)
Dept: INTERNAL MEDICINE | Facility: CLINIC | Age: 55
End: 2025-09-19
Payer: COMMERCIAL

## 2025-09-19 DIAGNOSIS — R30.0 DYSURIA: ICD-10-CM

## 2025-09-19 PROCEDURE — 99203 OFFICE O/P NEW LOW 30 MIN: CPT | Mod: 95

## 2025-09-19 PROCEDURE — G2211 COMPLEX E/M VISIT ADD ON: CPT | Mod: NC,95
